# Patient Record
Sex: FEMALE | Race: WHITE | NOT HISPANIC OR LATINO | Employment: OTHER | URBAN - METROPOLITAN AREA
[De-identification: names, ages, dates, MRNs, and addresses within clinical notes are randomized per-mention and may not be internally consistent; named-entity substitution may affect disease eponyms.]

---

## 2017-03-06 RX ORDER — ATORVASTATIN CALCIUM 80 MG/1
TABLET, FILM COATED ORAL
Qty: 90 TABLET | Refills: 0 | Status: SHIPPED | OUTPATIENT
Start: 2017-03-06 | End: 2017-06-03 | Stop reason: SDUPTHER

## 2017-04-10 ENCOUNTER — TRANSCRIBE ORDERS (OUTPATIENT)
Dept: ADMINISTRATIVE | Facility: HOSPITAL | Age: 67
End: 2017-04-10

## 2017-04-10 DIAGNOSIS — Z12.31 VISIT FOR SCREENING MAMMOGRAM: Primary | ICD-10-CM

## 2017-04-17 ENCOUNTER — OFFICE VISIT (OUTPATIENT)
Dept: INTERNAL MEDICINE | Facility: CLINIC | Age: 67
End: 2017-04-17

## 2017-04-17 VITALS
WEIGHT: 174 LBS | DIASTOLIC BLOOD PRESSURE: 68 MMHG | BODY MASS INDEX: 27.25 KG/M2 | RESPIRATION RATE: 16 BRPM | SYSTOLIC BLOOD PRESSURE: 100 MMHG | HEART RATE: 76 BPM

## 2017-04-17 DIAGNOSIS — E03.9 ACQUIRED HYPOTHYROIDISM: ICD-10-CM

## 2017-04-17 DIAGNOSIS — R73.9 ELEVATED BLOOD SUGAR: ICD-10-CM

## 2017-04-17 DIAGNOSIS — E78.5 DYSLIPIDEMIA: ICD-10-CM

## 2017-04-17 DIAGNOSIS — I25.10 CORONARY ARTERY DISEASE INVOLVING NATIVE CORONARY ARTERY OF NATIVE HEART WITHOUT ANGINA PECTORIS: Primary | ICD-10-CM

## 2017-04-17 DIAGNOSIS — I10 ESSENTIAL HYPERTENSION: ICD-10-CM

## 2017-04-17 LAB
EXPIRATION DATE: NORMAL
HBA1C MFR BLD: 5.9 %
Lab: NORMAL

## 2017-04-17 PROCEDURE — 83036 HEMOGLOBIN GLYCOSYLATED A1C: CPT | Performed by: INTERNAL MEDICINE

## 2017-04-17 PROCEDURE — 99214 OFFICE O/P EST MOD 30 MIN: CPT | Performed by: INTERNAL MEDICINE

## 2017-04-17 NOTE — PROGRESS NOTES
Subjective   Patricia F Bancroft is a 66 y.o. female.     History of Present Illness   For follow up of  CAD no chest pain no sob no palpitations.  Hyperlipidemia on meds, no muscle aches.  Elevated blood sugar no new sx.  Depression is stable.      The following portions of the patient's history were reviewed and updated as appropriate: allergies, current medications, past medical history and problem list.    Review of Systems   Constitutional: Negative.    Eyes: Negative.    Respiratory: Negative.  Negative for cough, chest tightness, shortness of breath and wheezing.    Cardiovascular: Negative.  Negative for chest pain, palpitations and leg swelling.   Gastrointestinal: Negative.  Negative for abdominal distention and abdominal pain.       Objective   Physical Exam   Constitutional: She appears well-developed and well-nourished.   Neck: Normal range of motion. Neck supple.   Cardiovascular: Normal rate, regular rhythm, normal heart sounds and intact distal pulses.  Exam reveals no gallop and no friction rub.    No murmur heard.  Pulmonary/Chest: Breath sounds normal. No respiratory distress. She has no wheezes. She has no rales. She exhibits no tenderness.   Abdominal: Soft. Bowel sounds are normal. She exhibits no distension. There is no tenderness.   Nursing note and vitals reviewed.      Assessment/Plan   Dahiana was seen today for hypertension.    Diagnoses and all orders for this visit:    Coronary artery disease involving native coronary artery of native heart without angina pectoris    Essential hypertension    Acquired hypothyroidism    Dyslipidemia    All problems are stable.  Same meds.  Recheck 6 months.

## 2017-05-03 DIAGNOSIS — I10 ESSENTIAL HYPERTENSION: ICD-10-CM

## 2017-05-04 RX ORDER — METOPROLOL SUCCINATE 25 MG/1
TABLET, EXTENDED RELEASE ORAL
Qty: 90 TABLET | Refills: 2 | Status: SHIPPED | OUTPATIENT
Start: 2017-05-04 | End: 2018-01-11 | Stop reason: SDUPTHER

## 2017-06-05 RX ORDER — ATORVASTATIN CALCIUM 80 MG/1
TABLET, FILM COATED ORAL
Qty: 90 TABLET | Refills: 0 | Status: SHIPPED | OUTPATIENT
Start: 2017-06-05 | End: 2017-08-25 | Stop reason: SDUPTHER

## 2017-06-13 ENCOUNTER — RESULTS ENCOUNTER (OUTPATIENT)
Dept: INTERNAL MEDICINE | Facility: CLINIC | Age: 67
End: 2017-06-13

## 2017-06-13 ENCOUNTER — OFFICE VISIT (OUTPATIENT)
Dept: INTERNAL MEDICINE | Facility: CLINIC | Age: 67
End: 2017-06-13

## 2017-06-13 ENCOUNTER — TELEPHONE (OUTPATIENT)
Dept: INTERNAL MEDICINE | Facility: CLINIC | Age: 67
End: 2017-06-13

## 2017-06-13 VITALS
TEMPERATURE: 98 F | RESPIRATION RATE: 16 BRPM | SYSTOLIC BLOOD PRESSURE: 128 MMHG | DIASTOLIC BLOOD PRESSURE: 70 MMHG | BODY MASS INDEX: 27.1 KG/M2 | HEART RATE: 76 BPM | WEIGHT: 173 LBS

## 2017-06-13 DIAGNOSIS — Z13.9 SCREENING: Primary | ICD-10-CM

## 2017-06-13 DIAGNOSIS — Z13.9 SCREENING: ICD-10-CM

## 2017-06-13 PROCEDURE — 99213 OFFICE O/P EST LOW 20 MIN: CPT | Performed by: INTERNAL MEDICINE

## 2017-06-13 NOTE — PROGRESS NOTES
Subjective   Patricia F Bancroft is a 66 y.o. female.     History of Present Illness   Has noticed some pinkish blood she thinks has been coming from vagina.  She has had a complete hysterectomy.  This has gone on for one week.  No pain or other sx.  Bowels are about the same.  Went to GYN and saw the midwife who did not see anything on speculum and urine was normal.  She is to see the GYN md next week.  She is concerned because she is still having the blood on her pad.  None in bowl.    The following portions of the patient's history were reviewed and updated as appropriate: allergies, current medications, past medical history and problem list.    Review of Systems   Constitutional: Negative.    Respiratory: Negative.    Cardiovascular: Negative.    Gastrointestinal: Positive for anal bleeding (not sure.). Negative for abdominal distention and abdominal pain.       Objective   Physical Exam   Constitutional: She appears well-developed.   Abdominal: Soft. Bowel sounds are normal. She exhibits no distension and no mass. There is no tenderness. There is no guarding.   Vitals reviewed.      Assessment/Plan   Dahiana was seen today for rectal bleeding.    Diagnoses and all orders for this visit:    Screening  -     Cologuard; Future    She has not had a colonoscopy in a long time but does not want it.  Will set up cologuard.  She wants to wait and see if goes away to see GI for anoscopy.  She will call if continues.

## 2017-06-13 NOTE — TELEPHONE ENCOUNTER
NOTIFIED PT TO CONTACT INSURANCE COMPANY AND CONFIRM THEY WILL COVER. RECEIVED VERBAL UNDERSTANDING AND APPRECIATION.

## 2017-06-13 NOTE — TELEPHONE ENCOUNTER
----- Message from Anita Jones sent at 6/13/2017 10:55 AM EDT -----  Contact: PATIENT  PATIENT WOULD LIKE TO KNOW IF THE COLOGUARD TEST WILL BE COVERED BY MEDICARE. SHE CAN BE REACHED [phone]

## 2017-06-21 ENCOUNTER — TELEPHONE (OUTPATIENT)
Dept: INTERNAL MEDICINE | Facility: CLINIC | Age: 67
End: 2017-06-21

## 2017-06-21 NOTE — TELEPHONE ENCOUNTER
----- Message from Rachelle Crespo sent at 6/21/2017  9:10 AM EDT -----  Contact: SELF  PATIENT SAYS THAT SHE HAS BEEN SWITHCING BETWEEN DR PIPER AND HER GYNECOLOGIST AND SHE WAS WONDERING IF SHE COULD STOP GOING TO SEE HER GYNECOLOGIST AND JUST STAY WITH DR PIPER. THE GYNECOLOGIST WANTS TO REFER THE PATIENT TO A UROLOGIST AND PATIENT WOULD PREFER IF DR PIPER HANDLES THE REFERRALS AND THE DICTATION OF HER NEXT STEPS. A GOOD CALL BACK NUMBER [phone]. THANK YOU.

## 2017-06-22 NOTE — TELEPHONE ENCOUNTER
PT NOTIFIED. SHE IS GOING TO CALL IN COUPLE WITHIN THE WEEK AND SCHEDULE APPT WITH DR. PIPER TO DISCUSS

## 2017-06-28 ENCOUNTER — TELEPHONE (OUTPATIENT)
Dept: INTERNAL MEDICINE | Facility: CLINIC | Age: 67
End: 2017-06-28

## 2017-06-28 DIAGNOSIS — R31.29 MICROHEMATURIA: Primary | ICD-10-CM

## 2017-06-28 NOTE — TELEPHONE ENCOUNTER
Spoke with pt and she had UA done and blood showed in her urine. Currently is not showing any blood but wants to just have this rechecked since her last dose of abx is tomorrow. Please put in order, pt is wanting to come in on Friday.

## 2017-06-28 NOTE — TELEPHONE ENCOUNTER
----- Message from Jess Anderson sent at 6/28/2017  9:54 AM EDT -----  [phone]    PT IS GOING TO TAKE LAST ANTIBIOTIC TOMORROW.  WANTS TO COME IN Friday TO HAVE URINE RECHECKED.  CAN YOU PUT ORDER IN?  PLEASE CALL TO LET KNOW ORDER IS IN

## 2017-06-28 NOTE — TELEPHONE ENCOUNTER
Spoke with pt and informed her of statement below. She states that her Gynecologist was recommend her have this retested to see if there were more blood showing up after taking abx. Pt is really wanting this as well so she can stop stressing about it.

## 2017-06-28 NOTE — TELEPHONE ENCOUNTER
Ok the order is in the computer.    Again, a urine culture result would be good to have from the other location.

## 2017-06-28 NOTE — TELEPHONE ENCOUNTER
Pt informed and verbalized understanding. Advised pt to contact her OBGYN and have them fax over her urine and culture results.

## 2017-06-28 NOTE — TELEPHONE ENCOUNTER
If her symptoms have improved, t here is no reason to have a recheck unless she hospitalized for it.  See that we didn't do the last UA or abx.  Did she have a culture done?  If the UA showed a small amt of blood, it will have likely cleared up with abx treatment.

## 2017-06-30 ENCOUNTER — LAB (OUTPATIENT)
Dept: INTERNAL MEDICINE | Facility: CLINIC | Age: 67
End: 2017-06-30

## 2017-06-30 DIAGNOSIS — R31.29 MICROHEMATURIA: ICD-10-CM

## 2017-06-30 LAB
BILIRUB BLD-MCNC: NEGATIVE MG/DL
CLARITY, POC: CLEAR
COLOR UR: YELLOW
EXPIRATION DATE: NORMAL
GLUCOSE UR STRIP-MCNC: NEGATIVE MG/DL
KETONES UR QL: NEGATIVE
LEUKOCYTE EST, POC: NEGATIVE
Lab: NORMAL
NITRITE UR-MCNC: NEGATIVE MG/ML
PH UR: 7 [PH] (ref 5–8)
PROT UR STRIP-MCNC: NEGATIVE MG/DL
RBC # UR STRIP: NEGATIVE /UL
SP GR UR: 1.01 (ref 1–1.03)
UROBILINOGEN UR QL: NORMAL

## 2017-06-30 PROCEDURE — 81003 URINALYSIS AUTO W/O SCOPE: CPT | Performed by: PHYSICIAN ASSISTANT

## 2017-07-06 ENCOUNTER — HOSPITAL ENCOUNTER (OUTPATIENT)
Dept: MAMMOGRAPHY | Facility: HOSPITAL | Age: 67
Discharge: HOME | End: 2017-07-06
Attending: OBSTETRICS & GYNECOLOGY | Admitting: OBSTETRICS & GYNECOLOGY

## 2017-07-06 DIAGNOSIS — Z12.31 VISIT FOR SCREENING MAMMOGRAM: ICD-10-CM

## 2017-07-06 PROCEDURE — G0202 SCR MAMMO BI INCL CAD: HCPCS

## 2017-07-06 PROCEDURE — 77063 BREAST TOMOSYNTHESIS BI: CPT

## 2017-07-06 PROCEDURE — G0202 SCR MAMMO BI INCL CAD: HCPCS | Performed by: RADIOLOGY

## 2017-07-06 PROCEDURE — 77063 BREAST TOMOSYNTHESIS BI: CPT | Performed by: RADIOLOGY

## 2017-07-10 ENCOUNTER — OFFICE VISIT (OUTPATIENT)
Dept: CARDIOLOGY | Facility: CLINIC | Age: 67
End: 2017-07-10

## 2017-07-10 VITALS
HEIGHT: 67 IN | HEART RATE: 57 BPM | DIASTOLIC BLOOD PRESSURE: 66 MMHG | WEIGHT: 171 LBS | BODY MASS INDEX: 26.84 KG/M2 | SYSTOLIC BLOOD PRESSURE: 114 MMHG

## 2017-07-10 DIAGNOSIS — I10 ESSENTIAL HYPERTENSION: ICD-10-CM

## 2017-07-10 DIAGNOSIS — E78.2 MIXED HYPERLIPIDEMIA: ICD-10-CM

## 2017-07-10 DIAGNOSIS — I25.10 CORONARY ARTERY DISEASE INVOLVING NATIVE CORONARY ARTERY OF NATIVE HEART WITHOUT ANGINA PECTORIS: Primary | ICD-10-CM

## 2017-07-10 PROCEDURE — 99214 OFFICE O/P EST MOD 30 MIN: CPT | Performed by: PHYSICIAN ASSISTANT

## 2017-07-10 RX ORDER — CLOPIDOGREL BISULFATE 75 MG/1
75 TABLET ORAL DAILY
Qty: 90 TABLET | Refills: 3 | Status: SHIPPED | OUTPATIENT
Start: 2017-07-10 | End: 2018-01-11 | Stop reason: SDUPTHER

## 2017-07-10 NOTE — PROGRESS NOTES
[facility] - Office Note  Patricia F Bancroft         [address] [place] [address]  1950   [phone] (home)      LOCATION:  [place] office.  Visit Type: Follow Up.    PCP:  Nishant Dejesus MD    07/10/17   Patricia F Bancroft is a 66 y.o.  female  retired.      Chief Complaint: Follow up on CAD, HTN, HLP.  PROBLEM LIST:  1. Coronary artery disease:  a. Status post drug-eluting stent to right coronary artery, May 2014, with no significant residual left system disease (EF 65%).  2. Essential Hypertension.  3. Dyslipidemia.  4. Seasonal allergies/allergic rhinitis.  5. History of remote tobacco use.  6. Hyperglycemia.  7. Surgical history:  a. Benign breast biopsy x2.  b. Hysterectomy with ovary removal.          Allergies   Allergen Reactions   • Ibuprofen      Unknown reaction   • Ciprofloxacin Rash         Current Outpatient Prescriptions:   •  aspirin 162 MG EC tablet, Take 162 mg by mouth daily., Disp: , Rfl:   •  atorvastatin (LIPITOR) 80 MG tablet, TAKE ONE TABLET BY MOUTH EVERY NIGHT AT BEDTIME, Disp: 90 tablet, Rfl: 0  •  Cetirizine HCl (ZYRTEC ALLERGY) 10 MG capsule, Take  by mouth., Disp: , Rfl:   •  clopidogrel (PLAVIX) 75 MG tablet, Take 1 tablet by mouth Daily., Disp: 90 tablet, Rfl: 3  •  estradiol (CLIMARA) 0.025 MG/24HR patch, Place 0.025 patches on the skin 1 (one) time per week., Disp: , Rfl:   •  famotidine (PEPCID) 20 MG tablet, Take 20 mg by mouth At Night As Needed for heartburn., Disp: , Rfl:   •  lisinopril (PRINIVIL,ZESTRIL) 10 MG tablet, TAKE ONE TABLET BY MOUTH DAILY, Disp: 90 tablet, Rfl: 2  •  metoprolol succinate XL (TOPROL-XL) 25 MG 24 hr tablet, TAKE ONE TABLET BY MOUTH DAILY, Disp: 90 tablet, Rfl: 2  •  metroNIDAZOLE (METROGEL) 1 % gel, Apply  topically as needed., Disp: , Rfl:     HPI  Dahiana is here today for routine follow-up on CAD with prior stenting, controlled hypertension, and dyslipidemia.  She's also had a history  "of PVCs, treated with beta blockade therapy.  She is done well from a cardiac standpoint since her last visit with us.  She exercises 3-4 times a week without symptoms.  She tolerates her medications and his compliance.  She does need refills today on her Plavix.  She has no cardiac complaints, has had no ED visits or early office visits for heart related issues.    The following portions of the patient's history were reviewed in the chart and updated as appropriate: allergies, current medications, past family history, past medical history, past social history, past surgical history and problem list.    Review of Systems   Constitution: Negative for weakness and malaise/fatigue.   Cardiovascular: Positive for irregular heartbeat. Negative for chest pain, dyspnea on exertion, leg swelling and palpitations.   Hematologic/Lymphatic: Does not bruise/bleed easily.   Musculoskeletal: Negative for myalgias.             height is 67\" (170.2 cm) and weight is 171 lb (77.6 kg). Her blood pressure is 114/66 and her pulse is 57.   Physical Exam   Constitutional: Vital signs are normal. She appears well-developed and well-nourished.   Cardiovascular: Normal rate, regular rhythm, S1 normal, S2 normal, normal heart sounds, intact distal pulses and normal pulses.    Pulmonary/Chest: Effort normal and breath sounds normal. She has no wheezes. She has no rhonchi. She has no rales.   Abdominal: Soft. Normal appearance and bowel sounds are normal. There is no hepatosplenomegaly.   Neurological: She is alert.         Procedures     Assessment/ Plan   Coronary artery disease involving native coronary artery of native heart without angina pectoris:  Stable without symptoms.  Continue all current medications, provide refills on Plavix.  Return to clinic 6 months with EKG or sooner when necessary.    Essential hypertension:  Well-controlled.  Continue current medications.  Get routine blood work to monitor renal function.    Mixed " hyperlipidemia:  Continue Lipitor, exercise, appropriate diet.  Get annual lipid panel and CMP with PCP.      Teresa Kern PA-C  7/10/2017 10:29 AM      EMR Dragon/Transcription disclaimer:   Much of this encounter note is an electronic transcription/translation of spoken language to printed text. The electronic translation of spoken language may permit erroneous, or at times, nonsensical words or phrases to be inadvertently transcribed; Although I have reviewed the note for such errors, some may still exist.

## 2017-07-24 RX ORDER — LISINOPRIL 10 MG/1
TABLET ORAL
Qty: 90 TABLET | Refills: 1 | Status: SHIPPED | OUTPATIENT
Start: 2017-07-24 | End: 2018-01-15 | Stop reason: SDUPTHER

## 2017-08-02 ENCOUNTER — TELEPHONE (OUTPATIENT)
Dept: INTERNAL MEDICINE | Facility: CLINIC | Age: 67
End: 2017-08-02

## 2017-08-02 NOTE — TELEPHONE ENCOUNTER
----- Message from Viktoriya Mercado sent at 8/2/2017 11:04 AM EDT -----  Pt wants to know if results from [facility] have received? Ok to leave a msg.     Patient: [phone]

## 2017-08-07 NOTE — TELEPHONE ENCOUNTER
ZAIDA FROM [facility] STATES HER TEST IS NEGATIVE. SHE WILL FAX RESULTS, YOU WILL GET THE RESULTS SENT TO YOUR INBOX

## 2017-08-25 RX ORDER — ATORVASTATIN CALCIUM 80 MG/1
TABLET, FILM COATED ORAL
Qty: 90 TABLET | Refills: 0 | Status: SHIPPED | OUTPATIENT
Start: 2017-08-25 | End: 2017-11-17 | Stop reason: SDUPTHER

## 2017-10-03 ENCOUNTER — FLU SHOT (OUTPATIENT)
Dept: INTERNAL MEDICINE | Facility: CLINIC | Age: 67
End: 2017-10-03

## 2017-10-03 PROCEDURE — G0008 ADMIN INFLUENZA VIRUS VAC: HCPCS | Performed by: INTERNAL MEDICINE

## 2017-10-03 PROCEDURE — 90662 IIV NO PRSV INCREASED AG IM: CPT | Performed by: INTERNAL MEDICINE

## 2017-10-20 ENCOUNTER — OFFICE VISIT (OUTPATIENT)
Dept: INTERNAL MEDICINE | Facility: CLINIC | Age: 67
End: 2017-10-20

## 2017-10-20 VITALS
SYSTOLIC BLOOD PRESSURE: 126 MMHG | RESPIRATION RATE: 21 BRPM | HEART RATE: 70 BPM | BODY MASS INDEX: 27.1 KG/M2 | WEIGHT: 173 LBS | DIASTOLIC BLOOD PRESSURE: 68 MMHG

## 2017-10-20 DIAGNOSIS — I25.10 CORONARY ARTERY DISEASE INVOLVING NATIVE CORONARY ARTERY OF NATIVE HEART WITHOUT ANGINA PECTORIS: Primary | ICD-10-CM

## 2017-10-20 DIAGNOSIS — E78.2 MIXED HYPERLIPIDEMIA: ICD-10-CM

## 2017-10-20 DIAGNOSIS — E03.9 ACQUIRED HYPOTHYROIDISM: ICD-10-CM

## 2017-10-20 DIAGNOSIS — I10 ESSENTIAL HYPERTENSION: ICD-10-CM

## 2017-10-20 DIAGNOSIS — E78.5 DYSLIPIDEMIA: ICD-10-CM

## 2017-10-20 DIAGNOSIS — R73.9 ELEVATED BLOOD SUGAR: ICD-10-CM

## 2017-10-20 LAB
ALBUMIN SERPL-MCNC: 4.3 G/DL (ref 3.2–4.8)
ALBUMIN/GLOB SERPL: 1.5 G/DL (ref 1.5–2.5)
ALP SERPL-CCNC: 61 U/L (ref 25–100)
ALT SERPL W P-5'-P-CCNC: 31 U/L (ref 7–40)
ANION GAP SERPL CALCULATED.3IONS-SCNC: 5 MMOL/L (ref 3–11)
ARTICHOKE IGE QN: 105 MG/DL (ref 0–130)
AST SERPL-CCNC: 22 U/L (ref 0–33)
BILIRUB SERPL-MCNC: 0.5 MG/DL (ref 0.3–1.2)
BUN BLD-MCNC: 17 MG/DL (ref 9–23)
BUN/CREAT SERPL: 24.3 (ref 7–25)
CALCIUM SPEC-SCNC: 9.3 MG/DL (ref 8.7–10.4)
CHLORIDE SERPL-SCNC: 105 MMOL/L (ref 99–109)
CHOLEST SERPL-MCNC: 174 MG/DL (ref 0–200)
CO2 SERPL-SCNC: 29 MMOL/L (ref 20–31)
CREAT BLD-MCNC: 0.7 MG/DL (ref 0.6–1.3)
DEPRECATED RDW RBC AUTO: 48.5 FL (ref 37–54)
ERYTHROCYTE [DISTWIDTH] IN BLOOD BY AUTOMATED COUNT: 13.6 % (ref 11.3–14.5)
EXPIRATION DATE: NORMAL
GFR SERPL CREATININE-BSD FRML MDRD: 83 ML/MIN/1.73
GLOBULIN UR ELPH-MCNC: 2.8 GM/DL
GLUCOSE BLD-MCNC: 95 MG/DL (ref 70–100)
HBA1C MFR BLD: 5.9 %
HCT VFR BLD AUTO: 42.5 % (ref 34.5–44)
HDLC SERPL-MCNC: 54 MG/DL (ref 40–60)
HGB BLD-MCNC: 13.8 G/DL (ref 11.5–15.5)
Lab: NORMAL
MCH RBC QN AUTO: 31.4 PG (ref 27–31)
MCHC RBC AUTO-ENTMCNC: 32.5 G/DL (ref 32–36)
MCV RBC AUTO: 96.8 FL (ref 80–99)
PLATELET # BLD AUTO: 330 10*3/MM3 (ref 150–450)
PMV BLD AUTO: 10.9 FL (ref 6–12)
POTASSIUM BLD-SCNC: 4.4 MMOL/L (ref 3.5–5.5)
PROT SERPL-MCNC: 7.1 G/DL (ref 5.7–8.2)
RBC # BLD AUTO: 4.39 10*6/MM3 (ref 3.89–5.14)
SODIUM BLD-SCNC: 139 MMOL/L (ref 132–146)
TRIGL SERPL-MCNC: 113 MG/DL (ref 0–150)
WBC NRBC COR # BLD: 6.71 10*3/MM3 (ref 3.5–10.8)

## 2017-10-20 PROCEDURE — 80061 LIPID PANEL: CPT | Performed by: INTERNAL MEDICINE

## 2017-10-20 PROCEDURE — 80053 COMPREHEN METABOLIC PANEL: CPT | Performed by: INTERNAL MEDICINE

## 2017-10-20 PROCEDURE — 36415 COLL VENOUS BLD VENIPUNCTURE: CPT | Performed by: INTERNAL MEDICINE

## 2017-10-20 PROCEDURE — 85027 COMPLETE CBC AUTOMATED: CPT | Performed by: INTERNAL MEDICINE

## 2017-10-20 PROCEDURE — 83036 HEMOGLOBIN GLYCOSYLATED A1C: CPT | Performed by: INTERNAL MEDICINE

## 2017-10-20 PROCEDURE — 99214 OFFICE O/P EST MOD 30 MIN: CPT | Performed by: INTERNAL MEDICINE

## 2017-10-20 NOTE — PROGRESS NOTES
Subjective Patricia F Bancroft is a 67 y.o. female.     History of Present Illness   For follow up of  CAD no chest pain, sob or palpitations.  Hyperlipidemia on meds, no muscle aches.  HTN on meds, no headaches.  Depression is doing well.  Elevated blood sugar no new sx.    The following portions of the patient's history were reviewed and updated as appropriate: allergies, current medications, past family history, past medical history, past social history, past surgical history and problem list.    Review of Systems   Constitutional: Negative.  Negative for appetite change, fatigue and fever.   HENT: Negative.  Negative for congestion, ear pain, hearing loss, rhinorrhea, sinus pressure, sore throat, tinnitus and trouble swallowing.    Eyes: Negative.  Negative for redness and visual disturbance.   Respiratory: Negative.  Negative for cough, chest tightness, shortness of breath and wheezing.    Cardiovascular: Negative.  Negative for chest pain, palpitations and leg swelling.   Gastrointestinal: Negative.  Negative for abdominal distention, abdominal pain, blood in stool, constipation, diarrhea, nausea and vomiting.   Endocrine: Negative.  Negative for polydipsia, polyphagia and polyuria.   Genitourinary: Negative.  Negative for difficulty urinating, dysuria, flank pain, frequency, menstrual problem, pelvic pain, vaginal bleeding and vaginal discharge.   Musculoskeletal: Negative.  Negative for arthralgias, back pain, gait problem, joint swelling and neck pain.   Skin: Negative for rash.   Allergic/Immunologic: Negative.  Negative for environmental allergies.   Neurological: Negative.  Negative for dizziness, tremors, seizures, weakness, numbness and headaches.   Hematological: Negative.  Negative for adenopathy.   Psychiatric/Behavioral: Negative.  Negative for agitation, confusion, dysphoric mood and sleep disturbance. The patient is not nervous/anxious.        Objective   Physical Exam   Constitutional: She is  oriented to person, place, and time. She appears well-developed and well-nourished.   HENT:   Head: Normocephalic and atraumatic.   Right Ear: External ear normal.   Left Ear: External ear normal.   Nose: Nose normal.   Mouth/Throat: Oropharynx is clear and moist.   Eyes: Conjunctivae and EOM are normal. Pupils are equal, round, and reactive to light.   Fundoscopic exam:       The right eye shows no AV nicking and no hemorrhage.        The left eye shows no AV nicking and no hemorrhage.   Neck: Normal range of motion. Neck supple. No thyromegaly present.   Cardiovascular: Normal rate, regular rhythm, normal heart sounds and intact distal pulses.  Exam reveals no gallop and no friction rub.    No murmur heard.  Carotids normal.   Pulmonary/Chest: Effort normal and breath sounds normal. No respiratory distress. She has no wheezes. She has no rales. She exhibits no tenderness. Right breast exhibits no inverted nipple, no mass, no skin change and no tenderness. Left breast exhibits no inverted nipple, no mass, no skin change and no tenderness. Breasts are symmetrical.   Abdominal: Soft. Bowel sounds are normal. She exhibits no distension and no mass. There is no tenderness.   Musculoskeletal: Normal range of motion.   Lymphadenopathy:     She has no cervical adenopathy.   Neurological: She is alert and oriented to person, place, and time. She has normal reflexes. No cranial nerve deficit.   Skin: Skin is warm and dry.   Psychiatric: She has a normal mood and affect. Her behavior is normal. Judgment and thought content normal.   Nursing note and vitals reviewed.      Assessment/Plan   Dahiana was seen today for hyperlipidemia.    Diagnoses and all orders for this visit:    Coronary artery disease involving native coronary artery of native heart without angina pectoris    Essential hypertension    Mixed hyperlipidemia  -     Comprehensive Metabolic Panel  -     Lipid Panel  -     CBC (No Diff)    Acquired  hypothyroidism    Dyslipidemia    Elevated blood sugar  -     POC Glycosylated Hemoglobin (Hb A1C)    All problems are stable.  Labs as noted.  Same meds.  Recheck 6 months.

## 2017-11-17 RX ORDER — ATORVASTATIN CALCIUM 80 MG/1
TABLET, FILM COATED ORAL
Qty: 90 TABLET | Refills: 0 | Status: SHIPPED | OUTPATIENT
Start: 2017-11-17 | End: 2018-01-15 | Stop reason: SDUPTHER

## 2018-01-11 DIAGNOSIS — I10 ESSENTIAL HYPERTENSION: ICD-10-CM

## 2018-01-11 RX ORDER — METOPROLOL SUCCINATE 25 MG/1
25 TABLET, EXTENDED RELEASE ORAL DAILY
Qty: 90 TABLET | Refills: 1 | Status: SHIPPED | OUTPATIENT
Start: 2018-01-11 | End: 2018-01-15 | Stop reason: SDUPTHER

## 2018-01-11 RX ORDER — CLOPIDOGREL BISULFATE 75 MG/1
75 TABLET ORAL DAILY
Qty: 90 TABLET | Refills: 1 | Status: SHIPPED | OUTPATIENT
Start: 2018-01-11 | End: 2018-01-15 | Stop reason: SDUPTHER

## 2018-01-15 ENCOUNTER — TELEPHONE (OUTPATIENT)
Dept: INTERNAL MEDICINE | Facility: CLINIC | Age: 68
End: 2018-01-15

## 2018-01-15 DIAGNOSIS — I10 ESSENTIAL HYPERTENSION: ICD-10-CM

## 2018-01-15 RX ORDER — LISINOPRIL 10 MG/1
10 TABLET ORAL DAILY
Qty: 90 TABLET | Refills: 2 | Status: SHIPPED | OUTPATIENT
Start: 2018-01-15 | End: 2018-08-29 | Stop reason: SDUPTHER

## 2018-01-15 RX ORDER — CLOPIDOGREL BISULFATE 75 MG/1
75 TABLET ORAL DAILY
Qty: 90 TABLET | Refills: 1 | Status: SHIPPED | OUTPATIENT
Start: 2018-01-15 | End: 2018-01-29

## 2018-01-15 RX ORDER — METOPROLOL SUCCINATE 25 MG/1
25 TABLET, EXTENDED RELEASE ORAL DAILY
Qty: 90 TABLET | Refills: 2 | Status: SHIPPED | OUTPATIENT
Start: 2018-01-15 | End: 2019-04-05 | Stop reason: SDUPTHER

## 2018-01-15 RX ORDER — ATORVASTATIN CALCIUM 80 MG/1
80 TABLET, FILM COATED ORAL NIGHTLY
Qty: 90 TABLET | Refills: 2 | Status: SHIPPED | OUTPATIENT
Start: 2018-01-15 | End: 2018-09-11 | Stop reason: SDUPTHER

## 2018-01-15 NOTE — TELEPHONE ENCOUNTER
----- Message from Jess Arevalo sent at 1/15/2018  9:33 AM EST -----  [phone]--DANA ESTEVEZ    THEY HAVE CHANGED PHARMACY TO [facility]. ALL RXS NEED TO BE SENT TO THEM NOW PLEASE.  [facility] PHONE [phone]

## 2018-01-29 ENCOUNTER — OFFICE VISIT (OUTPATIENT)
Dept: CARDIOLOGY | Facility: CLINIC | Age: 68
End: 2018-01-29

## 2018-01-29 VITALS
WEIGHT: 177 LBS | SYSTOLIC BLOOD PRESSURE: 114 MMHG | BODY MASS INDEX: 27.78 KG/M2 | DIASTOLIC BLOOD PRESSURE: 64 MMHG | HEART RATE: 74 BPM | HEIGHT: 67 IN

## 2018-01-29 DIAGNOSIS — I10 ESSENTIAL HYPERTENSION: ICD-10-CM

## 2018-01-29 DIAGNOSIS — E78.2 MIXED HYPERLIPIDEMIA: ICD-10-CM

## 2018-01-29 DIAGNOSIS — I25.10 CORONARY ARTERY DISEASE INVOLVING NATIVE CORONARY ARTERY OF NATIVE HEART WITHOUT ANGINA PECTORIS: Primary | ICD-10-CM

## 2018-01-29 PROCEDURE — 99214 OFFICE O/P EST MOD 30 MIN: CPT | Performed by: INTERNAL MEDICINE

## 2018-01-29 RX ORDER — RANITIDINE 150 MG/1
150 TABLET ORAL DAILY
COMMUNITY
End: 2019-11-22

## 2018-04-20 ENCOUNTER — OFFICE VISIT (OUTPATIENT)
Dept: INTERNAL MEDICINE | Facility: CLINIC | Age: 68
End: 2018-04-20

## 2018-04-20 VITALS
SYSTOLIC BLOOD PRESSURE: 134 MMHG | WEIGHT: 177 LBS | RESPIRATION RATE: 21 BRPM | HEART RATE: 80 BPM | BODY MASS INDEX: 27.72 KG/M2 | DIASTOLIC BLOOD PRESSURE: 72 MMHG

## 2018-04-20 DIAGNOSIS — R73.03 PRE-DIABETES: ICD-10-CM

## 2018-04-20 DIAGNOSIS — E78.2 MIXED HYPERLIPIDEMIA: ICD-10-CM

## 2018-04-20 DIAGNOSIS — E78.5 DYSLIPIDEMIA: ICD-10-CM

## 2018-04-20 DIAGNOSIS — R73.9 ELEVATED BLOOD SUGAR: Primary | ICD-10-CM

## 2018-04-20 DIAGNOSIS — I25.10 CORONARY ARTERY DISEASE INVOLVING NATIVE CORONARY ARTERY OF NATIVE HEART WITHOUT ANGINA PECTORIS: ICD-10-CM

## 2018-04-20 DIAGNOSIS — I10 ESSENTIAL HYPERTENSION: ICD-10-CM

## 2018-04-20 LAB
EXPIRATION DATE: NORMAL
HBA1C MFR BLD: 6 %
Lab: NORMAL

## 2018-04-20 PROCEDURE — 83036 HEMOGLOBIN GLYCOSYLATED A1C: CPT | Performed by: INTERNAL MEDICINE

## 2018-04-20 PROCEDURE — 99214 OFFICE O/P EST MOD 30 MIN: CPT | Performed by: INTERNAL MEDICINE

## 2018-04-20 RX ORDER — CLOPIDOGREL BISULFATE 75 MG/1
TABLET ORAL
COMMUNITY
Start: 2018-04-02 | End: 2018-11-05

## 2018-04-20 NOTE — PROGRESS NOTES
Subjective   Patricia F Bancroft is a 67 y.o. female.     History of Present Illness   For follow up of  CAD no chest pain no sob.  HTN no headaches.  Hyperlipidemia on meds, no muscle aches.  Pre diabetes, no sx.    The following portions of the patient's history were reviewed and updated as appropriate: allergies, current medications, past medical history and problem list.    Review of Systems   Constitutional: Negative.    Eyes: Negative.    Respiratory: Negative.  Negative for cough, chest tightness, shortness of breath and wheezing.    Cardiovascular: Negative.  Negative for chest pain, palpitations and leg swelling.   Gastrointestinal: Negative.  Negative for abdominal distention and abdominal pain.       Objective   Physical Exam   Constitutional: She appears well-developed and well-nourished.   Neck: Normal range of motion. Neck supple.   Cardiovascular: Normal rate, regular rhythm and normal heart sounds.  Exam reveals no gallop and no friction rub.    No murmur heard.  Pulmonary/Chest: Effort normal and breath sounds normal. No respiratory distress. She has no wheezes. She has no rales. She exhibits no tenderness.   Abdominal: Soft. Bowel sounds are normal. She exhibits no distension. There is no tenderness. There is no guarding.   Nursing note and vitals reviewed.        Assessment/Plan   Dahiana was seen today for essential hypertension.    Diagnoses and all orders for this visit:    Elevated blood sugar  -     POC Glycosylated Hemoglobin (Hb A1C)    Coronary artery disease involving native coronary artery of native heart without angina pectoris    Essential hypertension    Mixed hyperlipidemia    Pre-diabetes    Dyslipidemia    A1C = 6.0.  All problems are stable.  Same meds.  Recheck 6 months.

## 2018-05-22 ENCOUNTER — TRANSCRIBE ORDERS (OUTPATIENT)
Dept: ADMINISTRATIVE | Facility: HOSPITAL | Age: 68
End: 2018-05-22

## 2018-05-22 DIAGNOSIS — Z12.31 VISIT FOR SCREENING MAMMOGRAM: Primary | ICD-10-CM

## 2018-06-14 DIAGNOSIS — I10 ESSENTIAL HYPERTENSION: ICD-10-CM

## 2018-06-15 RX ORDER — METOPROLOL SUCCINATE 25 MG/1
TABLET, EXTENDED RELEASE ORAL
Qty: 90 TABLET | Refills: 1 | Status: SHIPPED | OUTPATIENT
Start: 2018-06-15 | End: 2018-08-01

## 2018-07-25 ENCOUNTER — HOSPITAL ENCOUNTER (OUTPATIENT)
Dept: MAMMOGRAPHY | Facility: HOSPITAL | Age: 68
Discharge: HOME | End: 2018-07-25
Attending: INTERNAL MEDICINE | Admitting: INTERNAL MEDICINE

## 2018-07-25 DIAGNOSIS — Z12.31 VISIT FOR SCREENING MAMMOGRAM: ICD-10-CM

## 2018-07-25 PROCEDURE — 77067 SCR MAMMO BI INCL CAD: CPT

## 2018-07-25 PROCEDURE — 77067 SCR MAMMO BI INCL CAD: CPT | Performed by: RADIOLOGY

## 2018-07-25 PROCEDURE — 77063 BREAST TOMOSYNTHESIS BI: CPT | Performed by: RADIOLOGY

## 2018-07-25 PROCEDURE — 77063 BREAST TOMOSYNTHESIS BI: CPT

## 2018-08-01 ENCOUNTER — OFFICE VISIT (OUTPATIENT)
Dept: CARDIOLOGY | Facility: CLINIC | Age: 68
End: 2018-08-01

## 2018-08-01 VITALS
HEART RATE: 80 BPM | DIASTOLIC BLOOD PRESSURE: 50 MMHG | HEIGHT: 68 IN | WEIGHT: 179 LBS | SYSTOLIC BLOOD PRESSURE: 116 MMHG | BODY MASS INDEX: 27.13 KG/M2

## 2018-08-01 DIAGNOSIS — I10 ESSENTIAL HYPERTENSION: ICD-10-CM

## 2018-08-01 DIAGNOSIS — E78.2 MIXED HYPERLIPIDEMIA: ICD-10-CM

## 2018-08-01 DIAGNOSIS — I25.10 CORONARY ARTERY DISEASE INVOLVING NATIVE CORONARY ARTERY OF NATIVE HEART WITHOUT ANGINA PECTORIS: Primary | ICD-10-CM

## 2018-08-01 PROCEDURE — 99214 OFFICE O/P EST MOD 30 MIN: CPT | Performed by: INTERNAL MEDICINE

## 2018-08-01 NOTE — PROGRESS NOTES
[facility]  Office visit  Patricia F Bancroft  1950  [phone]    VISIT DATE:  01/29/2018    PCP: Nishant Dejesus MD  [address] [place], [place] [id number]    CC:  No chief complaint on file.      PROBLEM LIST:  1. Coronary artery disease:  a. Status post drug-eluting stent to right coronary artery, May 2014, with no significant residual left system disease (EF 65%).  2. Essential Hypertension.  3. Dyslipidemia.  4. Seasonal allergies/allergic rhinitis.  5. History of remote tobacco use.  6. Hyperglycemia.  7. Surgical history:  a. Benign breast biopsy x2.  b. Hysterectomy with ovary removal.      ASSESSMENT:   Diagnosis Plan   1. Coronary artery disease involving native coronary artery of native heart without angina pectoris     2. Essential hypertension     3. Mixed hyperlipidemia         PLAN:  Coronary disease: Stable asymptomatic.  Continue aspirin 81 mg by mouth daily and Plavix 75 mg by mouth daily.  She was very hesitant to discontinue dual antiplatelet therapy as we previously discussed.  I reviewed her previous intervention, she is been stable for over 4 years after percutaneous intervention, no high risk anatomical or clinical features which would warrant chronic dual antiplatelet therapy.  Discussed that I felt the risk benefit ratio favored stopping either her aspirin or Plavix.  She will have further discussions with her primary care physician.    Hypertension: Goal less than 130/80.  Currently well-controlled.  Continue current medical therapy.    Hyperlipidemia: Goal LDL <100, recently trended up slightly.  Repeat pending at next primary care physician visit, would switch to rosuvastatin 40 mg Daily if LDL still greater than 100.      Subjective  Here for routine follow-up.  Denies dyspnea on exertion or palpitations.  Compliant with medical therapy.  Blood pressures running less than 130/80 mmHg.  Denies myalgias on statin therapy.  Denies chest  pain.  Discussed pathophysiology of coronary atherosclerosis today to include indications for all of her cardiac medications with the description of her previous percutaneous intervention and the re-endothelialization of the stent scaffold that occurs within the first 6-12 months of stenting.    PHYSICAL EXAMINATION:  There were no vitals filed for this visit.  General Appearance:    Alert, cooperative, no distress, appears stated age   Head:    Normocephalic, without obvious abnormality, atraumatic   Eyes:    conjunctiva/corneas clear   Nose:   Nares normal, septum midline, mucosa normal, no drainage   Throat:   Lips, teeth and gums normal   Neck:   Supple, symmetrical, trachea midline, no carotid    bruit or JVD   Lungs:     Clear to auscultation bilaterally, respirations unlabored   Chest Wall:    No tenderness or deformity    Heart:    Regular rate and rhythm, S1 and S2 normal, no murmur, rub   or gallop, normal carotid impulse bilaterally without bruit.   Abdomen:     Soft, non-tender   Extremities:   Extremities normal, atraumatic, no cyanosis or edema   Pulses:   2+ and symmetric all extremities   Skin:   Skin color, texture, turgor normal, no rashes or lesions       Diagnostic Data:  Procedures  Lab Results   Component Value Date    CHLPL 172 04/27/2016    TRIG 113 10/20/2017    HDL 54 10/20/2017     Lab Results   Component Value Date    GLUCOSE 95 10/20/2017    BUN 17 10/20/2017    CREATININE 0.70 10/20/2017     10/20/2017    K 4.4 10/20/2017     10/20/2017    CO2 29.0 10/20/2017     Lab Results   Component Value Date    HGBA1C 6.0 04/20/2018     Lab Results   Component Value Date    WBC 6.71 10/20/2017    HGB 13.8 10/20/2017    HCT 42.5 10/20/2017     10/20/2017       Allergies  Allergies   Allergen Reactions   • Ciprofloxacin Rash   • Ibuprofen Rash       Current Medications    Current Outpatient Prescriptions:   •  aspirin 162 MG EC tablet, Take 162 mg by mouth daily., Disp: , Rfl:   •   atorvastatin (LIPITOR) 80 MG tablet, Take 1 tablet by mouth Every Night., Disp: 90 tablet, Rfl: 2  •  Cetirizine HCl (ZYRTEC ALLERGY) 10 MG capsule, Take  by mouth Daily., Disp: , Rfl:   •  clopidogrel (PLAVIX) 75 MG tablet, , Disp: , Rfl:   •  estradiol (CLIMARA) 0.025 MG/24HR patch, Place 1 patch on the skin 1 (One) Time Per Week., Disp: 12 patch, Rfl: 2  •  lisinopril (PRINIVIL,ZESTRIL) 10 MG tablet, Take 1 tablet by mouth Daily., Disp: 90 tablet, Rfl: 2  •  metoprolol succinate XL (TOPROL-XL) 25 MG 24 hr tablet, Take 1 tablet by mouth Daily., Disp: 90 tablet, Rfl: 2  •  metoprolol succinate XL (TOPROL-XL) 25 MG 24 hr tablet, TAKE 1 TABLET DAILY, Disp: 90 tablet, Rfl: 1  •  metroNIDAZOLE (METROGEL) 1 % gel, Apply  topically as needed., Disp: , Rfl:   •  raNITIdine (ZANTAC) 150 MG tablet, Take 150 mg by mouth Daily., Disp: , Rfl:           ROS  Review of Systems   Cardiovascular: Negative for chest pain, dyspnea on exertion and irregular heartbeat.   Respiratory: Negative for cough.          SOCIAL HX  Social History     Social History   • Marital status:      Spouse name: N/A   • Number of children: N/A   • Years of education: N/A     Occupational History   • Not on file.     Social History Main Topics   • Smoking status: Former Smoker     Years: 40.00     Types: Cigarettes     Quit date: 2009   • Smokeless tobacco: Never Used   • Alcohol use No   • Drug use: No   • Sexual activity: Defer     Other Topics Concern   • Not on file     Social History Narrative   • No narrative on file       FAMILY HX  Family History   Problem Relation Age of Onset   • Cancer Mother    • Cancer Father    • Breast cancer Neg Hx    • Ovarian cancer Neg Hx              Ghanshyam Padron III, MD, FACC

## 2018-08-30 RX ORDER — LISINOPRIL 10 MG/1
TABLET ORAL
Qty: 90 TABLET | Refills: 2 | Status: SHIPPED | OUTPATIENT
Start: 2018-08-30 | End: 2020-03-05

## 2018-09-11 ENCOUNTER — TELEPHONE (OUTPATIENT)
Dept: INTERNAL MEDICINE | Facility: CLINIC | Age: 68
End: 2018-09-11

## 2018-09-11 RX ORDER — ATORVASTATIN CALCIUM 80 MG/1
80 TABLET, FILM COATED ORAL NIGHTLY
Qty: 90 TABLET | Refills: 2 | Status: SHIPPED | OUTPATIENT
Start: 2018-09-11 | End: 2019-05-20 | Stop reason: ALTCHOICE

## 2018-09-11 NOTE — TELEPHONE ENCOUNTER
----- Message from Dia Mcmanus sent at 9/10/2018  5:16 PM EDT -----  VIJI FROM [facility] CALLING FOR PATRICIA BANCROFT WHO NEEDS A REFILL FOR ATORVASTATIN 80 MG.  [facility] PH:[phone]  FAX:[phone]  REF #: [id number]

## 2018-10-01 ENCOUNTER — FLU SHOT (OUTPATIENT)
Dept: INTERNAL MEDICINE | Facility: CLINIC | Age: 68
End: 2018-10-01

## 2018-10-01 PROCEDURE — G0008 ADMIN INFLUENZA VIRUS VAC: HCPCS | Performed by: INTERNAL MEDICINE

## 2018-10-01 PROCEDURE — 90662 IIV NO PRSV INCREASED AG IM: CPT | Performed by: INTERNAL MEDICINE

## 2018-11-05 ENCOUNTER — OFFICE VISIT (OUTPATIENT)
Dept: INTERNAL MEDICINE | Facility: CLINIC | Age: 68
End: 2018-11-05

## 2018-11-05 VITALS
BODY MASS INDEX: 26.7 KG/M2 | SYSTOLIC BLOOD PRESSURE: 122 MMHG | HEART RATE: 66 BPM | WEIGHT: 173 LBS | RESPIRATION RATE: 19 BRPM | DIASTOLIC BLOOD PRESSURE: 62 MMHG

## 2018-11-05 DIAGNOSIS — K59.04 CHRONIC IDIOPATHIC CONSTIPATION: ICD-10-CM

## 2018-11-05 DIAGNOSIS — R73.9 ELEVATED BLOOD SUGAR: ICD-10-CM

## 2018-11-05 DIAGNOSIS — I10 ESSENTIAL HYPERTENSION: ICD-10-CM

## 2018-11-05 DIAGNOSIS — I25.10 CORONARY ARTERY DISEASE INVOLVING NATIVE CORONARY ARTERY OF NATIVE HEART WITHOUT ANGINA PECTORIS: Primary | ICD-10-CM

## 2018-11-05 DIAGNOSIS — E78.2 MIXED HYPERLIPIDEMIA: ICD-10-CM

## 2018-11-05 DIAGNOSIS — Z79.899 DRUG THERAPY: ICD-10-CM

## 2018-11-05 LAB
ALBUMIN SERPL-MCNC: 4.34 G/DL (ref 3.2–4.8)
ALBUMIN/GLOB SERPL: 1.9 G/DL (ref 1.5–2.5)
ALP SERPL-CCNC: 60 U/L (ref 25–100)
ALT SERPL W P-5'-P-CCNC: 25 U/L (ref 7–40)
ANION GAP SERPL CALCULATED.3IONS-SCNC: 7 MMOL/L (ref 3–11)
ARTICHOKE IGE QN: 95 MG/DL (ref 0–130)
AST SERPL-CCNC: 20 U/L (ref 0–33)
BILIRUB SERPL-MCNC: 0.6 MG/DL (ref 0.3–1.2)
BUN BLD-MCNC: 15 MG/DL (ref 9–23)
BUN/CREAT SERPL: 19.2 (ref 7–25)
CALCIUM SPEC-SCNC: 9.2 MG/DL (ref 8.7–10.4)
CHLORIDE SERPL-SCNC: 103 MMOL/L (ref 99–109)
CHOLEST SERPL-MCNC: 170 MG/DL (ref 0–200)
CO2 SERPL-SCNC: 27 MMOL/L (ref 20–31)
CREAT BLD-MCNC: 0.78 MG/DL (ref 0.6–1.3)
DEPRECATED RDW RBC AUTO: 45.1 FL (ref 37–54)
ERYTHROCYTE [DISTWIDTH] IN BLOOD BY AUTOMATED COUNT: 13 % (ref 11.3–14.5)
GFR SERPL CREATININE-BSD FRML MDRD: 73 ML/MIN/1.73
GLOBULIN UR ELPH-MCNC: 2.3 GM/DL
GLUCOSE BLD-MCNC: 88 MG/DL (ref 70–100)
HBA1C MFR BLD: 6 % (ref 4.8–5.6)
HCT VFR BLD AUTO: 43.4 % (ref 34.5–44)
HDLC SERPL-MCNC: 51 MG/DL (ref 40–60)
HGB BLD-MCNC: 14.2 G/DL (ref 11.5–15.5)
MCH RBC QN AUTO: 31.5 PG (ref 27–31)
MCHC RBC AUTO-ENTMCNC: 32.7 G/DL (ref 32–36)
MCV RBC AUTO: 96.2 FL (ref 80–99)
PLATELET # BLD AUTO: 352 10*3/MM3 (ref 150–450)
PMV BLD AUTO: 11.3 FL (ref 6–12)
POTASSIUM BLD-SCNC: 4.7 MMOL/L (ref 3.5–5.5)
PROT SERPL-MCNC: 6.6 G/DL (ref 5.7–8.2)
RBC # BLD AUTO: 4.51 10*6/MM3 (ref 3.89–5.14)
SODIUM BLD-SCNC: 137 MMOL/L (ref 132–146)
TRIGL SERPL-MCNC: 144 MG/DL (ref 0–150)
WBC NRBC COR # BLD: 6.72 10*3/MM3 (ref 3.5–10.8)

## 2018-11-05 PROCEDURE — 80061 LIPID PANEL: CPT | Performed by: INTERNAL MEDICINE

## 2018-11-05 PROCEDURE — 83036 HEMOGLOBIN GLYCOSYLATED A1C: CPT | Performed by: INTERNAL MEDICINE

## 2018-11-05 PROCEDURE — 36415 COLL VENOUS BLD VENIPUNCTURE: CPT | Performed by: INTERNAL MEDICINE

## 2018-11-05 PROCEDURE — 85027 COMPLETE CBC AUTOMATED: CPT | Performed by: INTERNAL MEDICINE

## 2018-11-05 PROCEDURE — 99214 OFFICE O/P EST MOD 30 MIN: CPT | Performed by: INTERNAL MEDICINE

## 2018-11-05 PROCEDURE — 80053 COMPREHEN METABOLIC PANEL: CPT | Performed by: INTERNAL MEDICINE

## 2018-11-05 NOTE — PROGRESS NOTES
Subjective Patricia F Bancroft is a 68 y.o. female.     History of Present Illness   For follow up of  HTN on meds, no headaches.  CAD no chest pain, no sob or palpitations.  Hyperlipidemia on meds, no muscle aches.  Borderline blood sugar.  Constipation not doing great.      The following portions of the patient's history were reviewed and updated as appropriate: allergies, current medications, past family history, past medical history, past social history, past surgical history and problem list.   Sister diagnosed with pancreatic cancer.    Review of Systems   Constitutional: Negative.  Negative for appetite change, fatigue and fever.   HENT: Negative.  Negative for congestion, ear pain, hearing loss, rhinorrhea, sinus pressure, sore throat, tinnitus and trouble swallowing.    Eyes: Negative.  Negative for redness and visual disturbance.   Respiratory: Negative.  Negative for cough, chest tightness, shortness of breath and wheezing.    Cardiovascular: Negative.  Negative for chest pain, palpitations and leg swelling.   Gastrointestinal: Positive for constipation. Negative for abdominal distention, abdominal pain, blood in stool, diarrhea, nausea and vomiting.   Endocrine: Negative.  Negative for polydipsia, polyphagia and polyuria.   Genitourinary: Negative.  Negative for difficulty urinating, dysuria, flank pain, frequency, menstrual problem, pelvic pain, vaginal bleeding and vaginal discharge.   Musculoskeletal: Negative.  Negative for arthralgias, back pain, gait problem, joint swelling and neck pain.   Skin: Negative for rash.   Allergic/Immunologic: Negative.  Negative for environmental allergies.   Neurological: Negative.  Negative for dizziness, tremors, seizures, weakness, numbness and confusion.   Hematological: Negative.  Negative for adenopathy.   Psychiatric/Behavioral: Negative.  Negative for agitation, dysphoric mood, sleep disturbance and depressed mood. The patient is not nervous/anxious.         Objective   Physical Exam   Constitutional: She is oriented to person, place, and time. She appears well-developed and well-nourished.   HENT:   Head: Normocephalic and atraumatic.   Right Ear: External ear normal.   Left Ear: External ear normal.   Nose: Nose normal.   Mouth/Throat: Oropharynx is clear and moist.   Eyes: Pupils are equal, round, and reactive to light. Conjunctivae and EOM are normal.   Neck: Normal range of motion. Neck supple. No thyromegaly present.   Cardiovascular: Normal rate, regular rhythm, normal heart sounds and intact distal pulses.  Exam reveals no gallop and no friction rub.    No murmur heard.  Carotids normal.   Pulmonary/Chest: Effort normal and breath sounds normal. No respiratory distress. She has no wheezes. She has no rales. She exhibits no tenderness. Right breast exhibits no mass, no skin change and no tenderness. Left breast exhibits no mass, no skin change and no tenderness.   Abdominal: Soft. Bowel sounds are normal. She exhibits no distension and no mass. There is no tenderness.   Musculoskeletal: Normal range of motion. She exhibits no edema.   Lymphadenopathy:     She has no cervical adenopathy.   Neurological: She is alert and oriented to person, place, and time. She has normal reflexes. No cranial nerve deficit.   Skin: Skin is warm and dry.   Psychiatric: She has a normal mood and affect. Her behavior is normal. Judgment and thought content normal.   Nursing note and vitals reviewed.        Assessment/Plan   Dahiana was seen today for essential hypertension.    Diagnoses and all orders for this visit:    Coronary artery disease involving native coronary artery of native heart without angina pectoris    Essential hypertension    Mixed hyperlipidemia  -     Comprehensive Metabolic Panel  -     Lipid Panel    Elevated blood sugar  -     Hemoglobin A1c    Drug therapy  -     CBC (No Diff)    Chronic idiopathic constipation    All problems are stable.  Will try to  increase fiber or myrilax.  Labs as ordered.  Same meds.  Recheck 6 months.

## 2018-11-10 DIAGNOSIS — I10 ESSENTIAL HYPERTENSION: ICD-10-CM

## 2018-11-13 RX ORDER — METOPROLOL SUCCINATE 25 MG/1
TABLET, EXTENDED RELEASE ORAL
Qty: 90 TABLET | Refills: 1 | Status: SHIPPED | OUTPATIENT
Start: 2018-11-13 | End: 2019-02-28 | Stop reason: SDUPTHER

## 2019-01-21 ENCOUNTER — OFFICE VISIT (OUTPATIENT)
Dept: INTERNAL MEDICINE | Facility: CLINIC | Age: 69
End: 2019-01-21

## 2019-01-21 VITALS
RESPIRATION RATE: 22 BRPM | TEMPERATURE: 98.2 F | HEART RATE: 74 BPM | BODY MASS INDEX: 26.85 KG/M2 | DIASTOLIC BLOOD PRESSURE: 72 MMHG | WEIGHT: 174 LBS | SYSTOLIC BLOOD PRESSURE: 136 MMHG

## 2019-01-21 DIAGNOSIS — J40 BRONCHITIS: Primary | ICD-10-CM

## 2019-01-21 PROCEDURE — 99214 OFFICE O/P EST MOD 30 MIN: CPT | Performed by: INTERNAL MEDICINE

## 2019-01-21 RX ORDER — PROMETHAZINE HYDROCHLORIDE AND CODEINE PHOSPHATE 6.25; 1 MG/5ML; MG/5ML
SYRUP ORAL
Qty: 120 ML | Refills: 1 | Status: SHIPPED | OUTPATIENT
Start: 2019-01-21 | End: 2019-05-20

## 2019-01-21 RX ORDER — AZITHROMYCIN 250 MG/1
TABLET, FILM COATED ORAL
Qty: 6 TABLET | Refills: 0 | Status: SHIPPED | OUTPATIENT
Start: 2019-01-21 | End: 2019-02-28

## 2019-01-21 NOTE — PROGRESS NOTES
Subjective   Patricia F Bancroft is a 68 y.o. female.     History of Present Illness   Has been sick for about 5 days.  Started with a sore throat, now is congested and coughing with some productive sputum.  No fever or headache.    The following portions of the patient's history were reviewed and updated as appropriate: allergies, current medications, past medical history and problem list.    Review of Systems   Constitutional: Negative for fatigue and fever.   HENT: Positive for congestion and sore throat.    Eyes: Negative.    Respiratory: Positive for cough. Negative for chest tightness, shortness of breath and wheezing.    Cardiovascular: Negative.  Negative for chest pain, palpitations and leg swelling.   Gastrointestinal: Negative.  Negative for abdominal distention, abdominal pain and diarrhea.       Objective   Physical Exam   Constitutional: She appears well-developed and well-nourished.   HENT:   Mouth/Throat: Oropharynx is clear and moist.   Neck: Normal range of motion. Neck supple.   Cardiovascular: Normal rate, regular rhythm and normal heart sounds. Exam reveals no gallop and no friction rub.   No murmur heard.  Pulmonary/Chest: Effort normal. No stridor. No respiratory distress. She has no wheezes. She has no rales.   Few rhonchi bilaterally.   Abdominal: Soft.   Lymphadenopathy:     She has no cervical adenopathy.   Nursing note and vitals reviewed.        Assessment/Plan   Dahiana was seen today for cough.    Diagnoses and all orders for this visit:    Bronchitis  -     azithromycin (ZITHROMAX Z-ANG) 250 MG tablet; Take 2 tablets the first day, then 1 tablet daily for 4 days.  -     promethazine-codeine (PHENERGAN with CODEINE) 6.25-10 MG/5ML syrup; One teaspoon q 6 hours prn cough    Believe she has acute bronchitis.  Will treat as above.  She will call if not better.

## 2019-02-15 ENCOUNTER — TELEPHONE (OUTPATIENT)
Dept: INTERNAL MEDICINE | Facility: CLINIC | Age: 69
End: 2019-02-15

## 2019-02-15 NOTE — TELEPHONE ENCOUNTER
Called pt in regards to scheduling Subsequent Medicare AWV.  Pt scheduled with Simeon CUBA 2/28/19 11:00am. Thank you!

## 2019-02-28 ENCOUNTER — OFFICE VISIT (OUTPATIENT)
Dept: INTERNAL MEDICINE | Facility: CLINIC | Age: 69
End: 2019-02-28

## 2019-02-28 VITALS
HEIGHT: 68 IN | RESPIRATION RATE: 18 BRPM | DIASTOLIC BLOOD PRESSURE: 68 MMHG | SYSTOLIC BLOOD PRESSURE: 134 MMHG | WEIGHT: 174.6 LBS | HEART RATE: 71 BPM | TEMPERATURE: 97.8 F | BODY MASS INDEX: 26.46 KG/M2

## 2019-02-28 DIAGNOSIS — Z78.0 POST-MENOPAUSAL: ICD-10-CM

## 2019-02-28 DIAGNOSIS — Z87.891 PERSONAL HISTORY OF TOBACCO USE, PRESENTING HAZARDS TO HEALTH: ICD-10-CM

## 2019-02-28 DIAGNOSIS — Z00.00 MEDICARE ANNUAL WELLNESS VISIT, SUBSEQUENT: Primary | ICD-10-CM

## 2019-02-28 PROCEDURE — G0439 PPPS, SUBSEQ VISIT: HCPCS | Performed by: NURSE PRACTITIONER

## 2019-02-28 RX ORDER — LORATADINE 10 MG/1
CAPSULE, LIQUID FILLED ORAL
COMMUNITY
End: 2019-08-14

## 2019-02-28 NOTE — PROGRESS NOTES
QUICK REFERENCE INFORMATION:  The ABCs of the Annual Wellness Visit    Subsequent Medicare Wellness Visit    HEALTH RISK ASSESSMENT    1950    Recent Hospitalizations:  No hospitalization(s) within the last year..        Current Medical Providers:  Patient Care Team:  Nishant Dejesus MD as PCP - General  Nishant Dejesus MD as PCP - Family Medicine  Nishant Dejesus MD as PCP - Claims Attributed        Smoking Status:  Social History     Tobacco Use   Smoking Status Former Smoker   • Years: 40.00   • Types: Cigarettes   • Last attempt to quit: 2009   • Years since quitting: 10.1   Smokeless Tobacco Never Used       Alcohol Consumption:  Social History     Substance and Sexual Activity   Alcohol Use No       Depression Screen:   PHQ-2/PHQ-9 Depression Screening 2/28/2019   Little interest or pleasure in doing things 0   Feeling down, depressed, or hopeless 0   Trouble falling or staying asleep, or sleeping too much -   Feeling tired or having little energy -   Poor appetite or overeating -   Feeling bad about yourself - or that you are a failure or have let yourself or your family down -   Trouble concentrating on things, such as reading the newspaper or watching television -   Moving or speaking so slowly that other people could have noticed. Or the opposite - being so fidgety or restless that you have been moving around a lot more than usual -   Thoughts that you would be better off dead, or of hurting yourself in some way -   Total Score 0   If you checked off any problems, how difficult have these problems made it for you to do your work, take care of things at home, or get along with other people? -       Health Habits and Functional and Cognitive Screening:  Functional & Cognitive Status 2/28/2019   Do you have difficulty preparing food and eating? No   Do you have difficulty bathing yourself, getting dressed or grooming yourself? No   Do you have difficulty using the toilet? No   Do you have  difficulty moving around from place to place? No   Do you have trouble with steps or getting out of a bed or a chair? No   In the past year have you fallen or experienced a near fall? No   Current Diet Well Balanced Diet   Dental Exam Up to date   Eye Exam Up to date   Exercise (times per week) 3 times per week   Current Exercise Activities Include Aerobics   Do you need help using the phone?  No   Are you deaf or do you have serious difficulty hearing?  No   Do you need help with transportation? No   Do you need help shopping? No   Do you need help preparing meals?  No   Do you need help with housework?  No   Do you need help with laundry? No   Do you need help taking your medications? No   Do you need help managing money? No   Do you ever drive or ride in a car without wearing a seat belt? No   Have you felt unusual stress, anger or loneliness in the last month? No   Who do you live with? Spouse   If you need help, do you have trouble finding someone available to you? No   Have you been bothered in the last four weeks by sexual problems? No   Do you have difficulty concentrating, remembering or making decisions? No           Does the patient have evidence of cognitive impairment? No    Aspirin use counseling: Taking ASA appropriately as indicated      Recent Lab Results:  CMP:  Lab Results   Component Value Date    BUN 15 11/05/2018    CREATININE 0.78 11/05/2018    EGFRIFNONA 73 11/05/2018    BCR 19.2 11/05/2018     11/05/2018    K 4.7 11/05/2018    CO2 27.0 11/05/2018    CALCIUM 9.2 11/05/2018    ALBUMIN 4.34 11/05/2018    BILITOT 0.6 11/05/2018    ALKPHOS 60 11/05/2018    AST 20 11/05/2018    ALT 25 11/05/2018     Lipid Panel:  Lab Results   Component Value Date    CHOL 170 11/05/2018    TRIG 144 11/05/2018    HDL 51 11/05/2018     HbA1c:  Lab Results   Component Value Date    HGBA1C 6.00 (H) 11/05/2018       Visual Acuity:  No exam data present    Age-appropriate Screening Schedule:  Refer to the list  below for future screening recommendations based on patient's age, sex and/or medical conditions. Orders for these recommended tests are listed in the plan section. The patient has been provided with a written plan.    Health Maintenance   Topic Date Due   • ZOSTER VACCINE (2 of 2) 02/28/2019 (Originally 2/26/2014)   • LIPID PANEL  11/05/2019   • MAMMOGRAM  07/25/2020   • PAP SMEAR  06/17/2021   • TDAP/TD VACCINES (2 - Td) 08/01/2022   • COLONOSCOPY  04/17/2027   • INFLUENZA VACCINE  Completed   • PNEUMOCOCCAL VACCINES (65+ LOW/MEDIUM RISK)  Completed        Subjective   History of Present Illness    Patricia F Bancroft is a 68 y.o. female who presents for an Subsequent Wellness Visit.    The following portions of the patient's history were reviewed and updated as appropriate: allergies, current medications, past family history, past medical history, past social history, past surgical history and problem list.    Outpatient Medications Prior to Visit   Medication Sig Dispense Refill   • aspirin 162 MG EC tablet Take 162 mg by mouth daily.     • atorvastatin (LIPITOR) 80 MG tablet Take 1 tablet by mouth Every Night. 90 tablet 2   • estradiol (CLIMARA) 0.025 MG/24HR patch PLACE 1 PATCH ON THE SKIN  ONE TIME PER WEEK 12 patch 2   • lisinopril (PRINIVIL,ZESTRIL) 10 MG tablet TAKE 1 TABLET DAILY 90 tablet 2   • Loratadine (CLARITIN) 10 MG capsule Take  by mouth.     • metoprolol succinate XL (TOPROL-XL) 25 MG 24 hr tablet Take 1 tablet by mouth Daily. 90 tablet 2   • promethazine-codeine (PHENERGAN with CODEINE) 6.25-10 MG/5ML syrup One teaspoon q 6 hours prn cough 120 mL 1   • raNITIdine (ZANTAC) 150 MG tablet Take 150 mg by mouth Daily.     • azithromycin (ZITHROMAX Z-ANG) 250 MG tablet Take 2 tablets the first day, then 1 tablet daily for 4 days. 6 tablet 0   • metoprolol succinate XL (TOPROL-XL) 25 MG 24 hr tablet TAKE 1 TABLET DAILY 90 tablet 1   • metroNIDAZOLE (METROGEL) 1 % gel Apply  topically as needed.    "    No facility-administered medications prior to visit.        Patient Active Problem List   Diagnosis   • Coronary artery disease involving native coronary artery of native heart without angina pectoris   • Essential hypertension   • Mixed hyperlipidemia   • Elevated blood sugar   • Allergic rhinitis   • Chronic idiopathic constipation       Advance Care Planning:  has an advance directive - a copy HAS NOT been provided Patient states she does not want office to have a copy, because she fears form may become outdated or changed and will not be current.    Identification of Risk Factors:  Risk factors include: weight , increased fall risk and polypharmacy.    Review of Systems    Compared to one year ago, the patient feels her physical health is the same.  Compared to one year ago, the patient feels her mental health is the same.    Objective     Physical Exam    Vitals:    02/28/19 1101   BP: 134/68   Pulse: 71   Resp: 18   Temp: 97.8 °F (36.6 °C)   Weight: 79.2 kg (174 lb 9.6 oz)   Height: 171.5 cm (67.5\")       Patient's Body mass index is 26.94 kg/m². BMI is above normal parameters. Recommendations include: educational material and exercise counseling.      Assessment/Plan   Patient Self-Management and Personalized Health Advice  The patient has been provided with information about: diet, exercise, weight management, prevention of cardiac or vascular disease, the relationship between weight and GERD and fall prevention and preventive services including:   · Bone densitometry screening, Exercise counseling provided, Fall Risk assessment done.    Visit Diagnoses:    ICD-10-CM ICD-9-CM   1. Medicare annual wellness visit, subsequent Z00.00 V70.0   2. Post-menopausal Z78.0 V49.81   3. Personal history of tobacco use, presenting hazards to health Z87.891 V15.82       Orders Placed This Encounter   Procedures   • DEXA Bone Density Axial     Standing Status:   Future     Standing Expiration Date:   2/29/2020     Order " Specific Question:   Reason for Exam:     Answer:   post menopausal, osteopenia   • CT Chest Low Dose Wo     Standing Status:   Future     Standing Expiration Date:   2/28/2020     Order Specific Question:   The patient is age 55-77:     Answer:   68     Order Specific Question:   The patient is a current smoker?     Answer:   No     Order Specific Question:   The patient is a former smoker who has quit within the last 15 years?     Answer:   Yes     Order Specific Question:   The number of years since quitting smoking.     Answer:   10     Order Specific Question:   The patient has a smoking history of 30 pack-years or greater:     Answer:   Yes     Order Specific Question:   Actual pack - year smoking history (number):     Answer:   40     Order Specific Question:   Has the Patient had a Chest CT scan within the past 12 months?     Answer:   No     Order Specific Question:   Does the patient have any clinical signs/symptoms of lung cancer?     Answer:   No     Order Specific Question:   The patient was engaged in shared decision-making for this test:     Answer:   Yes       Outpatient Encounter Medications as of 2/28/2019   Medication Sig Dispense Refill   • aspirin 162 MG EC tablet Take 162 mg by mouth daily.     • atorvastatin (LIPITOR) 80 MG tablet Take 1 tablet by mouth Every Night. 90 tablet 2   • estradiol (CLIMARA) 0.025 MG/24HR patch PLACE 1 PATCH ON THE SKIN  ONE TIME PER WEEK 12 patch 2   • lisinopril (PRINIVIL,ZESTRIL) 10 MG tablet TAKE 1 TABLET DAILY 90 tablet 2   • Loratadine (CLARITIN) 10 MG capsule Take  by mouth.     • metoprolol succinate XL (TOPROL-XL) 25 MG 24 hr tablet Take 1 tablet by mouth Daily. 90 tablet 2   • promethazine-codeine (PHENERGAN with CODEINE) 6.25-10 MG/5ML syrup One teaspoon q 6 hours prn cough 120 mL 1   • raNITIdine (ZANTAC) 150 MG tablet Take 150 mg by mouth Daily.     • [DISCONTINUED] azithromycin (ZITHROMAX Z-ANG) 250 MG tablet Take 2 tablets the first day, then 1 tablet  daily for 4 days. 6 tablet 0   • [DISCONTINUED] metoprolol succinate XL (TOPROL-XL) 25 MG 24 hr tablet TAKE 1 TABLET DAILY 90 tablet 1   • [DISCONTINUED] metroNIDAZOLE (METROGEL) 1 % gel Apply  topically as needed.       No facility-administered encounter medications on file as of 2/28/2019.        Reviewed use of high risk medication in the elderly: yes  Reviewed for potential of harmful drug interactions in the elderly: yes    Follow Up:  Return if symptoms worsen or fail to improve, for F/U for Subsequent Wellness in one year & a day.     An After Visit Summary and PPPS with all of these plans were given to the patient.

## 2019-03-07 ENCOUNTER — HOSPITAL ENCOUNTER (OUTPATIENT)
Dept: BONE DENSITY | Facility: HOSPITAL | Age: 69
Discharge: HOME | End: 2019-03-07
Admitting: NURSE PRACTITIONER

## 2019-03-07 DIAGNOSIS — Z78.0 POST-MENOPAUSAL: ICD-10-CM

## 2019-03-07 PROCEDURE — 77080 DXA BONE DENSITY AXIAL: CPT

## 2019-03-14 ENCOUNTER — HOSPITAL ENCOUNTER (OUTPATIENT)
Dept: CT IMAGING | Facility: HOSPITAL | Age: 69
Discharge: HOME | End: 2019-03-14
Admitting: NURSE PRACTITIONER

## 2019-03-14 DIAGNOSIS — Z87.891 PERSONAL HISTORY OF TOBACCO USE, PRESENTING HAZARDS TO HEALTH: ICD-10-CM

## 2019-03-14 PROCEDURE — G0297 LDCT FOR LUNG CA SCREEN: HCPCS

## 2019-03-15 ENCOUNTER — TELEPHONE (OUTPATIENT)
Dept: INTERNAL MEDICINE | Facility: CLINIC | Age: 69
End: 2019-03-15

## 2019-03-15 NOTE — TELEPHONE ENCOUNTER
----- Message from ROSA ELENA Eason sent at 3/15/2019  8:44 AM EDT -----  Please inform patient-lung CT benign. Recommendation is to continue annual  low-dose screening.

## 2019-03-15 NOTE — TELEPHONE ENCOUNTER
Telephone call to thoroughly discuss benign changes on CT and need to monitor annually. Patient verbalized understanding and appreciation for call.

## 2019-04-05 DIAGNOSIS — I10 ESSENTIAL HYPERTENSION: ICD-10-CM

## 2019-04-05 RX ORDER — METOPROLOL SUCCINATE 25 MG/1
TABLET, EXTENDED RELEASE ORAL
Qty: 90 TABLET | Refills: 3 | Status: SHIPPED | OUTPATIENT
Start: 2019-04-05 | End: 2020-05-19 | Stop reason: SDUPTHER

## 2019-04-05 RX ORDER — LISINOPRIL 10 MG/1
TABLET ORAL
Qty: 90 TABLET | Refills: 2 | Status: SHIPPED | OUTPATIENT
Start: 2019-04-05 | End: 2019-05-20

## 2019-05-20 ENCOUNTER — OFFICE VISIT (OUTPATIENT)
Dept: INTERNAL MEDICINE | Facility: CLINIC | Age: 69
End: 2019-05-20

## 2019-05-20 VITALS
SYSTOLIC BLOOD PRESSURE: 128 MMHG | BODY MASS INDEX: 27.16 KG/M2 | RESPIRATION RATE: 17 BRPM | DIASTOLIC BLOOD PRESSURE: 68 MMHG | HEART RATE: 70 BPM | WEIGHT: 176 LBS

## 2019-05-20 DIAGNOSIS — I25.10 CORONARY ARTERY DISEASE INVOLVING NATIVE CORONARY ARTERY OF NATIVE HEART WITHOUT ANGINA PECTORIS: Primary | ICD-10-CM

## 2019-05-20 DIAGNOSIS — R73.9 ELEVATED BLOOD SUGAR: ICD-10-CM

## 2019-05-20 DIAGNOSIS — E78.2 MIXED HYPERLIPIDEMIA: ICD-10-CM

## 2019-05-20 DIAGNOSIS — I10 ESSENTIAL HYPERTENSION: ICD-10-CM

## 2019-05-20 LAB
EXPIRATION DATE: NORMAL
HBA1C MFR BLD: 6.1 %
Lab: NORMAL

## 2019-05-20 PROCEDURE — 83036 HEMOGLOBIN GLYCOSYLATED A1C: CPT | Performed by: INTERNAL MEDICINE

## 2019-05-20 PROCEDURE — 99214 OFFICE O/P EST MOD 30 MIN: CPT | Performed by: INTERNAL MEDICINE

## 2019-05-20 RX ORDER — ROSUVASTATIN CALCIUM 20 MG/1
20 TABLET, COATED ORAL DAILY
Qty: 90 TABLET | Refills: 3 | Status: SHIPPED | OUTPATIENT
Start: 2019-05-20 | End: 2019-12-02 | Stop reason: SDUPTHER

## 2019-05-20 NOTE — PROGRESS NOTES
Subjective   Patricia F Bancroft is a 68 y.o. female.     History of Present Illness   For follow up of  CAD, no chest pain, sob or palpitations.  HTN on meds, no headaches.  Hyperlipidemia on meds, no muscle aches.  Elevated blood sugar, no sx.    The following portions of the patient's history were reviewed and updated as appropriate: allergies, current medications, past medical history, past social history and problem list.    Review of Systems   Constitutional: Negative.  Negative for fatigue and fever.   Eyes: Negative.    Respiratory: Negative.  Negative for cough, chest tightness, shortness of breath and wheezing.    Cardiovascular: Negative.  Negative for chest pain, palpitations and leg swelling.   Gastrointestinal: Negative.  Negative for abdominal distention and abdominal pain.   Genitourinary: Negative.    Neurological: Negative.        Objective   Physical Exam   Constitutional: She appears well-developed and well-nourished.   Neck: Normal range of motion. Neck supple.   Cardiovascular: Normal rate, regular rhythm and normal heart sounds. Exam reveals no gallop and no friction rub.   No murmur heard.  Pulmonary/Chest: Effort normal and breath sounds normal. No respiratory distress. She has no wheezes. She has no rales. She exhibits no tenderness.   Abdominal: Soft. Bowel sounds are normal. She exhibits no distension and no mass. There is no tenderness. There is no guarding.   Musculoskeletal: She exhibits no edema.   Nursing note and vitals reviewed.        Assessment/Plan   Dahiana was seen today for essential hypertension.    Diagnoses and all orders for this visit:    Coronary artery disease involving native coronary artery of native heart without angina pectoris   Stable, no change.    Essential hypertension  Stable no change.    Mixed hyperlipidemia  Will change to crestor.    Elevated blood sugar  A1C = 6.1.    Other orders  -     estradiol (CLIMARA) 0.025 MG/24HR patch; Place 1 patch on the skin  as directed by provider 1 (One) Time Per Week.  -     rosuvastatin (CRESTOR) 20 MG tablet; Take 1 tablet by mouth Daily.    All problems are stable.  As above.  Recheck 6 months.

## 2019-05-23 ENCOUNTER — TELEPHONE (OUTPATIENT)
Dept: INTERNAL MEDICINE | Facility: CLINIC | Age: 69
End: 2019-05-23

## 2019-05-23 NOTE — TELEPHONE ENCOUNTER
----- Message from Melody Hess sent at 5/22/2019  4:06 PM EDT -----  Contact: Dahiana  Patient stated that [employer] messed up her prescription. She is requesting to have the estradiol be resent. She also wanted to make sure that it was the patches not the cream.    estradiol (CLIMARA) 0.025 MG/24HR patch Place 1 patch on the skin as directed by provider 1 (One) Time Per Week.     [facility] - [place], [place] - [address] AT Portal to Registered [facility] -  [phone] (Phone)[phone] (Fax)

## 2019-07-01 ENCOUNTER — TRANSCRIBE ORDERS (OUTPATIENT)
Dept: INTERNAL MEDICINE | Facility: CLINIC | Age: 69
End: 2019-07-01

## 2019-07-01 DIAGNOSIS — Z12.31 VISIT FOR SCREENING MAMMOGRAM: Primary | ICD-10-CM

## 2019-07-18 ENCOUNTER — OFFICE VISIT (OUTPATIENT)
Dept: INTERNAL MEDICINE | Facility: CLINIC | Age: 69
End: 2019-07-18

## 2019-07-18 VITALS
TEMPERATURE: 98.5 F | OXYGEN SATURATION: 95 % | BODY MASS INDEX: 26.97 KG/M2 | WEIGHT: 171.8 LBS | RESPIRATION RATE: 16 BRPM | HEIGHT: 67 IN | DIASTOLIC BLOOD PRESSURE: 70 MMHG | HEART RATE: 83 BPM | SYSTOLIC BLOOD PRESSURE: 110 MMHG

## 2019-07-18 DIAGNOSIS — H01.004 BLEPHARITIS OF LEFT UPPER EYELID, UNSPECIFIED TYPE: Primary | ICD-10-CM

## 2019-07-18 PROCEDURE — 99213 OFFICE O/P EST LOW 20 MIN: CPT | Performed by: PHYSICIAN ASSISTANT

## 2019-07-18 NOTE — PROGRESS NOTES
Chief Complaint   Patient presents with   • Conjunctivitis     x3 weeks, F/U left eye       Subjective       History of Present Illness     Patricia F Bancroft is a 68 y.o. female. She presents with 1 day history of redness of L eyelid, with recent conjunctivitis of both eyes. Pt had recent dx conjunctivitis of L eye, but developed similar but more mild symptoms of R eye as well. Dx 2019 at [facility] and prescribed Polytrim eye drops + PO Bactrim for possible skin infection of L upper eyelid. Pt states that this regimen did clear her symptoms, but yesterday noted that very small red bump appeared on L eyelid, which was similar to onset of previous episode. She states that this cynthia is a little itchy, but no pain. She denies any true conjunctivitis symptoms at this time, denies itching or irritation of eye, redness of eye, or discharge or crusting. She only has small red bump at L eyelid. Pt also states that she noticed a small red spot at top of R eyelid this morning, but not itchy. She denies any eye pain or pain with eye movement. She wears glasses but no contacts. She has not used any eye makeup since conjunctivitis dx 2+ weeks ago, and has thrown away her mascara. Denies fever, chills, HA, or any other concerns today.     The following portions of the patient's history were reviewed and updated as appropriate: allergies, current medications and problem list.    Allergies   Allergen Reactions   • Dust Mite Extract Cough, Itching and Other (See Comments)   • Ciprofloxacin Rash   • Ibuprofen Rash     Social History     Tobacco Use   • Smoking status: Former Smoker     Years: 40.00     Types: Cigarettes     Start date: 1966     Last attempt to quit: 2009     Years since quittin.9   • Smokeless tobacco: Never Used   Substance Use Topics   • Alcohol use: No         Current Outpatient Medications:   •  aspirin 162 MG EC tablet, Take 162 mg by mouth daily., Disp: , Rfl:   •  estradiol (CLIMARA) 0.025 MG/24HR  patch, Place 1 patch on the skin as directed by provider 1 (One) Time Per Week., Disp: 12 patch, Rfl: 3  •  lisinopril (PRINIVIL,ZESTRIL) 10 MG tablet, TAKE 1 TABLET DAILY, Disp: 90 tablet, Rfl: 2  •  Loratadine (CLARITIN) 10 MG capsule, Take  by mouth., Disp: , Rfl:   •  metoprolol succinate XL (TOPROL-XL) 25 MG 24 hr tablet, TAKE 1 TABLET DAILY, Disp: 90 tablet, Rfl: 3  •  raNITIdine (ZANTAC) 150 MG tablet, Take 150 mg by mouth Daily., Disp: , Rfl:   •  rosuvastatin (CRESTOR) 20 MG tablet, Take 1 tablet by mouth Daily., Disp: 90 tablet, Rfl: 3    Review of Systems   Constitutional: Negative for chills and fever.   HENT: Negative for congestion and sore throat.    Eyes: Negative for blurred vision, pain, discharge, redness, itching and visual disturbance.        +red upper L eyelid   Respiratory: Negative for shortness of breath.    Cardiovascular: Negative for chest pain.   Gastrointestinal: Negative for abdominal pain, nausea and vomiting.   Allergic/Immunologic: Negative for immunocompromised state.   Neurological: Negative for dizziness and headache.       Objective   Vitals:    07/18/19 1413   BP: 110/70   Pulse: 83   Resp: 16   Temp: 98.5 °F (36.9 °C)   SpO2: 95%     Physical Exam   Constitutional: She appears well-developed and well-nourished.   HENT:   Head: Normocephalic and atraumatic.   Mouth/Throat: Oropharynx is clear and moist.   Eyes: Conjunctivae and EOM are normal. Pupils are equal, round, and reactive to light. Right eye exhibits no chemosis and no discharge. Left eye exhibits no chemosis and no discharge. No scleral icterus.   +pinpoint area of pink, raised skin at L upper eyelid near lash line. No signs of conjunctivitis or other eye issue. No discharge, swelling, or TTP.    Cardiovascular: Normal rate and regular rhythm.   Pulmonary/Chest: Effort normal and breath sounds normal. She has no rales.         Assessment/Plan   Dahiana was seen today for conjunctivitis.    Diagnoses and all orders  for this visit:    Blepharitis of left upper eyelid, unspecified type      Possible early blepharitis or stye, but appears to be simple irritation and nearly negligible area of pink raised skin where pt complains of itching.  Advised using Elvis's baby shampoo on moist Qtip to clean this area BID, followed by warm compress.              Return if symptoms worsen or fail to improve.

## 2019-08-14 ENCOUNTER — OFFICE VISIT (OUTPATIENT)
Dept: CARDIOLOGY | Facility: CLINIC | Age: 69
End: 2019-08-14

## 2019-08-14 VITALS
WEIGHT: 174.8 LBS | DIASTOLIC BLOOD PRESSURE: 70 MMHG | HEART RATE: 78 BPM | HEIGHT: 67 IN | OXYGEN SATURATION: 96 % | BODY MASS INDEX: 27.44 KG/M2 | SYSTOLIC BLOOD PRESSURE: 118 MMHG

## 2019-08-14 DIAGNOSIS — I25.10 CORONARY ARTERY DISEASE INVOLVING NATIVE CORONARY ARTERY OF NATIVE HEART WITHOUT ANGINA PECTORIS: Primary | ICD-10-CM

## 2019-08-14 DIAGNOSIS — E78.2 MIXED HYPERLIPIDEMIA: ICD-10-CM

## 2019-08-14 DIAGNOSIS — I10 ESSENTIAL HYPERTENSION: ICD-10-CM

## 2019-08-14 PROCEDURE — 99214 OFFICE O/P EST MOD 30 MIN: CPT | Performed by: INTERNAL MEDICINE

## 2019-08-14 PROCEDURE — 93000 ELECTROCARDIOGRAM COMPLETE: CPT | Performed by: INTERNAL MEDICINE

## 2019-08-14 RX ORDER — ATORVASTATIN CALCIUM 80 MG/1
80 TABLET, FILM COATED ORAL DAILY
COMMUNITY
End: 2019-11-22

## 2019-08-14 RX ORDER — CETIRIZINE HYDROCHLORIDE 10 MG/1
10 TABLET ORAL DAILY
COMMUNITY
End: 2020-05-26

## 2019-08-14 NOTE — PROGRESS NOTES
[facility]  Office visit  Patricia F Bancroft  1950  [phone]    VISIT DATE:  8/14/2019      PCP: Nishant Dejesus MD  [address] [place], [place] [id number]    CC:  Chief Complaint   Patient presents with   • Coronary Artery Disease       PROBLEM LIST:  1. Coronary artery disease:  a. Status post drug-eluting stent to right coronary artery, May 2014, with no significant residual left system disease (EF 65%).  2. Essential Hypertension.  3. Dyslipidemia.  4. Seasonal allergies/allergic rhinitis.  5. History of remote tobacco use.  6. Hyperglycemia.  7. Surgical history:  a. Benign breast biopsy x2.  b. Hysterectomy with ovary removal.      ASSESSMENT:   Diagnosis Plan   1. Coronary artery disease involving native coronary artery of native heart without angina pectoris     2. Essential hypertension     3. Mixed hyperlipidemia         PLAN:  Coronary disease: Stable asymptomatic.  Continue aspirin 162 mg by mouth daily, high intensity statin therapy and afterload reduction with combination of lisinopril and metoprolol.    Hypertension: Goal less than 130/80.  Currently well-controlled.  Continue current medical therapy.    Hyperlipidemia: Goal LDL <70, this 2 more months of atorvastatin 80 mg p.o. daily and then will switch over to rosuvastatin 20 mg p.o. daily.  Fasting lipid panel pending prior to primary care physician follow-up in November.    Cardiac murmur: Consistent with aortic sclerosis.  Does report recurrent episodes of strep pharyngitis as a child but was never diagnosed with rheumatic fever.  We will continue to trend murmur clinically and evaluate with transthoracic echo if murmur increases in severity.    Subjective  Here for routine follow-up.  Denies dyspnea on exertion or palpitations.  Compliant with medical therapy.  Blood pressures running less than 130/80 mmHg.  Denies myalgias on statin therapy.  Denies chest pain.  Maintaining an active  "lifestyle without limitation.    PHYSICAL EXAMINATION:  Vitals:    08/14/19 1518   BP: 118/70   BP Location: Left arm   Patient Position: Sitting   Pulse: 78   SpO2: 96%   Weight: 79.3 kg (174 lb 12.8 oz)   Height: 170.2 cm (67\")     General Appearance:    Alert, cooperative, no distress, appears stated age   Head:    Normocephalic, without obvious abnormality, atraumatic   Eyes:    conjunctiva/corneas clear   Nose:   Nares normal, septum midline, mucosa normal, no drainage   Throat:   Lips, teeth and gums normal   Neck:   Supple, symmetrical, trachea midline, no carotid    bruit or JVD   Lungs:     Clear to auscultation bilaterally, respirations unlabored   Chest Wall:    No tenderness or deformity    Heart:    Regular rate and rhythm, S1 and S2 normal, 2/6 early peaking systolic murmur right upper sternal border, rub   or gallop, normal carotid impulse bilaterally without bruit.   Abdomen:     Soft, non-tender   Extremities:   Extremities normal, atraumatic, no cyanosis or edema   Pulses:   2+ and symmetric all extremities   Skin:   Skin color, texture, turgor normal, no rashes or lesions       Diagnostic Data:    ECG 12 Lead  Date/Time: 8/14/2019 3:54 PM  Performed by: Ghanshyam Padron III, MD  Authorized by: Ghanshyam Padron III, MD   Comparison: compared with previous ECG from 4/27/2016  Comparison to previous ECG: PVCs no longer present  Rhythm: sinus rhythm  Other findings: non-specific ST-T wave changes    Clinical impression: non-specific ECG          Lab Results   Component Value Date    CHLPL 172 04/27/2016    TRIG 144 11/05/2018    HDL 51 11/05/2018     Lab Results   Component Value Date    GLUCOSE 88 11/05/2018    BUN 15 11/05/2018    CREATININE 0.78 11/05/2018     11/05/2018    K 4.7 11/05/2018     11/05/2018    CO2 27.0 11/05/2018     Lab Results   Component Value Date    HGBA1C 6.1 05/20/2019     Lab Results   Component Value Date    WBC 6.72 11/05/2018    HGB 14.2 11/05/2018    HCT 43.4 " 11/05/2018     11/05/2018       Allergies  Allergies   Allergen Reactions   • Dust Mite Extract Cough, Itching and Other (See Comments)   • Ciprofloxacin Rash   • Ibuprofen Rash       Current Medications    Current Outpatient Medications:   •  aspirin 162 MG EC tablet, Take 162 mg by mouth daily., Disp: , Rfl:   •  atorvastatin (LIPITOR) 80 MG tablet, Take 80 mg by mouth Daily. Pt says she is finishing her Atorvastatin 80 mg then she will start taking the Rosuvastatin 20 mg, Disp: , Rfl:   •  cetirizine (zyrTEC) 10 MG tablet, Take 10 mg by mouth Daily., Disp: , Rfl:   •  estradiol (CLIMARA) 0.025 MG/24HR patch, Place 1 patch on the skin as directed by provider 1 (One) Time Per Week., Disp: 12 patch, Rfl: 3  •  lisinopril (PRINIVIL,ZESTRIL) 10 MG tablet, TAKE 1 TABLET DAILY, Disp: 90 tablet, Rfl: 2  •  metoprolol succinate XL (TOPROL-XL) 25 MG 24 hr tablet, TAKE 1 TABLET DAILY, Disp: 90 tablet, Rfl: 3  •  raNITIdine (ZANTAC) 150 MG tablet, Take 150 mg by mouth Daily., Disp: , Rfl:   •  rosuvastatin (CRESTOR) 20 MG tablet, Take 1 tablet by mouth Daily., Disp: 90 tablet, Rfl: 3          ROS  Review of Systems   Cardiovascular: Negative for chest pain, dyspnea on exertion and irregular heartbeat.   Respiratory: Negative for cough.          SOCIAL HX  Social History     Socioeconomic History   • Marital status:      Spouse name: Not on file   • Number of children: Not on file   • Years of education: Not on file   • Highest education level: Not on file   Tobacco Use   • Smoking status: Former Smoker     Packs/day: 1.00     Years: 40.00     Pack years: 40.00     Types: Cigarettes     Start date: 1/1/1966     Last attempt to quit: 2009     Years since quitting: 10.0   • Smokeless tobacco: Never Used   Substance and Sexual Activity   • Alcohol use: No   • Drug use: No   • Sexual activity: Yes     Partners: Male     Birth control/protection: Post-menopausal       FAMILY HX  Family History   Problem Relation Age  of Onset   • Cancer Mother         Lung cancer;  Dec 1996   • Cancer Father         Primary site unknown;  1996   • Cancer Sister         Pancreatic Stage 1 diagnosed end of Dec 2017; chemo then surgery then chemo ending Oct 5 2018. Doing well so far.   • Breast cancer Neg Hx    • Ovarian cancer Neg Hx              Ghanshyam Padron III, MD, FACC

## 2019-08-21 ENCOUNTER — HOSPITAL ENCOUNTER (OUTPATIENT)
Dept: MAMMOGRAPHY | Facility: HOSPITAL | Age: 69
Discharge: HOME | End: 2019-08-21
Admitting: INTERNAL MEDICINE

## 2019-08-21 DIAGNOSIS — Z12.31 VISIT FOR SCREENING MAMMOGRAM: ICD-10-CM

## 2019-08-21 PROCEDURE — 77063 BREAST TOMOSYNTHESIS BI: CPT | Performed by: RADIOLOGY

## 2019-08-21 PROCEDURE — 77063 BREAST TOMOSYNTHESIS BI: CPT

## 2019-08-21 PROCEDURE — 77067 SCR MAMMO BI INCL CAD: CPT | Performed by: RADIOLOGY

## 2019-08-21 PROCEDURE — 77067 SCR MAMMO BI INCL CAD: CPT

## 2019-08-30 ENCOUNTER — TELEPHONE (OUTPATIENT)
Dept: INTERNAL MEDICINE | Facility: CLINIC | Age: 69
End: 2019-08-30

## 2019-10-01 ENCOUNTER — FLU SHOT (OUTPATIENT)
Dept: INTERNAL MEDICINE | Facility: CLINIC | Age: 69
End: 2019-10-01

## 2019-10-01 DIAGNOSIS — Z23 IMMUNIZATION, PNEUMOCOCCUS AND INFLUENZA: ICD-10-CM

## 2019-10-01 PROCEDURE — G0008 ADMIN INFLUENZA VIRUS VAC: HCPCS | Performed by: INTERNAL MEDICINE

## 2019-10-01 PROCEDURE — 90653 IIV ADJUVANT VACCINE IM: CPT | Performed by: INTERNAL MEDICINE

## 2019-10-08 ENCOUNTER — OFFICE VISIT (OUTPATIENT)
Dept: INTERNAL MEDICINE | Facility: CLINIC | Age: 69
End: 2019-10-08

## 2019-10-08 VITALS
RESPIRATION RATE: 19 BRPM | HEART RATE: 82 BPM | DIASTOLIC BLOOD PRESSURE: 74 MMHG | WEIGHT: 173 LBS | SYSTOLIC BLOOD PRESSURE: 130 MMHG | BODY MASS INDEX: 27.1 KG/M2

## 2019-10-08 DIAGNOSIS — H00.12 CHALAZION OF RIGHT LOWER EYELID: Primary | ICD-10-CM

## 2019-10-08 PROCEDURE — 99213 OFFICE O/P EST LOW 20 MIN: CPT | Performed by: INTERNAL MEDICINE

## 2019-10-08 NOTE — PROGRESS NOTES
Subjective   Patricia F Bancroft is a 69 y.o. female.     History of Present Illness   She has been having recurrent swelling and redness on lower lids of both eyes since June.  She has taken a round of antibiotics but no help.  Now it is on her right lower lid.  No fever or real discharge.    The following portions of the patient's history were reviewed and updated as appropriate: allergies, current medications, past medical history, past social history and problem list.    Review of Systems   Eyes: Positive for redness.       Objective   Physical Exam   Eyes:   Has redness and swelling on right lower lid laterally.  She has a mole which makes it look like a head of pus but is not.  No obvious conjunctivitis.         Assessment/Plan   Dahiana was seen today for stye.    Diagnoses and all orders for this visit:    Chalazion of right lower eyelid    She seems to be having recurrent or slow to heal chalazion of both eyes.  Discussed warm packs to right lower eye at least bid.  She needs to see the eye md.  She will call.

## 2019-11-22 ENCOUNTER — OFFICE VISIT (OUTPATIENT)
Dept: INTERNAL MEDICINE | Facility: CLINIC | Age: 69
End: 2019-11-22

## 2019-11-22 VITALS
RESPIRATION RATE: 17 BRPM | WEIGHT: 174 LBS | DIASTOLIC BLOOD PRESSURE: 72 MMHG | HEART RATE: 70 BPM | BODY MASS INDEX: 27.25 KG/M2 | SYSTOLIC BLOOD PRESSURE: 126 MMHG

## 2019-11-22 DIAGNOSIS — E78.2 MIXED HYPERLIPIDEMIA: ICD-10-CM

## 2019-11-22 DIAGNOSIS — Z79.899 DRUG THERAPY: ICD-10-CM

## 2019-11-22 DIAGNOSIS — I10 ESSENTIAL HYPERTENSION: ICD-10-CM

## 2019-11-22 DIAGNOSIS — I25.10 CORONARY ARTERY DISEASE INVOLVING NATIVE CORONARY ARTERY OF NATIVE HEART WITHOUT ANGINA PECTORIS: Primary | ICD-10-CM

## 2019-11-22 DIAGNOSIS — R73.9 ELEVATED BLOOD SUGAR: ICD-10-CM

## 2019-11-22 LAB
ALBUMIN SERPL-MCNC: 4.4 G/DL (ref 3.5–5.2)
ALBUMIN/GLOB SERPL: 1.3 G/DL
ALP SERPL-CCNC: 53 U/L (ref 39–117)
ALT SERPL W P-5'-P-CCNC: 21 U/L (ref 1–33)
ANION GAP SERPL CALCULATED.3IONS-SCNC: 12.3 MMOL/L (ref 5–15)
AST SERPL-CCNC: 22 U/L (ref 1–32)
BILIRUB SERPL-MCNC: 0.4 MG/DL (ref 0.2–1.2)
BUN BLD-MCNC: 13 MG/DL (ref 8–23)
BUN/CREAT SERPL: 18.1 (ref 7–25)
CALCIUM SPEC-SCNC: 9.4 MG/DL (ref 8.6–10.5)
CHLORIDE SERPL-SCNC: 104 MMOL/L (ref 98–107)
CHOLEST SERPL-MCNC: 189 MG/DL (ref 0–200)
CO2 SERPL-SCNC: 25.7 MMOL/L (ref 22–29)
CREAT BLD-MCNC: 0.72 MG/DL (ref 0.57–1)
DEPRECATED RDW RBC AUTO: 41.7 FL (ref 37–54)
ERYTHROCYTE [DISTWIDTH] IN BLOOD BY AUTOMATED COUNT: 12.2 % (ref 12.3–15.4)
GFR SERPL CREATININE-BSD FRML MDRD: 80 ML/MIN/1.73
GLOBULIN UR ELPH-MCNC: 3.3 GM/DL
GLUCOSE BLD-MCNC: 91 MG/DL (ref 65–99)
HBA1C MFR BLD: 6.42 % (ref 4.8–5.6)
HCT VFR BLD AUTO: 43.6 % (ref 34–46.6)
HDLC SERPL-MCNC: 66 MG/DL (ref 40–60)
HGB BLD-MCNC: 14.4 G/DL (ref 12–15.9)
LDLC SERPL CALC-MCNC: 98 MG/DL (ref 0–100)
LDLC/HDLC SERPL: 1.48 {RATIO}
MCH RBC QN AUTO: 31 PG (ref 26.6–33)
MCHC RBC AUTO-ENTMCNC: 33 G/DL (ref 31.5–35.7)
MCV RBC AUTO: 93.8 FL (ref 79–97)
PLATELET # BLD AUTO: 348 10*3/MM3 (ref 140–450)
PMV BLD AUTO: 10.9 FL (ref 6–12)
POTASSIUM BLD-SCNC: 4.8 MMOL/L (ref 3.5–5.2)
PROT SERPL-MCNC: 7.7 G/DL (ref 6–8.5)
RBC # BLD AUTO: 4.65 10*6/MM3 (ref 3.77–5.28)
SODIUM BLD-SCNC: 142 MMOL/L (ref 136–145)
TRIGL SERPL-MCNC: 127 MG/DL (ref 0–150)
VLDLC SERPL-MCNC: 25.4 MG/DL (ref 5–40)
WBC NRBC COR # BLD: 6.58 10*3/MM3 (ref 3.4–10.8)

## 2019-11-22 PROCEDURE — 80053 COMPREHEN METABOLIC PANEL: CPT | Performed by: INTERNAL MEDICINE

## 2019-11-22 PROCEDURE — 80061 LIPID PANEL: CPT | Performed by: INTERNAL MEDICINE

## 2019-11-22 PROCEDURE — 83036 HEMOGLOBIN GLYCOSYLATED A1C: CPT | Performed by: INTERNAL MEDICINE

## 2019-11-22 PROCEDURE — 99214 OFFICE O/P EST MOD 30 MIN: CPT | Performed by: INTERNAL MEDICINE

## 2019-11-22 PROCEDURE — 36415 COLL VENOUS BLD VENIPUNCTURE: CPT | Performed by: INTERNAL MEDICINE

## 2019-11-22 PROCEDURE — 85027 COMPLETE CBC AUTOMATED: CPT | Performed by: INTERNAL MEDICINE

## 2019-11-22 RX ORDER — FAMOTIDINE 10 MG
10 TABLET ORAL 2 TIMES DAILY
COMMUNITY
End: 2020-05-27

## 2019-11-22 RX ORDER — DOXYCYCLINE 100 MG/1
100 CAPSULE ORAL DAILY
Qty: 30 CAPSULE | Refills: 3
Start: 2019-11-22 | End: 2020-05-26

## 2019-11-22 NOTE — PROGRESS NOTES
Subjective Patricia F Bancroft is a 69 y.o. female.     History of Present Illness   For follow up of  CAD no chest pain, sob or palpitations.  HTN on meds,no headaches.  Hyperlipidemia on meds,no muscle aches.  Elevated blood sugar. No new sx.        The following portions of the patient's history were reviewed and updated as appropriate: allergies, current medications, past family history, past medical history, past social history, past surgical history and problem list.    Review of Systems   Constitutional: Negative.  Negative for appetite change, fatigue and fever.   HENT: Negative.  Negative for congestion, ear pain, hearing loss, rhinorrhea, sinus pressure, sore throat, tinnitus and trouble swallowing.    Eyes: Negative.  Negative for redness and visual disturbance.   Respiratory: Negative.  Negative for cough, chest tightness, shortness of breath and wheezing.    Cardiovascular: Negative.  Negative for chest pain, palpitations and leg swelling.   Gastrointestinal: Positive for constipation. Negative for abdominal distention, abdominal pain, blood in stool, diarrhea, nausea and vomiting.   Endocrine: Negative.  Negative for polydipsia, polyphagia and polyuria.   Genitourinary: Negative.  Negative for difficulty urinating, dysuria, flank pain, frequency, menstrual problem, pelvic pain, vaginal bleeding and vaginal discharge.   Musculoskeletal: Negative.  Negative for arthralgias, back pain, gait problem, joint swelling and neck pain.   Skin: Negative for rash.   Allergic/Immunologic: Negative.  Negative for environmental allergies.   Neurological: Negative.  Negative for dizziness, tremors, seizures, weakness, numbness and confusion.   Hematological: Negative.  Negative for adenopathy.   Psychiatric/Behavioral: Negative.  Negative for agitation, dysphoric mood, sleep disturbance and depressed mood. The patient is not nervous/anxious.        Objective   Physical Exam   Constitutional: She is oriented to  person, place, and time. She appears well-developed and well-nourished.   HENT:   Head: Normocephalic and atraumatic.   Right Ear: External ear normal.   Left Ear: External ear normal.   Nose: Nose normal.   Mouth/Throat: Oropharynx is clear and moist.   Eyes: Conjunctivae and EOM are normal. Pupils are equal, round, and reactive to light.   Neck: Normal range of motion. Neck supple. No thyromegaly present.   Cardiovascular: Normal rate, regular rhythm, normal heart sounds and intact distal pulses. Exam reveals no gallop and no friction rub.   No murmur heard.  Carotids normal.   Pulmonary/Chest: Effort normal and breath sounds normal. No respiratory distress. She has no wheezes. She has no rales. She exhibits no tenderness. Right breast exhibits no mass, no skin change and no tenderness. Left breast exhibits no mass, no skin change and no tenderness.   Abdominal: Soft. Bowel sounds are normal. She exhibits no distension and no mass. There is no tenderness.   Musculoskeletal: Normal range of motion. She exhibits no edema.   Lymphadenopathy:     She has no cervical adenopathy.   Neurological: She is alert and oriented to person, place, and time. She has normal reflexes. No cranial nerve deficit.   Skin: Skin is warm and dry.   Psychiatric: She has a normal mood and affect. Her behavior is normal. Judgment and thought content normal.   Nursing note and vitals reviewed.        Assessment/Plan   Dahiana was seen today for essential hypertension.    Diagnoses and all orders for this visit:    Coronary artery disease involving native coronary artery of native heart without angina pectoris  Stable, no change.    Essential hypertension  Stable, no change.    Mixed hyperlipidemia  -     Comprehensive Metabolic Panel  -     Lipid Panel  Stable, no change.    Elevated blood sugar  -     Hemoglobin A1c    Drug therapy  -     CBC (No Diff)    Other orders  -     doxycycline (MONODOX) 100 MG capsule; Take 1 capsule by mouth  Daily.    All problems are stable.  Same meds.  Labs as noted.  Recheck 6 months.

## 2019-12-02 ENCOUNTER — TELEPHONE (OUTPATIENT)
Dept: INTERNAL MEDICINE | Facility: CLINIC | Age: 69
End: 2019-12-02

## 2019-12-02 RX ORDER — ROSUVASTATIN CALCIUM 40 MG/1
40 TABLET, COATED ORAL DAILY
Qty: 90 TABLET | Refills: 3 | Status: SHIPPED | OUTPATIENT
Start: 2019-12-02 | End: 2020-03-18 | Stop reason: SDUPTHER

## 2019-12-02 NOTE — TELEPHONE ENCOUNTER
Patient would like to get a dose increase on her ROSUVASTATIN 20 MG medication. Pt states she received a letter in the mail advising her to call in to get the increase on the medication. The patient can be reached at [phone]. And the medication refill will go to the [facility] that is in her chart.

## 2020-02-17 NOTE — TELEPHONE ENCOUNTER
PATIENT IS REQUESTING A REFILL FOR   estradiol (CLIMARA) 0.025 MG/24HR patch    PATIENT CALL BACK [phone]    [facility] [address] [phone]

## 2020-03-05 RX ORDER — LISINOPRIL 10 MG/1
TABLET ORAL
Qty: 90 TABLET | Refills: 2 | Status: SHIPPED | OUTPATIENT
Start: 2020-03-05 | End: 2020-05-19 | Stop reason: SDUPTHER

## 2020-03-18 ENCOUNTER — TELEPHONE (OUTPATIENT)
Dept: INTERNAL MEDICINE | Facility: CLINIC | Age: 70
End: 2020-03-18

## 2020-03-18 RX ORDER — ROSUVASTATIN CALCIUM 40 MG/1
40 TABLET, COATED ORAL DAILY
Qty: 90 TABLET | Refills: 3 | Status: SHIPPED | OUTPATIENT
Start: 2020-03-18 | End: 2021-02-24 | Stop reason: SDUPTHER

## 2020-03-18 NOTE — TELEPHONE ENCOUNTER
Patient requested a call back. Patient stated she has headache, nasal congestion, and yellow phlegm. This was first noticed on 3/7/020. Patient would like to know if an antibiotic can be sent in for her or if she needs to be seen.     Patient would like a refill of rosuvastatin (CRESTOR) 40 MG tablet    [facility] [address] [facility] in [place] confirmed    Please call and advise. Patient call back [phone]

## 2020-05-19 DIAGNOSIS — I10 ESSENTIAL HYPERTENSION: ICD-10-CM

## 2020-05-19 RX ORDER — METOPROLOL SUCCINATE 25 MG/1
25 TABLET, EXTENDED RELEASE ORAL DAILY
Qty: 90 TABLET | Refills: 3 | Status: SHIPPED | OUTPATIENT
Start: 2020-05-19 | End: 2021-03-19

## 2020-05-19 RX ORDER — LISINOPRIL 10 MG/1
10 TABLET ORAL DAILY
Qty: 90 TABLET | Refills: 1 | Status: SHIPPED | OUTPATIENT
Start: 2020-05-19 | End: 2020-10-28 | Stop reason: SDUPTHER

## 2020-05-26 NOTE — PROGRESS NOTES
Adult New Patient Visit:  Patient Care Team:  Nishant Dejesus MD as PCP - General  Nishant Dejesus MD as PCP - Family Medicine  Nishant Dejesus MD as PCP - Claims Attributed    Chief Complaint   Patient presents with   • [facility]       Patricia F Bancroft is a 69 y.o. female who presents to [facility]. Previous PCP was Dr. Dejesus who will be retiring from this office.    Overdue for [facility] (last 2/28/2019)    HPI Issues discussed today:    1.  Hypertension/CAD/hyperlipidemia:  Status post JAN to RCA 05/2014.  On metoprolol XL 25 mg daily, lisinopril 10 mg daily, aspirin 162 mg daily, Crestor 40 mg daily.  Follows with Teresa Kern PA-C, Dr. Padron.  Next 8/19/2020.  No headache, chest pain, shortness of breath, palpitations, PND, lower extremity edema.  Lab Results   Component Value Date    CHOL 189 11/22/2019    CHLPL 172 04/27/2016    TRIG 127 11/22/2019    HDL 66 (H) 11/22/2019    LDL 98 11/22/2019       2.  GERD:  On Pepcid 20mg daily.  Symptoms are not well controlled.  She has ongoing retrosternal burning and indigestion.  She denies any vomiting or blood in stools.    3.  Postmenopausal HRT:  On estradiol 0.025 mg transdermal patch weekly.  Symptoms controlled.    4. Ear itching:  Complains of bilateral ear itching for the last few months.  No drainage or pain.  No fevers.  She has previously seen an allergist for immunotherapy but was stopped.  She is currently alternating between Zyrtec or Claritin (not of the same day).  She does not take any nasal sprays.    5. Prediabetes:  Lab Results   Component Value Date    HGBA1C 6.42 (H) 11/22/2019   Would like her A1c checked.  No specific diet.    6. Constipation:  Chronic since childhood.  She typically takes Colace twice daily but not helpful.  MiraLAX caused increased gassiness.  She takes an OTC smooth move tea with senna which caused liang diarrhea.  She is wondering what else to try.  No blood in stools.    Review of Systems    Constitutional: Negative for activity change, appetite change and fever.   HENT: Negative for congestion, sneezing and sore throat.         Ear itching   Eyes: Negative for discharge and visual disturbance.   Respiratory: Negative for cough and shortness of breath.    Cardiovascular: Negative for chest pain and palpitations.   Gastrointestinal: Positive for constipation and GERD. Negative for abdominal distention, abdominal pain, blood in stool, nausea and vomiting.   Endocrine: Negative for cold intolerance and heat intolerance.   Genitourinary: Negative for dysuria.   Musculoskeletal: Negative for neck stiffness.   Skin: Negative for rash.   Allergic/Immunologic: Negative for environmental allergies and food allergies.   Neurological: Negative for headache.   Hematological: Negative for adenopathy.   Psychiatric/Behavioral: Negative for depressed mood.        History  Past Medical History:   Diagnosis Date   • Allergic    • Allergic rhinitis     Seasonal allergies   • Coronary artery disease     S/P JAN to RCA, May 2014 EF 65% no residual Left system disease   • Dyslipidemia    • Dysosmia    • Hx of migraines    • Hyperlipidemia    • Hypertension       Past Surgical History:   Procedure Laterality Date   • BREAST BIOPSY Left    • BREAST EXCISIONAL BIOPSY Left    • BREAST SURGERY      breast biopsy X2   • CARDIAC CATHETERIZATION  2014    Also    • CORONARY STENT PLACEMENT  2014   • HYSTERECTOMY     • OOPHORECTOMY     • OTHER SURGICAL HISTORY      Stent placed in the right coronary artery 6 weeks ago      Allergies   Allergen Reactions   • Dust Mite Extract Cough, Itching and Other (See Comments)   • Ciprofloxacin Rash   • Ibuprofen Rash      Family History   Problem Relation Age of Onset   • Cancer Mother         Lung cancer;  Dec 1996   • Cancer Father         Primary site unknown;  1996   • Cancer Sister         Pancreatic Stage 1 diagnosed end of Dec 2017; chemo then surgery  then chemo ending Oct 5 2018. Doing well so far.   • Breast cancer Neg Hx    • Ovarian cancer Neg Hx       Social History     Socioeconomic History   • Marital status:      Spouse name: Not on file   • Number of children: Not on file   • Years of education: Not on file   • Highest education level: Not on file   Tobacco Use   • Smoking status: Former Smoker     Packs/day: 1.00     Years: 40.00     Pack years: 40.00     Types: Cigarettes     Start date: 1/1/1966     Last attempt to quit: 2009     Years since quitting: 10.7   • Smokeless tobacco: Never Used   Substance and Sexual Activity   • Alcohol use: No   • Drug use: No   • Sexual activity: Yes     Partners: Male     Birth control/protection: Post-menopausal        Current Outpatient Medications:   •  aspirin 162 MG EC tablet, Take 162 mg by mouth daily., Disp: , Rfl:   •  docusate sodium (COLACE) 100 MG capsule, Take 100 mg by mouth 2 (Two) Times a Day., Disp: , Rfl:   •  estradiol (CLIMARA) 0.025 MG/24HR patch, Place 1 patch on the skin as directed by provider 1 (One) Time Per Week., Disp: 12 patch, Rfl: 3  •  lisinopril (PRINIVIL,ZESTRIL) 10 MG tablet, Take 1 tablet by mouth Daily., Disp: 90 tablet, Rfl: 1  •  metoprolol succinate XL (TOPROL-XL) 25 MG 24 hr tablet, Take 1 tablet by mouth Daily., Disp: 90 tablet, Rfl: 3  •  polyethylene glycol (MIRALAX) packet, Take 17 g by mouth Daily., Disp: , Rfl:   •  rosuvastatin (CRESTOR) 40 MG tablet, Take 1 tablet by mouth Daily., Disp: 90 tablet, Rfl: 3  •  famotidine (Pepcid) 20 MG tablet, Take 1 tablet by mouth 2 (Two) Times a Day., Disp: 60 tablet, Rfl: 0    Health Maintenance   Topic Date Due   • ZOSTER VACCINE (2 of 2) 02/26/2014   • HEPATITIS C SCREENING  07/14/2016   • MEDICARE ANNUAL WELLNESS  02/28/2020   • LUNG CANCER SCREENING  03/14/2020   • INFLUENZA VACCINE  08/01/2020   • LIPID PANEL  11/22/2020   • PAP SMEAR  06/17/2021   • MAMMOGRAM  08/21/2021   • TDAP/TD VACCINES (2 - Td) 08/01/2022   •  COLONOSCOPY  04/17/2027   • Pneumococcal Vaccine Once at 65 Years Old  Completed       Immunizations  Td/Tdap(Booster Q 10 yrs):  Not covered unless 2/2 trauma.  Pneumovax: UTD  Shringrix: D[facility] at [facility] [place] [facility]  Immunization History   Administered Date(s) Administered   • FLUAD TRI 65YR+ 10/01/2019   • Fluzone High Dose =>65 Years ([employer] ONLY) 10/03/2016, 10/03/2017, 10/01/2018   • Pneumococcal Conjugate 13-Valent (PCV13) 09/24/2015   • Pneumococcal Polysaccharide (PPSV23) 10/17/2016     Hemoglobin A1C   Date Value Ref Range Status   11/22/2019 6.42 (H) 4.80 - 5.60 % Final     Lab Results   Component Value Date    CHOL 189 11/22/2019    CHLPL 172 04/27/2016    TRIG 127 11/22/2019    HDL 66 (H) 11/22/2019    LDL 98 11/22/2019     Colorectal Screening: Discuss at [facility] [place] [facility]  Pap: Discuss at [facility] [place] [facility]  Mammogram:  8/21/19; BI-RADS 2  PSA(Over age 50):  N/A  US Aorta (For male smokers, age 65):  N/A  CT for Smoker (Age 55-75, 30pk yr):  N/A  Bone Density/DEXA:  3/7/19; osteopenia left femoral neck  Hep C: Discuss at [facility] [place] [facility]    Vitals:    05/27/20 1016   BP: 122/70   Pulse: 84   Resp: 21   Weight: 80.7 kg (178 lb)         Physical Exam   Constitutional: She is oriented to person, place, and time. She appears well-developed and well-nourished. No distress.   HENT:   Head: Normocephalic and atraumatic.   Right Ear: Tympanic membrane and external ear normal.   Left Ear: Tympanic membrane and external ear normal.   Mouth/Throat: Oropharynx is clear and moist. No oropharyngeal exudate.   Eyes: Pupils are equal, round, and reactive to light. Conjunctivae and EOM are normal. Right eye exhibits no discharge. Left eye exhibits no discharge. No scleral icterus.   Neck: Normal range of motion. Neck supple. No JVD present. No tracheal deviation present. No thyromegaly present.   Cardiovascular: Normal rate, regular rhythm and normal heart sounds. Exam reveals no gallop and no friction rub.   No murmur heard.  Pulmonary/Chest: Effort normal and breath  sounds normal. No stridor. No respiratory distress. She has no wheezes. She has no rales.   Abdominal: Soft. Bowel sounds are normal. She exhibits no distension and no mass. There is no tenderness. There is no rebound and no guarding.   Musculoskeletal: She exhibits no edema.   Lymphadenopathy:     She has no cervical adenopathy.   Neurological: She is alert and oriented to person, place, and time. She exhibits normal muscle tone.   Skin: Skin is warm and dry. Capillary refill takes less than 2 seconds. No rash noted. She is not diaphoretic.   Psychiatric: She has a normal mood and affect.   Vitals reviewed.       Assessment and Plan: 69 y.o. female here to establish care  Prediabetes  Check A1c today.  Lifestyle recommendations reviewed.    Gastroesophageal reflux disease  Suboptimally controlled possibly due to taking medication daily instead of twice daily as intended.  Rx for Pepcid 20 mg twice daily sent x1 month supply.  Patient to call me in 3 weeks to let me know how she is doing.  If symptoms are still suboptimally controlled may consider step up to PPI.  May also need EGD in the future if we cannot control symptoms or if there is new symptoms like pain, vomiting, blood in stools etc.    Chronic idiopathic constipation  Chronic.  Not well controlled on Colace alone.  Discussed another trial of MiraLAX.  If 1 capful daily caused increased gassiness, try one quarter or one half cap daily and titrate for goal of 1 soft formed stool daily.  If this still causes increased intestinal gas, to let us know.  We can explore alternatives like senna (not ideal as it caused diarrhea per patient) versus lactulose.  Needs to let us know if any blood in stools or other concerns.    Allergic rhinitis  Suspect her ear symptoms are from poorly controlled allergies.  In addition to daily oral nonsedating antihistamine like Claritin or Zyrtec, she will resume Flonase.  If symptoms are still suboptimally controlled to let me  know.    Mixed hyperlipidemia  Stable.  Continue Crestor.  FLP when patient returns.  Cardiology follow-up as scheduled.    Essential hypertension  Stable.  Continue Toprol-XL and lisinopril.  CMP when patient returns for labs.    Coronary artery disease involving native coronary artery of native heart without angina pectoris  Stable.  Cardiology follow-up as scheduled.  Continue beta-blocker, ACE inhibitor, aspirin, statin.    Return in about 6 months (around 11/27/2020) for Medicare Wellness Visit, As needed if no improvement or new symptoms.    Lizett Stratton MD  5/27/2020

## 2020-05-27 ENCOUNTER — OFFICE VISIT (OUTPATIENT)
Dept: INTERNAL MEDICINE | Facility: CLINIC | Age: 70
End: 2020-05-27

## 2020-05-27 VITALS
SYSTOLIC BLOOD PRESSURE: 122 MMHG | BODY MASS INDEX: 27.88 KG/M2 | DIASTOLIC BLOOD PRESSURE: 70 MMHG | HEART RATE: 84 BPM | WEIGHT: 178 LBS | RESPIRATION RATE: 21 BRPM

## 2020-05-27 DIAGNOSIS — E78.2 MIXED HYPERLIPIDEMIA: ICD-10-CM

## 2020-05-27 DIAGNOSIS — I25.10 CORONARY ARTERY DISEASE INVOLVING NATIVE CORONARY ARTERY OF NATIVE HEART WITHOUT ANGINA PECTORIS: ICD-10-CM

## 2020-05-27 DIAGNOSIS — I10 ESSENTIAL HYPERTENSION: ICD-10-CM

## 2020-05-27 DIAGNOSIS — K21.9 GASTROESOPHAGEAL REFLUX DISEASE, ESOPHAGITIS PRESENCE NOT SPECIFIED: Primary | ICD-10-CM

## 2020-05-27 DIAGNOSIS — J30.9 ALLERGIC RHINITIS, UNSPECIFIED SEASONALITY, UNSPECIFIED TRIGGER: ICD-10-CM

## 2020-05-27 DIAGNOSIS — K59.04 CHRONIC IDIOPATHIC CONSTIPATION: ICD-10-CM

## 2020-05-27 DIAGNOSIS — R73.03 PREDIABETES: ICD-10-CM

## 2020-05-27 PROCEDURE — 99214 OFFICE O/P EST MOD 30 MIN: CPT | Performed by: INTERNAL MEDICINE

## 2020-05-27 RX ORDER — FAMOTIDINE 20 MG/1
20 TABLET, FILM COATED ORAL 2 TIMES DAILY
Qty: 60 TABLET | Refills: 0 | Status: SHIPPED | OUTPATIENT
Start: 2020-05-27 | End: 2020-05-28 | Stop reason: SDUPTHER

## 2020-05-27 RX ORDER — DOCUSATE SODIUM 100 MG/1
100 CAPSULE, LIQUID FILLED ORAL AS NEEDED
COMMUNITY
End: 2021-08-23

## 2020-05-27 RX ORDER — POLYETHYLENE GLYCOL 3350 17 G/17G
17 POWDER, FOR SOLUTION ORAL DAILY
COMMUNITY
End: 2020-12-01

## 2020-05-27 NOTE — ASSESSMENT & PLAN NOTE
Check A1c today.  Lifestyle recommendations reviewed.  
Chronic.  Not well controlled on Colace alone.  Discussed another trial of MiraLAX.  If 1 capful daily caused increased gassiness, try one quarter or one half cap daily and titrate for goal of 1 soft formed stool daily.  If this still causes increased intestinal gas, to let us know.  We can explore alternatives like senna (not ideal as it caused diarrhea per patient) versus lactulose.  Needs to let us know if any blood in stools or other concerns.  
Stable.  Cardiology follow-up as scheduled.  Continue beta-blocker, ACE inhibitor, aspirin, statin.  
Stable.  Continue Crestor.  FLP when patient returns.  Cardiology follow-up as scheduled.  
Stable.  Continue Toprol-XL and lisinopril.  CMP when patient returns for labs.  
Suboptimally controlled possibly due to taking medication daily instead of twice daily as intended.  Rx for Pepcid 20 mg twice daily sent x1 month supply.  Patient to call me in 3 weeks to let me know how she is doing.  If symptoms are still suboptimally controlled may consider step up to PPI.  May also need EGD in the future if we cannot control symptoms or if there is new symptoms like pain, vomiting, blood in stools etc.  
Suspect her ear symptoms are from poorly controlled allergies.  In addition to daily oral nonsedating antihistamine like Claritin or Zyrtec, she will resume Flonase.  If symptoms are still suboptimally controlled to let me know.  
no anesthesia administered
1% lidocaine
1% lidocaine

## 2020-05-28 ENCOUNTER — PATIENT MESSAGE (OUTPATIENT)
Dept: INTERNAL MEDICINE | Facility: CLINIC | Age: 70
End: 2020-05-28

## 2020-05-28 LAB
ALBUMIN SERPL-MCNC: 4 G/DL (ref 3.5–5.2)
ALBUMIN/GLOB SERPL: 1.5 G/DL
ALP SERPL-CCNC: 40 U/L (ref 39–117)
ALT SERPL W P-5'-P-CCNC: 20 U/L (ref 1–33)
ANION GAP SERPL CALCULATED.3IONS-SCNC: 8.4 MMOL/L (ref 5–15)
AST SERPL-CCNC: 15 U/L (ref 1–32)
BILIRUB SERPL-MCNC: 0.4 MG/DL (ref 0.2–1.2)
BUN BLD-MCNC: 14 MG/DL (ref 8–23)
BUN/CREAT SERPL: 16.1 (ref 7–25)
CALCIUM SPEC-SCNC: 8.8 MG/DL (ref 8.6–10.5)
CHLORIDE SERPL-SCNC: 106 MMOL/L (ref 98–107)
CHOLEST SERPL-MCNC: 149 MG/DL (ref 0–200)
CO2 SERPL-SCNC: 24.6 MMOL/L (ref 22–29)
CREAT BLD-MCNC: 0.87 MG/DL (ref 0.57–1)
GFR SERPL CREATININE-BSD FRML MDRD: 65 ML/MIN/1.73
GLOBULIN UR ELPH-MCNC: 2.6 GM/DL
GLUCOSE BLD-MCNC: 96 MG/DL (ref 65–99)
HBA1C MFR BLD: 6.4 % (ref 4.8–5.6)
HDLC SERPL-MCNC: 54 MG/DL (ref 40–60)
LDLC SERPL CALC-MCNC: 61 MG/DL (ref 0–100)
LDLC/HDLC SERPL: 1.14 {RATIO}
POTASSIUM BLD-SCNC: 4.7 MMOL/L (ref 3.5–5.2)
PROT SERPL-MCNC: 6.6 G/DL (ref 6–8.5)
SODIUM BLD-SCNC: 139 MMOL/L (ref 136–145)
TRIGL SERPL-MCNC: 168 MG/DL (ref 0–150)
VLDLC SERPL-MCNC: 33.6 MG/DL (ref 5–40)

## 2020-05-28 PROCEDURE — 83036 HEMOGLOBIN GLYCOSYLATED A1C: CPT | Performed by: INTERNAL MEDICINE

## 2020-05-28 PROCEDURE — 80053 COMPREHEN METABOLIC PANEL: CPT | Performed by: INTERNAL MEDICINE

## 2020-05-28 PROCEDURE — 80061 LIPID PANEL: CPT | Performed by: INTERNAL MEDICINE

## 2020-05-28 RX ORDER — FAMOTIDINE 20 MG/1
20 TABLET, FILM COATED ORAL 2 TIMES DAILY
Qty: 180 TABLET | Refills: 1 | Status: SHIPPED | OUTPATIENT
Start: 2020-05-28 | End: 2020-08-26

## 2020-05-28 RX ORDER — LORATADINE 10 MG/1
10 TABLET ORAL DAILY
COMMUNITY
End: 2020-12-01

## 2020-05-28 RX ORDER — FLUTICASONE PROPIONATE 50 MCG
2 SPRAY, SUSPENSION (ML) NASAL DAILY
COMMUNITY

## 2020-05-28 NOTE — TELEPHONE ENCOUNTER
From: Patricia F Bancroft  To: Lizett Stratton MD  Sent: 5/28/2020 10:39 AM EDT  Subject: Visit Follow-Up Question    Thank you for your follow up note about yesterday’s visit. In the after-visit report, cetirizine is off my meds list, but loratadine isn’t added? I’m also adding Flonase, per our discussion. Should that show in the report?  Also, I’ve learned that [facility] will mail me a 90-day supply of famotidine 20mg (180 tablets) for $3.00. I’ve been taking this med for the last few years and it helps with my heartburn. I just haven’t been taking the two doses per day that I should’ve been. [facility] hasn’t yet supplied me with the Rx you sent to them for a 30-day supply (costs $11.00). Is it possible to cancel the [facility] and switch to [facility] mail order? (I’m sure the meds will be used.) I would greatly appreciate it! Thanks for your help!

## 2020-06-18 ENCOUNTER — PATIENT MESSAGE (OUTPATIENT)
Dept: INTERNAL MEDICINE | Facility: CLINIC | Age: 70
End: 2020-06-18

## 2020-06-18 NOTE — TELEPHONE ENCOUNTER
From: Patricia F Bancroft  To: Lizett Stratton MD  Sent: 6/18/2020 9:34 AM EDT  Subject: Non-Urgent Medical Question    As you asked when I saw you on May 27, I’m contacting you to let you know that the Famotidine you’ve prescribed for me is working very well (for some reason, it seems to work better than the OTC products). Thank you again for helping me with that.  I do have one question about this med, though. How far apart should I space my twice daily dose? Is 10 hours too close together? It’d be easier for me than 12 hours, since I don’t get up much before 8:00 most mornings.  Thanks in advance for your advice! I look forward to seeing you again in a few months!

## 2020-07-02 ENCOUNTER — TRANSCRIBE ORDERS (OUTPATIENT)
Dept: ADMINISTRATIVE | Facility: HOSPITAL | Age: 70
End: 2020-07-02

## 2020-07-02 DIAGNOSIS — Z12.31 VISIT FOR SCREENING MAMMOGRAM: Primary | ICD-10-CM

## 2020-08-19 ENCOUNTER — OFFICE VISIT (OUTPATIENT)
Dept: CARDIOLOGY | Facility: CLINIC | Age: 70
End: 2020-08-19

## 2020-08-19 VITALS
DIASTOLIC BLOOD PRESSURE: 70 MMHG | HEIGHT: 67 IN | OXYGEN SATURATION: 96 % | SYSTOLIC BLOOD PRESSURE: 150 MMHG | BODY MASS INDEX: 28.25 KG/M2 | WEIGHT: 180 LBS | HEART RATE: 84 BPM

## 2020-08-19 DIAGNOSIS — I10 ESSENTIAL HYPERTENSION: ICD-10-CM

## 2020-08-19 DIAGNOSIS — I25.10 CORONARY ARTERY DISEASE INVOLVING NATIVE CORONARY ARTERY OF NATIVE HEART WITHOUT ANGINA PECTORIS: Primary | ICD-10-CM

## 2020-08-19 DIAGNOSIS — E78.2 MIXED HYPERLIPIDEMIA: ICD-10-CM

## 2020-08-19 PROCEDURE — 99214 OFFICE O/P EST MOD 30 MIN: CPT | Performed by: INTERNAL MEDICINE

## 2020-08-19 NOTE — PROGRESS NOTES
[facility] – [place], [place]  Office visit  Patricia F Bancroft  1950  [phone]    VISIT DATE:  8/19/2020      PCP: Nishant Dejesus MD (Inactive)  No address on file    CC:  Chief Complaint   Patient presents with   • Coronary Artery Disease   • Dizziness       PROBLEM LIST:  1. Coronary artery disease:  a. Status post drug-eluting stent to right coronary artery, May 2014, with no significant residual left system disease (EF 65%).  2. Essential Hypertension.  3. Dyslipidemia.  4. Seasonal allergies/allergic rhinitis.  5. History of remote tobacco use.  6. Hyperglycemia.  7. Surgical history:  a. Benign breast biopsy x2.  b. Hysterectomy with ovary removal.      ASSESSMENT:   Diagnosis Plan   1. Coronary artery disease involving native coronary artery of native heart without angina pectoris     2. Essential hypertension     3. Mixed hyperlipidemia         PLAN:  Coronary disease: Stable asymptomatic.  Continue aspirin 162 mg by mouth daily, high intensity statin therapy and afterload reduction with combination of lisinopril and metoprolol.    Hypertension: Goal less than 130/80.  Currently controlled.  Continue current medical therapy.    Hyperlipidemia: Goal LDL <70, continue rosuvastatin 20 mg p.o. daily.  Currently well controlled.    Cardiac murmur: Consistent with aortic sclerosis.  Does report recurrent episodes of strep pharyngitis as a child but was never diagnosed with rheumatic fever.  We will continue to trend murmur clinically and evaluate with transthoracic echo if murmur increases in severity.  Also potentially had some mild mitral valve prolapse on a remote echo.  May consider repeat echocardiographic assessment over the next 1 to 2 years.    Subjective  Here for routine follow-up.  Denies dyspnea on exertion or palpitations.  Compliant with medical therapy.  Blood pressures running less than 130/80 mmHg.  Denies myalgias on statin therapy.  Denies chest pain.  Has not been able  "to exercise as regularly during COVID-19.  Diet has suffered as well.  This correlated with an upward trend in hemoglobin A1c and triglycerides and baseline weight.  LDL significantly improved after switching from atorvastatin to rosuvastatin.    PHYSICAL EXAMINATION:  Vitals:    08/19/20 0923   BP: 150/70   BP Location: Right arm   Patient Position: Sitting   Pulse: 84   SpO2: 96%   Weight: 81.6 kg (180 lb)   Height: 170.2 cm (67\")     General Appearance:    Alert, cooperative, no distress, appears stated age   Head:    Normocephalic, without obvious abnormality, atraumatic   Eyes:    conjunctiva/corneas clear   Nose:   Nares normal, septum midline, mucosa normal, no drainage   Throat:   Lips, teeth and gums normal   Neck:   Supple, symmetrical, trachea midline, no carotid    bruit or JVD   Lungs:     Clear to auscultation bilaterally, respirations unlabored   Chest Wall:    No tenderness or deformity    Heart:    Regular rate and rhythm, S1 and S2 normal, 2/6 early peaking systolic murmur right upper sternal border, rub   or gallop, normal carotid impulse bilaterally without bruit.   Abdomen:     Soft, non-tender   Extremities:   Extremities normal, atraumatic, no cyanosis or edema   Pulses:   2+ and symmetric all extremities   Skin:   Skin color, texture, turgor normal, no rashes or lesions       Diagnostic Data:  Procedures  Lab Results   Component Value Date    CHLPL 172 04/27/2016    TRIG 168 (H) 05/28/2020    HDL 54 05/28/2020     Lab Results   Component Value Date    GLUCOSE 96 05/28/2020    BUN 14 05/28/2020    CREATININE 0.87 05/28/2020     05/28/2020    K 4.7 05/28/2020     05/28/2020    CO2 24.6 05/28/2020     Lab Results   Component Value Date    HGBA1C 6.40 (H) 05/28/2020     Lab Results   Component Value Date    WBC 6.58 11/22/2019    HGB 14.4 11/22/2019    HCT 43.6 11/22/2019     11/22/2019       Allergies  Allergies   Allergen Reactions   • Dust Mite Extract Cough, Itching and " Other (See Comments)   • Ciprofloxacin Rash   • Ibuprofen Rash       Current Medications    Current Outpatient Medications:   •  aspirin 162 MG EC tablet, Take 162 mg by mouth daily., Disp: , Rfl:   •  docusate sodium (COLACE) 100 MG capsule, Take 100 mg by mouth Daily. 3 tablets once daily, Disp: , Rfl:   •  estradiol (CLIMARA) 0.025 MG/24HR patch, Place 1 patch on the skin as directed by provider 1 (One) Time Per Week., Disp: 12 patch, Rfl: 3  •  famotidine (Pepcid) 20 MG tablet, Take 1 tablet by mouth 2 (Two) Times a Day for 90 days. (Patient taking differently: Take 20 mg by mouth 2 (Two) Times a Day. 0.5 tablet twice daily), Disp: 180 tablet, Rfl: 1  •  fluticasone (FLONASE) 50 MCG/ACT nasal spray, 2 sprays into the nostril(s) as directed by provider Daily., Disp: , Rfl:   •  lisinopril (PRINIVIL,ZESTRIL) 10 MG tablet, Take 1 tablet by mouth Daily., Disp: 90 tablet, Rfl: 1  •  loratadine (CLARITIN) 10 MG tablet, Take 10 mg by mouth Daily., Disp: , Rfl:   •  metoprolol succinate XL (TOPROL-XL) 25 MG 24 hr tablet, Take 1 tablet by mouth Daily., Disp: 90 tablet, Rfl: 3  •  polyethylene glycol (MIRALAX) packet, Take 17 g by mouth Daily., Disp: , Rfl:   •  rosuvastatin (CRESTOR) 40 MG tablet, Take 1 tablet by mouth Daily., Disp: 90 tablet, Rfl: 3          ROS  Review of Systems   Cardiovascular: Negative for chest pain, dyspnea on exertion and irregular heartbeat.   Respiratory: Negative for cough.          SOCIAL HX  Social History     Socioeconomic History   • Marital status:      Spouse name: Not on file   • Number of children: Not on file   • Years of education: Not on file   • Highest education level: Not on file   Tobacco Use   • Smoking status: Former Smoker     Packs/day: 1.00     Years: 40.00     Pack years: 40.00     Types: Cigarettes     Start date: 1966     Last attempt to quit:      Years since quittin.0   • Smokeless tobacco: Never Used   Substance and Sexual Activity   • Alcohol  use: No   • Drug use: No   • Sexual activity: Yes     Partners: Male     Birth control/protection: Post-menopausal       FAMILY HX  Family History   Problem Relation Age of Onset   • Cancer Mother         Lung cancer;  Dec 1996   • Cancer Father         Primary site unknown;  1996   • Cancer Sister         Pancreatic Stage 1 diagnosed end of Dec 2017; chemo then surgery then chemo ending Oct 5 2018. Doing well so far.   • Breast cancer Neg Hx    • Ovarian cancer Neg Hx              Ghanshyam Padron III, MD, FACC

## 2020-08-22 ENCOUNTER — HOSPITAL ENCOUNTER (OUTPATIENT)
Dept: MAMMOGRAPHY | Facility: HOSPITAL | Age: 70
Discharge: HOME | End: 2020-08-22
Admitting: INTERNAL MEDICINE

## 2020-08-22 DIAGNOSIS — Z12.31 VISIT FOR SCREENING MAMMOGRAM: ICD-10-CM

## 2020-08-22 PROCEDURE — 77067 SCR MAMMO BI INCL CAD: CPT | Performed by: RADIOLOGY

## 2020-08-22 PROCEDURE — 77063 BREAST TOMOSYNTHESIS BI: CPT | Performed by: RADIOLOGY

## 2020-08-22 PROCEDURE — 77067 SCR MAMMO BI INCL CAD: CPT

## 2020-08-22 PROCEDURE — 77063 BREAST TOMOSYNTHESIS BI: CPT

## 2020-09-18 ENCOUNTER — PATIENT MESSAGE (OUTPATIENT)
Dept: INTERNAL MEDICINE | Facility: CLINIC | Age: 70
End: 2020-09-18

## 2020-09-18 NOTE — TELEPHONE ENCOUNTER
From: Patricia F Bancroft  To: Lizett Stratton MD  Sent: 9/18/2020 10:28 AM EDT  Subject: Non-Urgent Medical Question    Good morning!  Can you let me know when and where my  (Fidel, [id number]) and I can get our flu shots this year? We get the “senior” vaccine, and have gotten it in your offices for many years. I know there’s a drive-thru flu shot clinic set for Saturday, 9/26, at [facility], but I don’t know if the senior version will be available there and I don’t know if appointments are required. Also, is there a later date when the shots will be offered again? (If we get the vaccine on the 26th, will it remain effective through the end of the flu season?)    Thank you for your help! I hope you and yours are all doing well and staying safe!  Danielle

## 2020-09-26 ENCOUNTER — FLU SHOT (OUTPATIENT)
Dept: INTERNAL MEDICINE | Facility: CLINIC | Age: 70
End: 2020-09-26

## 2020-09-26 DIAGNOSIS — Z23 NEED FOR INFLUENZA VACCINATION: ICD-10-CM

## 2020-09-26 PROCEDURE — G0008 ADMIN INFLUENZA VIRUS VAC: HCPCS | Performed by: PHYSICIAN ASSISTANT

## 2020-09-26 PROCEDURE — 90694 VACC AIIV4 NO PRSRV 0.5ML IM: CPT | Performed by: PHYSICIAN ASSISTANT

## 2020-10-09 ENCOUNTER — APPOINTMENT (OUTPATIENT)
Dept: MAMMOGRAPHY | Facility: HOSPITAL | Age: 70
End: 2020-10-09

## 2020-10-28 ENCOUNTER — PATIENT MESSAGE (OUTPATIENT)
Dept: INTERNAL MEDICINE | Facility: CLINIC | Age: 70
End: 2020-10-28

## 2020-10-28 RX ORDER — LISINOPRIL 10 MG/1
10 TABLET ORAL DAILY
Qty: 90 TABLET | Refills: 1 | Status: SHIPPED | OUTPATIENT
Start: 2020-10-28 | End: 2021-03-19

## 2020-10-28 NOTE — TELEPHONE ENCOUNTER
From: Patricia F Bancroft  To: Lizett Stratton MD  Sent: 10/28/2020 4:05 PM EDT  Subject: Prescription Question    Good afternoon! Will you please fax a new 90-day prescription for my Lisinopril (10mg tab) to [facility] mail order pharmacy? No hurry... at your convenience. (If you need to talk to me first, you can call me at [phone].)  Thank you!  Danielle

## 2020-12-01 ENCOUNTER — OFFICE VISIT (OUTPATIENT)
Dept: INTERNAL MEDICINE | Facility: CLINIC | Age: 70
End: 2020-12-01

## 2020-12-01 VITALS
TEMPERATURE: 97.7 F | HEART RATE: 75 BPM | OXYGEN SATURATION: 96 % | WEIGHT: 174.2 LBS | BODY MASS INDEX: 27.34 KG/M2 | RESPIRATION RATE: 18 BRPM | HEIGHT: 67 IN | DIASTOLIC BLOOD PRESSURE: 78 MMHG | SYSTOLIC BLOOD PRESSURE: 128 MMHG

## 2020-12-01 DIAGNOSIS — K59.04 CHRONIC IDIOPATHIC CONSTIPATION: ICD-10-CM

## 2020-12-01 DIAGNOSIS — I25.10 CORONARY ARTERY DISEASE INVOLVING NATIVE CORONARY ARTERY OF NATIVE HEART WITHOUT ANGINA PECTORIS: ICD-10-CM

## 2020-12-01 DIAGNOSIS — K21.9 GASTROESOPHAGEAL REFLUX DISEASE, UNSPECIFIED WHETHER ESOPHAGITIS PRESENT: ICD-10-CM

## 2020-12-01 DIAGNOSIS — Z87.891 HISTORY OF TOBACCO USE: ICD-10-CM

## 2020-12-01 DIAGNOSIS — E78.2 MIXED HYPERLIPIDEMIA: ICD-10-CM

## 2020-12-01 DIAGNOSIS — I10 ESSENTIAL HYPERTENSION: ICD-10-CM

## 2020-12-01 DIAGNOSIS — Z11.59 ENCOUNTER FOR HEPATITIS C SCREENING TEST FOR LOW RISK PATIENT: ICD-10-CM

## 2020-12-01 DIAGNOSIS — J30.9 ALLERGIC RHINITIS, UNSPECIFIED SEASONALITY, UNSPECIFIED TRIGGER: ICD-10-CM

## 2020-12-01 DIAGNOSIS — Z00.00 MEDICARE ANNUAL WELLNESS VISIT, SUBSEQUENT: Primary | ICD-10-CM

## 2020-12-01 DIAGNOSIS — R73.03 PREDIABETES: ICD-10-CM

## 2020-12-01 DIAGNOSIS — R14.0 ABDOMINAL BLOATING: ICD-10-CM

## 2020-12-01 LAB
ALBUMIN SERPL-MCNC: 4.7 G/DL (ref 3.5–5.2)
ALBUMIN/GLOB SERPL: 1.5 G/DL
ALP SERPL-CCNC: 56 U/L (ref 39–117)
ALT SERPL W P-5'-P-CCNC: 22 U/L (ref 1–33)
ANION GAP SERPL CALCULATED.3IONS-SCNC: 10 MMOL/L (ref 5–15)
AST SERPL-CCNC: 23 U/L (ref 1–32)
BILIRUB SERPL-MCNC: 0.4 MG/DL (ref 0–1.2)
BUN SERPL-MCNC: 13 MG/DL (ref 8–23)
BUN/CREAT SERPL: 17.1 (ref 7–25)
CALCIUM SPEC-SCNC: 9.5 MG/DL (ref 8.6–10.5)
CHLORIDE SERPL-SCNC: 105 MMOL/L (ref 98–107)
CHOLEST SERPL-MCNC: 158 MG/DL (ref 0–200)
CO2 SERPL-SCNC: 25 MMOL/L (ref 22–29)
CREAT SERPL-MCNC: 0.76 MG/DL (ref 0.57–1)
DEPRECATED RDW RBC AUTO: 42.9 FL (ref 37–54)
ERYTHROCYTE [DISTWIDTH] IN BLOOD BY AUTOMATED COUNT: 12.1 % (ref 12.3–15.4)
GFR SERPL CREATININE-BSD FRML MDRD: 75 ML/MIN/1.73
GLOBULIN UR ELPH-MCNC: 3.1 GM/DL
GLUCOSE SERPL-MCNC: 89 MG/DL (ref 65–99)
HBA1C MFR BLD: 6.58 % (ref 4.8–5.6)
HCT VFR BLD AUTO: 45.2 % (ref 34–46.6)
HCV AB SER DONR QL: NORMAL
HDLC SERPL-MCNC: 62 MG/DL (ref 40–60)
HGB BLD-MCNC: 14.9 G/DL (ref 12–15.9)
LDLC SERPL CALC-MCNC: 79 MG/DL (ref 0–100)
LDLC/HDLC SERPL: 1.25 {RATIO}
LIPASE SERPL-CCNC: 33 U/L (ref 13–60)
MCH RBC QN AUTO: 31.2 PG (ref 26.6–33)
MCHC RBC AUTO-ENTMCNC: 33 G/DL (ref 31.5–35.7)
MCV RBC AUTO: 94.8 FL (ref 79–97)
PLATELET # BLD AUTO: 339 10*3/MM3 (ref 140–450)
PMV BLD AUTO: 11.3 FL (ref 6–12)
POTASSIUM SERPL-SCNC: 4.7 MMOL/L (ref 3.5–5.2)
PROT SERPL-MCNC: 7.8 G/DL (ref 6–8.5)
RBC # BLD AUTO: 4.77 10*6/MM3 (ref 3.77–5.28)
SODIUM SERPL-SCNC: 140 MMOL/L (ref 136–145)
TRIGL SERPL-MCNC: 94 MG/DL (ref 0–150)
TSH SERPL DL<=0.05 MIU/L-ACNC: 1.16 UIU/ML (ref 0.27–4.2)
VLDLC SERPL-MCNC: 17 MG/DL (ref 5–40)
WBC # BLD AUTO: 5.67 10*3/MM3 (ref 3.4–10.8)

## 2020-12-01 PROCEDURE — 83036 HEMOGLOBIN GLYCOSYLATED A1C: CPT | Performed by: INTERNAL MEDICINE

## 2020-12-01 PROCEDURE — 82784 ASSAY IGA/IGD/IGG/IGM EACH: CPT | Performed by: INTERNAL MEDICINE

## 2020-12-01 PROCEDURE — 83516 IMMUNOASSAY NONANTIBODY: CPT | Performed by: INTERNAL MEDICINE

## 2020-12-01 PROCEDURE — 86803 HEPATITIS C AB TEST: CPT | Performed by: INTERNAL MEDICINE

## 2020-12-01 PROCEDURE — 36415 COLL VENOUS BLD VENIPUNCTURE: CPT | Performed by: INTERNAL MEDICINE

## 2020-12-01 PROCEDURE — 84443 ASSAY THYROID STIM HORMONE: CPT | Performed by: INTERNAL MEDICINE

## 2020-12-01 PROCEDURE — 99213 OFFICE O/P EST LOW 20 MIN: CPT | Performed by: INTERNAL MEDICINE

## 2020-12-01 PROCEDURE — 83690 ASSAY OF LIPASE: CPT | Performed by: INTERNAL MEDICINE

## 2020-12-01 PROCEDURE — 80053 COMPREHEN METABOLIC PANEL: CPT | Performed by: INTERNAL MEDICINE

## 2020-12-01 PROCEDURE — 86255 FLUORESCENT ANTIBODY SCREEN: CPT | Performed by: INTERNAL MEDICINE

## 2020-12-01 PROCEDURE — G0439 PPPS, SUBSEQ VISIT: HCPCS | Performed by: INTERNAL MEDICINE

## 2020-12-01 PROCEDURE — 85027 COMPLETE CBC AUTOMATED: CPT | Performed by: INTERNAL MEDICINE

## 2020-12-01 PROCEDURE — 80061 LIPID PANEL: CPT | Performed by: INTERNAL MEDICINE

## 2020-12-01 RX ORDER — CETIRIZINE HYDROCHLORIDE 10 MG/1
10 TABLET ORAL DAILY
COMMUNITY

## 2020-12-01 RX ORDER — LACTULOSE 10 G/15ML
20 SOLUTION ORAL 2 TIMES DAILY PRN
Qty: 150 ML | Refills: 0 | Status: SHIPPED | OUTPATIENT
Start: 2020-12-01 | End: 2020-12-07 | Stop reason: SDUPTHER

## 2020-12-01 NOTE — ASSESSMENT & PLAN NOTE
Stable.  Cardiology follow-up as scheduled.  Continue beta-blocker, ACE inhibitor, aspirin, statin.

## 2020-12-01 NOTE — ASSESSMENT & PLAN NOTE
Not well controlled.  Has tried and failed MiraLAX, Colace, senna.  Using milk of mag but recently ineffective.  No red flag signs of obstruction like lack of flatus, lack of bowel sounds, intractable vomiting.  Discussed trial of lactulose and if that fails may consider step up therapy to Linzess or formulary equivalent although patient is concerned about cost.  Given refractory GERD, abdominal bloating etc. we will also obtain imaging (CT abdomen ordered, per patient she requested [facility] to minimize out-of-pocket cost to herself).  We will also screen TSH, celiac panel, lipase.  Reviewed reasons to present for immediate/emergency care such as lack of flatus, abdominal distention, lack of bowel sounds, vomiting, sudden/severe/new focal abdominal pain etc.

## 2020-12-01 NOTE — ASSESSMENT & PLAN NOTE
Suboptimally controlled.  Pepcid made constipation worse.  She was not tolerant of Prilosec.  Certainly GERD may be worsened by her constipation as above which is being addressed separately.  Discussed potential trial of a separate PPI such as Nexium.  Patient declines for now.  She wants to address her constipation first.  May need endoscopy for refractory symptoms but patient is very hesitant to consider this.

## 2020-12-01 NOTE — PATIENT INSTRUCTIONS
Medicare Wellness  Personal Prevention Plan of Service     Date of Office Visit:  2020  Encounter Provider:  Lizett Stratton MD  Place of Service:  [facility]  Patient Name: Patricia F Bancroft  :  1950    As part of the Medicare Wellness portion of your visit today, we are providing you with this personalized preventive plan of services (PPPS). This plan is based upon recommendations of the [place] Preventive Services Task Force (USPSTF) and the Advisory Committee on Immunization Practices (ACIP).    This lists the preventive care services that should be considered, and provides dates of when you are due. Items listed as completed are up-to-date and do not require any further intervention.    Health Maintenance   Topic Date Due   • ZOSTER VACCINE (2 of 2) 2014   • HEPATITIS C SCREENING  2016   • ANNUAL WELLNESS VISIT  2020   • LUNG CANCER SCREENING  2020   • PAP SMEAR  2021   • LIPID PANEL  2021   • TDAP/TD VACCINES (2 - Td) 2022   • MAMMOGRAM  2022   • COLONOSCOPY  2027   • INFLUENZA VACCINE  Completed   • Pneumococcal Vaccine 65+  Completed       Orders Placed This Encounter   Procedures   • Lipid Panel   • Comprehensive Metabolic Panel   • CBC (No Diff)   • Hepatitis C Antibody   • Hemoglobin A1c       No follow-ups on file.      Obesity, Adult  Obesity is having too much body fat. Being obese means that your weight is more than what is healthy for you.  BMI is a number that explains how much body fat you have. If you have a BMI of 30 or more, you are obese. Obesity is often caused by eating or drinking more calories than your body uses. Changing your lifestyle can help you lose weight.  Obesity can cause serious health problems, such as:  · Stroke.  · Coronary artery disease (CAD).  · Type 2 diabetes.  · Some types of cancer, including cancers of the colon, breast, uterus, and  gallbladder.  · Osteoarthritis.  · High blood pressure (hypertension).  · High cholesterol.  · Sleep apnea.  · Gallbladder stones.  · Infertility problems.  What are the causes?  · Eating meals each day that are high in calories, sugar, and fat.  · Being born with genes that may make you more likely to become obese.  · Having a medical condition that causes obesity.  · Taking certain medicines.  · Sitting a lot (having a sedentary lifestyle).  · Not getting enough sleep.  · Drinking a lot of drinks that have sugar in them.  What increases the risk?  · Having a family history of obesity.  · Being an  woman.  · Being a  man.  · Living in an area with limited access to:  ? [place], recreation centers, or sidewalks.  ? Healthy food choices, such as grocery stores and farmers' markets.  What are the signs or symptoms?  The main sign is having too much body fat.  How is this treated?  · Treatment for this condition often includes changing your lifestyle. Treatment may include:  ? Changing your diet. This may include making a healthy meal plan.  ? Exercise. This may include activity that causes your heart to beat faster (aerobic exercise) and strength training. Work with your doctor to design a program that works for you.  ? Medicine to help you lose weight. This may be used if you are not able to lose 1 pound a week after 6 weeks of healthy eating and more exercise.  ? Treating conditions that cause the obesity.  ? Surgery. Options may include gastric banding and gastric bypass. This may be done if:  § Other treatments have not helped to improve your condition.  § You have a BMI of 40 or higher.  § You have life-threatening health problems related to obesity.  Follow these instructions at home:  Eating and drinking    · Follow advice from your doctor about what to eat and drink. Your doctor may tell you to:  ? Limit fast food, sweets, and processed snack foods.  ? Choose low-fat options. For example,  choose low-fat milk instead of whole milk.  ? Eat 5 or more servings of fruits or vegetables each day.  ? Eat at home more often. This gives you more control over what you eat.  ? Choose healthy foods when you eat out.  ? Learn to read food labels. This will help you learn how much food is in 1 serving.  ? Keep low-fat snacks available.  ? Avoid drinks that have a lot of sugar in them. These include soda, fruit juice, iced tea with sugar, and flavored milk.  · Drink enough water to keep your pee (urine) pale yellow.  · Do not go on fad diets.  Physical activity  · Exercise often, as told by your doctor. Most adults should get up to 150 minutes of moderate-intensity exercise every week.Ask your doctor:  ? What types of exercise are safe for you.  ? How often you should exercise.  · Warm up and stretch before being active.  · Do slow stretching after being active (cool down).  · Rest between times of being active.  Lifestyle  · Work with your doctor and a food expert (dietitian) to set a weight-loss goal that is best for you.  · Limit your screen time.  · Find ways to reward yourself that do not involve food.  · Do not drink alcohol if:  ? Your doctor tells you not to drink.  ? You are pregnant, may be pregnant, or are planning to become pregnant.  · If you drink alcohol:  ? Limit how much you use to:  § 0-1 drink a day for women.  § 0-2 drinks a day for men.  ? Be aware of how much alcohol is in your drink. In the [place] one drink equals one 12 oz bottle of beer (355 mL), one 5 oz glass of wine (148 mL), or one 1½ oz glass of hard liquor (44 mL).  General instructions  · Keep a weight-loss journal. This can help you keep track of:  ? The food that you eat.  ? How much exercise you get.  · Take over-the-counter and prescription medicines only as told by your doctor.  · Take vitamins and supplements only as told by your doctor.  · Think about joining a support group.  · Keep all follow-up visits as told by your doctor.  This is important.  Contact a doctor if:  · You cannot meet your weight loss goal after you have changed your diet and lifestyle for 6 weeks.  Get help right away if you:  · Are having trouble breathing.  · Are having thoughts of harming yourself.  Summary  · Obesity is having too much body fat.  · Being obese means that your weight is more than what is healthy for you.  · Work with your doctor to set a weight-loss goal.  · Get regular exercise as told by your doctor.  This information is not intended to replace advice given to you by your health care provider. Make sure you discuss any questions you have with your health care provider.  Document Revised: 08/22/2019 Document Reviewed: 08/22/2019  [employer] Patient Education © 2020 [employer]  Preventive Care 65 Years and Older, Female  Preventive care refers to lifestyle choices and visits with your health care provider that can promote health and wellness. This includes:  · A yearly physical exam. This is also called an annual well check.  · Regular dental and eye exams.  · Immunizations.  · Screening for certain conditions.  · Healthy lifestyle choices, such as diet and exercise.  What can I expect for my preventive care visit?  Physical exam  Your health care provider will check:  · Height and weight. These may be used to calculate body mass index (BMI), which is a measurement that tells if you are at a healthy weight.  · Heart rate and blood pressure.  · Your skin for abnormal spots.  Counseling  Your health care provider may ask you questions about:  · Alcohol, tobacco, and drug use.  · Emotional well-being.  · Home and relationship well-being.  · Sexual activity.  · Eating habits.  · History of falls.  · Memory and ability to understand (cognition).  · Work and work environment.  · Pregnancy and menstrual history.  What immunizations do I need?    Influenza (flu) vaccine  · This is recommended every year.  Tetanus, diphtheria, and pertussis (Tdap)  vaccine  · You may need a Td booster every 10 years.  Varicella (chickenpox) vaccine  · You may need this vaccine if you have not already been vaccinated.  Zoster (shingles) vaccine  · You may need this after age 60.  Pneumococcal conjugate (PCV13) vaccine  · One dose is recommended after age 65.  Pneumococcal polysaccharide (PPSV23) vaccine  · One dose is recommended after age 65.  Measles, mumps, and rubella (MMR) vaccine  · You may need at least one dose of MMR if you were born in 1957 or later. You may also need a second dose.  Meningococcal conjugate (MenACWY) vaccine  · You may need this if you have certain conditions.  Hepatitis A vaccine  · You may need this if you have certain conditions or if you travel or work in places where you may be exposed to hepatitis A.  Hepatitis B vaccine  · You may need this if you have certain conditions or if you travel or work in places where you may be exposed to hepatitis B.  Haemophilus influenzae type b (Hib) vaccine  · You may need this if you have certain conditions.  You may receive vaccines as individual doses or as more than one vaccine together in one shot (combination vaccines). Talk with your health care provider about the risks and benefits of combination vaccines.  What tests do I need?  Blood tests  · Lipid and cholesterol levels. These may be checked every 5 years, or more frequently depending on your overall health.  · Hepatitis C test.  · Hepatitis B test.  Screening  · Lung cancer screening. You may have this screening every year starting at age 55 if you have a 30-pack-year history of smoking and currently smoke or have quit within the past 15 years.  · Colorectal cancer screening. All adults should have this screening starting at age 50 and continuing until age 75. Your health care provider may recommend screening at age 45 if you are at increased risk. You will have tests every 1-10 years, depending on your results and the type of screening  test.  · Diabetes screening. This is done by checking your blood sugar (glucose) after you have not eaten for a while (fasting). You may have this done every 1-3 years.  · Mammogram. This may be done every 1-2 years. Talk with your health care provider about how often you should have regular mammograms.  · BRCA-related cancer screening. This may be done if you have a family history of breast, ovarian, tubal, or peritoneal cancers.  Other tests  · Sexually transmitted disease (STD) testing.  · Bone density scan. This is done to screen for osteoporosis. You may have this done starting at age 65.  Follow these instructions at home:  Eating and drinking  · Eat a diet that includes fresh fruits and vegetables, whole grains, lean protein, and low-fat dairy products. Limit your intake of foods with high amounts of sugar, saturated fats, and salt.  · Take vitamin and mineral supplements as recommended by your health care provider.  · Do not drink alcohol if your health care provider tells you not to drink.  · If you drink alcohol:  ? Limit how much you have to 0-1 drink a day.  ? Be aware of how much alcohol is in your drink. In the [place] one drink equals one 12 oz bottle of beer (355 mL), one 5 oz glass of wine (148 mL), or one 1½ oz glass of hard liquor (44 mL).  Lifestyle  · Take daily care of your teeth and gums.  · Stay active. Exercise for at least 30 minutes on 5 or more days each week.  · Do not use any products that contain nicotine or tobacco, such as cigarettes, e-cigarettes, and chewing tobacco. If you need help quitting, ask your health care provider.  · If you are sexually active, practice safe sex. Use a condom or other form of protection in order to prevent STIs (sexually transmitted infections).  · Talk with your health care provider about taking a low-dose aspirin or statin.  What's next?  · Go to your health care provider once a year for a well check visit.  · Ask your health care provider how often you  should have your eyes and teeth checked.  · Stay up to date on all vaccines.  This information is not intended to replace advice given to you by your health care provider. Make sure you discuss any questions you have with your health care provider.  Document Revised: 12/12/2019 Document Reviewed: 12/12/2019  [employer] Patient Education © 2020 [employer]

## 2020-12-02 LAB
ENDOMYSIUM IGA SER QL: NEGATIVE
GLIADIN PEPTIDE IGA SER-ACNC: 5 UNITS (ref 0–19)
GLIADIN PEPTIDE IGG SER-ACNC: 2 UNITS (ref 0–19)
IGA SERPL-MCNC: 157 MG/DL (ref 87–352)
TTG IGA SER-ACNC: <2 U/ML (ref 0–3)
TTG IGG SER-ACNC: 2 U/ML (ref 0–5)

## 2020-12-03 ENCOUNTER — LAB (OUTPATIENT)
Dept: INTERNAL MEDICINE | Facility: CLINIC | Age: 70
End: 2020-12-03

## 2020-12-03 ENCOUNTER — PATIENT MESSAGE (OUTPATIENT)
Dept: INTERNAL MEDICINE | Facility: CLINIC | Age: 70
End: 2020-12-03

## 2020-12-03 DIAGNOSIS — R14.0 ABDOMINAL BLOATING: ICD-10-CM

## 2020-12-03 PROCEDURE — 87338 HPYLORI STOOL AG IA: CPT | Performed by: INTERNAL MEDICINE

## 2020-12-04 NOTE — TELEPHONE ENCOUNTER
From: Patricia F Bancroft  To: Lizett Stratton MD  Sent: 12/3/2020 3:31 PM EST  Subject: Test Results Question    Thank you for the prompt test results! Looks like my A1c is the only problem shown. I’m unhappy with myself for overdoing Halloween candy, Thanksgiving feasting, and “boredom eating” during this pandemic. Am I too far gone to try again to control my sugars with diet and exercise? I’d prefer to avoid taking medication for it, if I can.   FYI, lactulose has worked well. I took first dose yesterday morning, with some relief later in the day. Took second dose this a.m. & shortly afterward had a complete bowel movement. I have 3 doses left; do I take 1/day whether I’m constipated or not? Please advise re: continuing use. (Would need new Rx to continue taking.).   Have scan referrals been faxed yet? Haven’t heard...   THANK YOU!

## 2020-12-07 RX ORDER — LACTULOSE 10 G/15ML
20 SOLUTION ORAL 2 TIMES DAILY PRN
Qty: 946 ML | Refills: 3 | Status: SHIPPED | OUTPATIENT
Start: 2020-12-07 | End: 2021-04-26

## 2020-12-08 LAB — H PYLORI AG STL QL IA: NEGATIVE

## 2020-12-10 ENCOUNTER — HOSPITAL ENCOUNTER (OUTPATIENT)
Dept: CT IMAGING | Facility: HOSPITAL | Age: 70
Discharge: HOME | End: 2020-12-10
Admitting: INTERNAL MEDICINE

## 2020-12-10 DIAGNOSIS — Z87.891 HISTORY OF TOBACCO USE: ICD-10-CM

## 2020-12-10 PROCEDURE — G0297 LDCT FOR LUNG CA SCREEN: HCPCS

## 2020-12-11 ENCOUNTER — PATIENT MESSAGE (OUTPATIENT)
Dept: INTERNAL MEDICINE | Facility: CLINIC | Age: 70
End: 2020-12-11

## 2020-12-11 NOTE — TELEPHONE ENCOUNTER
From: Patricia F Bancroft  Sent: 12/11/2020 9:39 AM EST  To: Yaneth Silva [facility] Clinical Pool  Subject: RE:CT scan results    Thank you for letting me know the results so quickly! With your approval, I’d like to give my body a few weeks to adapt to some of the changes I’ve been making to my diet, to the addition of lactulose when I need it (instead of colace and/or milk of magnesia), and to the reduction in the usage of famotadine and Tums tablets. Things seem to be improving, and I’d like to give it a chance. If you believe I should not wait, please let me know.  As to the gallbladder issues and hernias shown by the scan, do I need to worry about any of them at this point? It doesn’t sound too worrying, but I’m not the doctor!!  As always, I appreciate your help — thank you very much!

## 2020-12-18 ENCOUNTER — TELEPHONE (OUTPATIENT)
Dept: INTERNAL MEDICINE | Facility: CLINIC | Age: 70
End: 2020-12-18

## 2020-12-18 NOTE — TELEPHONE ENCOUNTER
Edith from microbiology called patient is POSITIVE FOR C.DIFF.  Please advise. Patient brought stool in today

## 2021-01-04 DIAGNOSIS — Z78.0 POST-MENOPAUSAL: Primary | ICD-10-CM

## 2021-01-04 NOTE — TELEPHONE ENCOUNTER
LOV:12/01/2020  NOV:06/01/2021  Labs:12/03/2020     Return in about 6 months (around 6/1/2021) for Follow-up, As needed if no improvement or new symptoms.

## 2021-02-13 ENCOUNTER — TELEMEDICINE (OUTPATIENT)
Dept: FAMILY MEDICINE CLINIC | Facility: TELEHEALTH | Age: 71
End: 2021-02-13

## 2021-02-13 DIAGNOSIS — R39.89 SUSPECTED UTI: Primary | ICD-10-CM

## 2021-02-13 PROBLEM — H25.819 COMBINED FORM OF SENILE CATARACT: Status: ACTIVE | Noted: 2018-05-04

## 2021-02-13 PROBLEM — H43.819 POSTERIOR VITREOUS DETACHMENT: Status: ACTIVE | Noted: 2018-05-04

## 2021-02-13 PROBLEM — H52.4 PRESBYOPIA: Status: ACTIVE | Noted: 2018-05-04

## 2021-02-13 PROBLEM — H52.229 REGULAR ASTIGMATISM: Status: ACTIVE | Noted: 2018-05-04

## 2021-02-13 PROCEDURE — 99213 OFFICE O/P EST LOW 20 MIN: CPT | Performed by: NURSE PRACTITIONER

## 2021-02-13 RX ORDER — NITROFURANTOIN 25; 75 MG/1; MG/1
100 CAPSULE ORAL 2 TIMES DAILY
Qty: 10 CAPSULE | Refills: 0 | Status: SHIPPED | OUTPATIENT
Start: 2021-02-13 | End: 2021-02-18

## 2021-02-13 NOTE — PATIENT INSTRUCTIONS
Wipe front to back, stay hydrated with water to flush the kidneys, avoid caffeinated beverages.  If symptoms worsen or do not improve follow up with your PCP or visit your nearest Urgent Care Center or ER.        Urinary Tract Infection, Adult  A urinary tract infection (UTI) is an infection of any part of the urinary tract. The urinary tract includes:  · The kidneys.  · The ureters.  · The bladder.  · The urethra.  These organs make, store, and get rid of pee (urine) in the body.  What are the causes?  This is caused by germs (bacteria) in your genital area. These germs grow and cause swelling (inflammation) of your urinary tract.  What increases the risk?  You are more likely to develop this condition if:  · You have a small, thin tube (catheter) to drain pee.  · You cannot control when you pee or poop (incontinence).  · You are female, and:  ? You use these methods to prevent pregnancy:  § A medicine that kills sperm (spermicide).  § A device that blocks sperm (diaphragm).  ? You have low levels of a female hormone (estrogen).  ? You are pregnant.  · You have genes that add to your risk.  · You are sexually active.  · You take antibiotic medicines.  · You have trouble peeing because of:  ? A prostate that is bigger than normal, if you are male.  ? A blockage in the part of your body that drains pee from the bladder (urethra).  ? A kidney stone.  ? A nerve condition that affects your bladder (neurogenic bladder).  ? Not getting enough to drink.  ? Not peeing often enough.  · You have other conditions, such as:  ? Diabetes.  ? A weak disease-fighting system (immune system).  ? Sickle cell disease.  ? Gout.  ? Injury of the spine.  What are the signs or symptoms?  Symptoms of this condition include:  · Needing to pee right away (urgently).  · Peeing often.  · Peeing small amounts often.  · Pain or burning when peeing.  · Blood in the pee.  · Pee that smells bad or not like normal.  · Trouble peeing.  · Pee that is  cloudy.  · Fluid coming from the vagina, if you are female.  · Pain in the belly or lower back.  Other symptoms include:  · Throwing up (vomiting).  · No urge to eat.  · Feeling mixed up (confused).  · Being tired and grouchy (irritable).  · A fever.  · Watery poop (diarrhea).  How is this treated?  This condition may be treated with:  · Antibiotic medicine.  · Other medicines.  · Drinking enough water.  Follow these instructions at home:    Medicines  · Take over-the-counter and prescription medicines only as told by your doctor.  · If you were prescribed an antibiotic medicine, take it as told by your doctor. Do not stop taking it even if you start to feel better.  General instructions  · Make sure you:  ? Pee until your bladder is empty.  ? Do not hold pee for a long time.  ? Empty your bladder after sex.  ? Wipe from front to back after pooping if you are a female. Use each tissue one time when you wipe.  · Drink enough fluid to keep your pee pale yellow.  · Keep all follow-up visits as told by your doctor. This is important.  Contact a doctor if:  · You do not get better after 1-2 days.  · Your symptoms go away and then come back.  Get help right away if:  · You have very bad back pain.  · You have very bad pain in your lower belly.  · You have a fever.  · You are sick to your stomach (nauseous).  · You are throwing up.  Summary  · A urinary tract infection (UTI) is an infection of any part of the urinary tract.  · This condition is caused by germs in your genital area.  · There are many risk factors for a UTI. These include having a small, thin tube to drain pee and not being able to control when you pee or poop.  · Treatment includes antibiotic medicines for germs.  · Drink enough fluid to keep your pee pale yellow.  This information is not intended to replace advice given to you by your health care provider. Make sure you discuss any questions you have with your health care provider.  Document Revised:  12/05/2019 Document Reviewed: 06/27/2019  [employer] Patient Education © 2020 [employer]

## 2021-02-24 DIAGNOSIS — E78.2 MIXED HYPERLIPIDEMIA: Primary | ICD-10-CM

## 2021-02-24 NOTE — TELEPHONE ENCOUNTER
LOV:12/01/2020  NOV:06/04/2021  Labs:12/03/2020     Return in about 6 months (around 6/1/2021) for Follow-up, As needed if no improvement or new symptoms.

## 2021-02-25 RX ORDER — ROSUVASTATIN CALCIUM 40 MG/1
40 TABLET, COATED ORAL DAILY
Qty: 90 TABLET | Refills: 3 | Status: SHIPPED | OUTPATIENT
Start: 2021-02-25 | End: 2022-02-15 | Stop reason: SDUPTHER

## 2021-03-05 ENCOUNTER — OFFICE VISIT (OUTPATIENT)
Dept: INTERNAL MEDICINE | Facility: CLINIC | Age: 71
End: 2021-03-05

## 2021-03-05 VITALS
HEART RATE: 72 BPM | RESPIRATION RATE: 20 BRPM | BODY MASS INDEX: 26.47 KG/M2 | WEIGHT: 169 LBS | TEMPERATURE: 97.4 F | DIASTOLIC BLOOD PRESSURE: 70 MMHG | SYSTOLIC BLOOD PRESSURE: 124 MMHG

## 2021-03-05 DIAGNOSIS — R82.90 ABNORMAL URINALYSIS: ICD-10-CM

## 2021-03-05 DIAGNOSIS — R10.2 PELVIC PAIN: Primary | ICD-10-CM

## 2021-03-05 LAB
BILIRUB BLD-MCNC: NEGATIVE MG/DL
CLARITY, POC: ABNORMAL
COLOR UR: YELLOW
EXPIRATION DATE: ABNORMAL
GLUCOSE UR STRIP-MCNC: ABNORMAL MG/DL
KETONES UR QL: NEGATIVE
LEUKOCYTE EST, POC: NEGATIVE
Lab: ABNORMAL
NITRITE UR-MCNC: NEGATIVE MG/ML
PH UR: 5 [PH] (ref 5–8)
PROT UR STRIP-MCNC: NEGATIVE MG/DL
RBC # UR STRIP: ABNORMAL /UL
SP GR UR: 1 (ref 1–1.03)
UROBILINOGEN UR QL: NORMAL

## 2021-03-05 PROCEDURE — 87086 URINE CULTURE/COLONY COUNT: CPT | Performed by: INTERNAL MEDICINE

## 2021-03-05 PROCEDURE — 99213 OFFICE O/P EST LOW 20 MIN: CPT | Performed by: INTERNAL MEDICINE

## 2021-03-05 PROCEDURE — 81003 URINALYSIS AUTO W/O SCOPE: CPT | Performed by: INTERNAL MEDICINE

## 2021-03-05 NOTE — PROGRESS NOTES
OFFICE PROGRESS NOTE    Chief Complaint   Patient presents with   • Pelvic Pain       HPI: 70 y.o. female with history of hypertension, CAD, hyperlipidemia, aortic sclerosis, GERD, postmenopausal HRT, allergies, prediabetes, chronic constipation here for:    2 to 3 weeks ago she was having some dysuria and an episode of a few drops of blood which she thinks came from her urethra but she is not entirely sure if it was not vaginal.  She did a E-visit and was prescribed Macrobid.  She still has ongoing symptoms of sought evaluation.  She notes she has a history of hysterectomy in 2004.  She has had several months of left lower pelvic/inguinal pain, worse with lying flat or twisting motions.  She feels a pinching pain in her urethra.  She notes she has a rectocele.  She denies any pain with intercourse.  No vaginal discharge.  She wonders if the mesh from her hysterectomy became displaced.      Review of Systems   Constitutional: Negative for activity change, appetite change and fever.   HENT: Negative for congestion, sneezing and sore throat.    Eyes: Negative for discharge and visual disturbance.   Respiratory: Negative for cough and shortness of breath.    Cardiovascular: Negative for chest pain and palpitations.   Gastrointestinal: Positive for constipation (chronic). Negative for abdominal distention, abdominal pain, blood in stool, nausea and vomiting.   Endocrine: Negative for cold intolerance and heat intolerance.   Genitourinary: Positive for pelvic pain and pelvic pressure. Negative for dysuria.   Musculoskeletal: Negative for neck stiffness.   Skin: Negative for rash.   Allergic/Immunologic: Negative for environmental allergies and food allergies.   Neurological: Negative for headache.   Hematological: Negative for adenopathy.   Psychiatric/Behavioral: Negative for depressed mood.       The following portions of the patient's history were reviewed and updated as appropriate: allergies, current medications,  past family history, past medical history, past social history, past surgical history and problem list.      Physical Exam:  Vitals:    03/05/21 0830   BP: 124/70   Pulse: 72   Resp: 20   Temp: 97.4 °F (36.3 °C)   Weight: 76.7 kg (169 lb)       Physical Exam  Vitals signs reviewed.   Constitutional:       General: She is not in acute distress.     Appearance: She is not ill-appearing, toxic-appearing or diaphoretic.   HENT:      Head: Normocephalic and atraumatic.      Right Ear: External ear normal.      Left Ear: External ear normal.      Nose: Nose normal.   Eyes:      General:         Right eye: No discharge.         Left eye: No discharge.      Conjunctiva/sclera: Conjunctivae normal.   Neck:      Musculoskeletal: Normal range of motion and neck supple.   Cardiovascular:      Rate and Rhythm: Normal rate and regular rhythm.      Heart sounds: Normal heart sounds. No murmur. No friction rub. No gallop.    Pulmonary:      Effort: Pulmonary effort is normal. No respiratory distress.      Breath sounds: Normal breath sounds. No stridor. No wheezing, rhonchi or rales.   Abdominal:      General: Bowel sounds are normal. There is no distension.      Palpations: Abdomen is soft. There is no mass.      Tenderness: There is no abdominal tenderness. There is no guarding or rebound.   Genitourinary:     Comments: Evidence of rectocele and probable mild cystocele on exam today.  No discomfort with speculum exam.  No bleeding, discharge.  Well estrogenized tissues noted.  Skin:     General: Skin is warm and dry.      Capillary Refill: Capillary refill takes less than 2 seconds.   Neurological:      General: No focal deficit present.      Mental Status: She is alert and oriented to person, place, and time.   Psychiatric:         Mood and Affect: Mood normal.          Labs:  Brief Urine Lab Results  (Last result in the past 365 days)      Color   Clarity   Blood   Leuk Est   Nitrite   Protein   CREAT   Urine HCG         "03/05/21 0909 Yellow Hazy 50 Anoop/ul Negative Negative Negative               Assesment and Plan: 70 y.o. female here for:  Diagnoses and all orders for this visit:    1. Pelvic pain (Primary)  -     POC Urinalysis Dipstick, Automated  -     Ambulatory Referral to Gynecology    2. Abnormal urinalysis  -     Urine Culture - Urine, Urine, Clean Catch      Certainly her known rectocele and probable cystocele as well may be contributing to her above symptoms.  I recommend evaluation by gynecology/urogynecology for this; referral placed to discuss possible surgical correction of this versus conservative management.  She did have evidence of microscopic hematuria so will send for urine culture to ensure no superimposed acute UTI contributing to above.  We discussed pelvic ultrasound but typically GYN may perform in office during the visit so discussed deferring for now which patient agrees.  Patient had several prior \"bad\" experiences with certain GYN providers so she has requested to see physicians for women care but not Dr. Cabrla as noted in the referral.  If she has any new symptoms like fevers, chills, vaginal bleeding, return of hematuria or other concerns she will let us know.    Return for As needed if no improvement or new symptoms, Next scheduled follow up.    Lizett Stratton MD  3/5/2021      "

## 2021-03-06 LAB — BACTERIA SPEC AEROBE CULT: NO GROWTH

## 2021-03-09 ENCOUNTER — PATIENT MESSAGE (OUTPATIENT)
Dept: INTERNAL MEDICINE | Facility: CLINIC | Age: 71
End: 2021-03-09

## 2021-03-09 NOTE — TELEPHONE ENCOUNTER
From: Patricia F Bancroft  To: Lizett Stratton MD  Sent: 3/9/2021 2:49 PM EST  Subject: Test Results Question    Just studying results of my 3/5/21 urinalysis and not sure I understand the meaning of the three flagged components, specifically: Clarity (hazy); Glucose, UA (100mg/dL); and Blood, UA (50 Anoop/ul). Whenever you have time, will you please let me know what these results indicate, if anything? Thank you!

## 2021-03-18 DIAGNOSIS — I10 ESSENTIAL HYPERTENSION: ICD-10-CM

## 2021-03-19 RX ORDER — METOPROLOL SUCCINATE 25 MG/1
TABLET, EXTENDED RELEASE ORAL
Qty: 90 TABLET | Refills: 1 | Status: SHIPPED | OUTPATIENT
Start: 2021-03-19 | End: 2021-09-02 | Stop reason: SDUPTHER

## 2021-03-19 RX ORDER — LISINOPRIL 10 MG/1
TABLET ORAL
Qty: 90 TABLET | Refills: 0 | Status: SHIPPED | OUTPATIENT
Start: 2021-03-19 | End: 2021-05-24

## 2021-03-24 ENCOUNTER — TELEPHONE (OUTPATIENT)
Dept: GYNECOLOGIC ONCOLOGY | Facility: CLINIC | Age: 71
End: 2021-03-24

## 2021-03-24 NOTE — TELEPHONE ENCOUNTER
Caller: NORM    Parth call back number: [phone]    What was the call regarding:     PT HAS AN UPCOMING APPT AND IS NEEDING TO KNOW MORE ABOUT THE PARKING SITUATION IF SHE BRINGS HER     Do you require a callback:  YES    PT GAVE PERMISSION TO [facility]

## 2021-03-25 ENCOUNTER — LAB (OUTPATIENT)
Dept: LAB | Facility: HOSPITAL | Age: 71
End: 2021-03-25

## 2021-03-25 ENCOUNTER — TELEPHONE (OUTPATIENT)
Dept: GYNECOLOGIC ONCOLOGY | Facility: CLINIC | Age: 71
End: 2021-03-25

## 2021-03-25 ENCOUNTER — PATIENT EDUCATION (SURGERY INSTRUCTIONS) (OUTPATIENT)
Dept: GYNECOLOGIC ONCOLOGY | Facility: CLINIC | Age: 71
End: 2021-03-25

## 2021-03-25 ENCOUNTER — OFFICE VISIT (OUTPATIENT)
Dept: GYNECOLOGIC ONCOLOGY | Facility: CLINIC | Age: 71
End: 2021-03-25

## 2021-03-25 VITALS
OXYGEN SATURATION: 96 % | HEART RATE: 76 BPM | RESPIRATION RATE: 18 BRPM | SYSTOLIC BLOOD PRESSURE: 140 MMHG | TEMPERATURE: 96.4 F | WEIGHT: 164 LBS | HEIGHT: 67 IN | BODY MASS INDEX: 25.74 KG/M2 | DIASTOLIC BLOOD PRESSURE: 73 MMHG

## 2021-03-25 DIAGNOSIS — N90.3 VULVAR DYSPLASIA: ICD-10-CM

## 2021-03-25 DIAGNOSIS — N90.3 VULVAR DYSPLASIA: Primary | ICD-10-CM

## 2021-03-25 LAB
ALBUMIN SERPL-MCNC: 4.2 G/DL (ref 3.5–5.2)
ALBUMIN/GLOB SERPL: 1.4 G/DL
ALP SERPL-CCNC: 54 U/L (ref 39–117)
ALT SERPL W P-5'-P-CCNC: 17 U/L (ref 1–33)
ANION GAP SERPL CALCULATED.3IONS-SCNC: 9 MMOL/L (ref 5–15)
AST SERPL-CCNC: 17 U/L (ref 1–32)
BILIRUB SERPL-MCNC: 0.5 MG/DL (ref 0–1.2)
BUN SERPL-MCNC: 16 MG/DL (ref 8–23)
BUN/CREAT SERPL: 20.8 (ref 7–25)
CALCIUM SPEC-SCNC: 9.4 MG/DL (ref 8.6–10.5)
CHLORIDE SERPL-SCNC: 105 MMOL/L (ref 98–107)
CO2 SERPL-SCNC: 28 MMOL/L (ref 22–29)
CREAT SERPL-MCNC: 0.77 MG/DL (ref 0.57–1)
ERYTHROCYTE [DISTWIDTH] IN BLOOD BY AUTOMATED COUNT: 13 % (ref 12.3–15.4)
GFR SERPL CREATININE-BSD FRML MDRD: 74 ML/MIN/1.73
GLOBULIN UR ELPH-MCNC: 3.1 GM/DL
GLUCOSE SERPL-MCNC: 110 MG/DL (ref 65–99)
HCT VFR BLD AUTO: 48.9 % (ref 34–46.6)
HGB BLD-MCNC: 16 G/DL (ref 12–15.9)
LYMPHOCYTES # BLD AUTO: 2.2 10*3/MM3 (ref 0.7–3.1)
LYMPHOCYTES NFR BLD AUTO: 27.2 % (ref 19.6–45.3)
MCH RBC QN AUTO: 30.9 PG (ref 26.6–33)
MCHC RBC AUTO-ENTMCNC: 32.8 G/DL (ref 31.5–35.7)
MCV RBC AUTO: 94.3 FL (ref 79–97)
MONOCYTES # BLD AUTO: 0.6 10*3/MM3 (ref 0.1–0.9)
MONOCYTES NFR BLD AUTO: 7.6 % (ref 5–12)
NEUTROPHILS NFR BLD AUTO: 5.2 10*3/MM3 (ref 1.7–7)
NEUTROPHILS NFR BLD AUTO: 65.2 % (ref 42.7–76)
PLATELET # BLD AUTO: 326 10*3/MM3 (ref 140–450)
PMV BLD AUTO: 8.4 FL (ref 6–12)
POTASSIUM SERPL-SCNC: 4.6 MMOL/L (ref 3.5–5.2)
PROT SERPL-MCNC: 7.3 G/DL (ref 6–8.5)
RBC # BLD AUTO: 5.18 10*6/MM3 (ref 3.77–5.28)
SODIUM SERPL-SCNC: 142 MMOL/L (ref 136–145)
WBC # BLD AUTO: 8 10*3/MM3 (ref 3.4–10.8)

## 2021-03-25 PROCEDURE — 80053 COMPREHEN METABOLIC PANEL: CPT

## 2021-03-25 PROCEDURE — 36415 COLL VENOUS BLD VENIPUNCTURE: CPT

## 2021-03-25 PROCEDURE — 99204 OFFICE O/P NEW MOD 45 MIN: CPT | Performed by: OBSTETRICS & GYNECOLOGY

## 2021-03-25 PROCEDURE — 85025 COMPLETE CBC W/AUTO DIFF WBC: CPT

## 2021-03-25 RX ORDER — FAMOTIDINE 10 MG
TABLET ORAL
COMMUNITY
End: 2021-11-04

## 2021-03-25 NOTE — PATIENT INSTRUCTIONS
Outpatient Pre-op Patient Education  [facility]    Patricia F Bancroft  [id number]  1950    SURGEON: Katya Hill MD     Surgery Coordinator : Reba   If you have any questions               If you have FMLA paperwork or Short Term Disability, please give that to the  either during your visit or after your surgery. A family member may bring the paper work if you can't or your employer may fax it to [phone].           Appointment  1. You have Covid Testing on 04/09/2021 at 12:30 pm. This is located at [facility] Clinic located on the second floor. [address] [place] [address], [address] [place] [address]. Upon arrival patients must park in the designated parking area. Once parked, stay in your car and call [phone]. You must wear a mask to this appointment.    2. Your surgery has been scheduled on 04/12/2021 at [facility] located at [address] [place] [address]. You will need to be there at 8:30 am ( surgery at 9:30 am )   3. You have a post op appointment scheduled 04/26/2021 at 11:45 am. This is with Dr. Hill    The Day(s) Before Surgery  1.  Do not drink alcohol or smoke.     2.  Do not take vitamins or aspirin one week before surgery.       3.  If you are ill on the days leading up to your surgery, please call our office.      4.  If you are using medications for diabetes, call the physician who manages these and get instructions on how they should be taken before and after surgery.    5.  Nothing to eat or drink after midnight on 04/11/2021      6.  Please make prior arrangements for someone to drive you home after your procedure        The Day of Surgery  1.  Do not eat, drink, or chew gum.     2.  On the morning of your surgery, you may take her prescription medications with a sip of water. Bring all medication with you to the surgery center. (Diabetic patients should bring insulin if instructed to do  so by their diabetes managing physician).   3. Bathe or shower the morning of your surgery. Do not use powders, lotions, or creams. Deodorants are okay.     4. Wear loose, comfortable clothing.     5. Bring holders for glasses, contacts, or dentures.      6. Bring any required payment and forms, including insurance cards. Leave all money and valuables at home.        Post-surgery Instructions   1.  For the first 24 hours, rest and take periodic deep breaths to remove anesthetic agents from your body.    2.  Follow any specific instructions relevant to your particular surgery.      3.  Limit activity to avoid stress to the surgical site.       4.  Keep all dressings dry. Shower or bathe as instructed by your doctor.      5.  Drink and eat light foods. Remain on liquids alone only if nausea and vomiting occur. Return to your regular diet gradually, as tolerance allows.     6. Avoid alcohol for at least 24 hours.       7.  Take prescription pain medication as directed and with food.       8.  Call our office after discharge from the surgery center to make your post-op appointment.         In Case of Emergency:       Call our office if you experience any of the following:    Excessive drainage, bleeding, swelling, or redness at the incision site   Severe pain not eased by pain medication  Temperature above 101    Persistent nausea or vomiting    Skin rash or general body itching

## 2021-03-25 NOTE — TELEPHONE ENCOUNTER
DELETE AFTER REVIEWING: Telephone encounter to be sent to the clinical pool     Caller: NORM    Relationship to patient: SELF     Best call back number: [phone]    PATIENT HAS A SCHEDULED COVID TEST ON 3-28-21 HOWEVER SHE ALSO HAS A PAPER FROM THE OFFICE THAT STATE SHE HAS ONE ON THE 29TH ALSO.   SHE WOULD LIKE TO KNOW WHICH APPT TO GO TO     CALL ANYTIME AND YES TO LEAVE VM

## 2021-03-25 NOTE — PROGRESS NOTES
"     New Patient Office Visit      Patient Name: Patricia F Bancroft  : 1950   MRN: [id number]     Referring Physician: Fabi Simmons MD    Chief Complaint:    Chief Complaint   Patient presents with   • Appointment       History of Present Illness: Patricia F Bancroft is a 70 y.o. female who is here today as a consultation for recently diagnosed GABINO 3.  Patient reports that this all first started when she developed concerns for UTI.  She noticed a single drop of blood in the toilet and then noted a \"sunburn\" type feeling on her vulva.  She also noted a pinching of her urethra. She was worked up via telehealth for a urinary tract infection which returned as unremarkable  She occasionally feels a tearing sensation in her left side that she thinks may be attributable to mesh placed during her previous surgery.  She has been sent seen by her PCP who did an exam that demonstrated concern for prolapse.  She was then referred to gynecology.  She then saw Dr. Rodriguez who noted a lesion on the patient's perineum which she biopsy.  This biopsy returned as GABINO 3.  She presents today for discussion of management.    Problem   Vulvar Dysplasia    Referred by Dr. Fabi Simmons    3/11/2021: Vulvar biopsy with GABINO 3 with positive deep and peripheral margins.  The possibility of invasion cannot be excluded.           Past Medical History:   Past Medical History:   Diagnosis Date   • Allergic    • Allergic rhinitis     Seasonal allergies   • Coronary artery disease     S/P JAN to RCA, May 2014 EF 65% no residual Left system disease   • Dyslipidemia    • Dysosmia    • Hx of migraines    • Hyperlipidemia    • Hypertension      Past Surgical History:   Past Surgical History:   Procedure Laterality Date   • BREAST BIOPSY Left    • BREAST EXCISIONAL BIOPSY Left    • BREAST SURGERY      breast biopsy X2   • CARDIAC CATHETERIZATION  2014    Also    • COLONOSCOPY  ~   • CORONARY STENT PLACEMENT  2014 "   • HYSTERECTOMY  2004   • OOPHORECTOMY     • OTHER SURGICAL HISTORY      Stent placed in the right coronary artery 6 weeks ago   Patient reports hysterectomy with bilateral salpingo-oophorectomy.  She believes that this was done for bleeding.  Pathology reviewed by me and demonstrated benign findings.  See pathology data from .  She believes that she may have had mesh placed as part of a rectocele repair or a bladder sling.  I am unable to visualize this operative report.      Family History:   Family History   Problem Relation Age of Onset   • Cancer Mother         Lung cancer;  Dec 1996   • Lung cancer Mother    • Cancer Father         Primary site unknown;  1996   • Prostate cancer Father    • Heart disease Father    • Cancer Sister         Pancreatic Stage 1 diagnosed end of Dec 2017; chemo then surgery then chemo ending Oct 5 2018. Doing well so far.   • Pancreatic cancer Sister    • Heart disease Maternal Grandfather    • Diabetes Cousin    • Breast cancer Neg Hx    • Ovarian cancer Neg Hx    • Uterine cancer Neg Hx    • Colon cancer Neg Hx    • Melanoma Neg Hx        Social History:   Social History     Socioeconomic History   • Marital status:      Spouse name: Not on file   • Number of children: Not on file   • Years of education: Not on file   • Highest education level: Not on file   Tobacco Use   • Smoking status: Former Smoker     Packs/day: 1.00     Years: 40.00     Pack years: 40.00     Types: Cigarettes     Start date: 1966     Quit date:      Years since quittin.6   • Smokeless tobacco: Never Used   Vaping Use   • Vaping Use: Never used   Substance and Sexual Activity   • Alcohol use: Not Currently     Comment: Quit    • Drug use: Not Currently     Types: Marijuana     Comment: as a teenager   • Sexual activity: Yes     Partners: Male     Birth control/protection: Post-menopausal       Past OB/GYN History:   OB History    Para Term  AB Living  "      0         SAB TAB Ectopic Molar Multiple Live Births                 G0  History of abnormal pap test in 1970s - cervix \"cauterized\".    Denies any history of gynecologic problems    Health Maintenance:   Mammogram: Date: 2020 Results: normal  Colonoscopy: Date: 2006 Results: normal; Cologard 2017     Medications:     Current Outpatient Medications:   •  aspirin 162 MG EC tablet, Take 162 mg by mouth daily., Disp: , Rfl:   •  cetirizine (zyrTEC) 10 MG tablet, Take 10 mg by mouth Daily., Disp: , Rfl:   •  docusate sodium (COLACE) 100 MG capsule, Take 100 mg by mouth As Needed. 3 tablets once daily, Disp: , Rfl:   •  estradiol (CLIMARA) 0.025 MG/24HR patch, Place 1 patch on the skin as directed by provider 1 (One) Time Per Week., Disp: 12 patch, Rfl: 3  •  famotidine (Pepcid AC) 10 MG tablet, Pepcid AC 10 mg tablet  daily, Disp: , Rfl:   •  fluticasone (FLONASE) 50 MCG/ACT nasal spray, 2 sprays into the nostril(s) as directed by provider Daily., Disp: , Rfl:   •  lactulose (CHRONULAC) 10 GM/15ML solution, Take 30 mL by mouth 2 (Two) Times a Day As Needed (constipation)., Disp: 946 mL, Rfl: 3  •  lisinopril (PRINIVIL,ZESTRIL) 10 MG tablet, TAKE 1 TABLET DAILY, Disp: 90 tablet, Rfl: 0  •  metoprolol succinate XL (TOPROL-XL) 25 MG 24 hr tablet, TAKE 1 TABLET DAILY, Disp: 90 tablet, Rfl: 1  •  rosuvastatin (CRESTOR) 40 MG tablet, Take 1 tablet by mouth Daily., Disp: 90 tablet, Rfl: 3    Allergies:   Allergies   Allergen Reactions   • Dust Mite Extract Cough, Itching and Other (See Comments)   • Ciprofloxacin Rash   • Ibuprofen Rash       Review of Systems:   Review of Systems   Constitutional: Negative for appetite change, chills, fatigue, fever and unexpected weight change.   HENT: Positive for rhinorrhea, sinus pressure and sinus pain. Negative for congestion, hearing loss, sneezing, sore throat and tinnitus.    Eyes: Negative for visual disturbance.   Respiratory: Negative for cough, shortness of breath and " "wheezing.    Cardiovascular: Negative for chest pain, palpitations and leg swelling.   Gastrointestinal: Positive for constipation. Negative for abdominal distention, abdominal pain, diarrhea, nausea and vomiting.   Genitourinary: Positive for vaginal pain (Burning in vulva). Negative for dyspareunia, dysuria, frequency, hematuria, pelvic pain, urgency and vaginal bleeding.   Musculoskeletal: Positive for arthralgias and myalgias. Negative for back pain.   Skin: Negative for color change, pallor and rash.   Neurological: Negative for dizziness, light-headedness, numbness and headaches.   Hematological: Negative for adenopathy. Does not bruise/bleed easily.   Psychiatric/Behavioral: Negative for dysphoric mood. The patient is not nervous/anxious.         Objective     Physical Exam:  Vital Signs:   Vitals:    03/25/21 1253   BP: 140/73  Comment: LUE   Pulse: 76   Resp: 18   Temp: 96.4 °F (35.8 °C)   TempSrc: Infrared   SpO2: 96%  Comment: RA   Weight: 74.4 kg (164 lb)   Height: 170.2 cm (67\")   PainSc: 2  Comment: Vulva     BMI: Body mass index is 25.69 kg/m².   ECOG score: 0           PHQ-2 Depression Screening  Little interest or pleasure in doing things?     Feeling down, depressed, or hopeless?     PHQ-2 Total Score       Physical Exam  Vitals and nursing note reviewed. Exam conducted with a chaperone present.   Constitutional:       General: She is not in acute distress.     Appearance: Normal appearance. She is well-developed. She is not diaphoretic.   HENT:      Head: Normocephalic and atraumatic.      Right Ear: External ear normal.      Left Ear: External ear normal.      Nose: Nose normal.   Eyes:      General: No scleral icterus.        Right eye: No discharge.         Left eye: No discharge.      Conjunctiva/sclera: Conjunctivae normal.   Neck:      Thyroid: No thyromegaly.   Cardiovascular:      Rate and Rhythm: Normal rate and regular rhythm.      Heart sounds: No murmur heard.     Pulmonary:      " Effort: Pulmonary effort is normal. No respiratory distress.      Breath sounds: Normal breath sounds. No wheezing.   Abdominal:      General: Bowel sounds are normal. There is no distension.      Palpations: Abdomen is soft. There is no mass.      Tenderness: There is no abdominal tenderness. There is no guarding.   Genitourinary:     Comments: Hyperkeratotic lesion originating from the posterior labium majorum crossing the midline along the posterior forchette Vagina without discharge. Cervix, uterus and adnexa surgically absent.  Musculoskeletal:         General: No swelling, tenderness, deformity or signs of injury.      Cervical back: Neck supple.      Right lower leg: No edema.      Left lower leg: No edema.   Lymphadenopathy:      Cervical: No cervical adenopathy.   Skin:     General: Skin is warm and dry.      Coloration: Skin is not jaundiced.      Findings: No erythema, lesion or rash.   Neurological:      General: No focal deficit present.      Mental Status: She is alert and oriented to person, place, and time. Mental status is at baseline.      Motor: No abnormal muscle tone.   Psychiatric:         Behavior: Behavior normal.         Thought Content: Thought content normal.       Imagin2020: CT A/P for bloating with no acute intra-abdominal process.  Sigmoid colonic diverticulosis noted.  Advanced atherosclerotic plaques present.  Gallbladder adenomyomatosis and small hiatal and fat-containing umbilical hernia is noted.  Uterus absent.  No intra-abdominal adenopathy.    Pathology:  3/11/2021: Vulvar biopsy with GABINO 3 with positive deep and peripheral margins.  The possibility of invasion cannot be excluded.      Assessment / Plan    Patricia F Bancroft is a 70 y.o. 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  I have counseled the patient on the natural course of this disease and its potential progression to invasive disease.  At  this time, she is amenable to simple partial vulvectomy. She is aware that this process may recur and continued surveillance of the entire lower genital tract is important. She was counseled on the risks of the procedure including infection, wound breakdown, scarring, injury to surrounding areas, and need for another procedure (if invasive disease is encountered). She did convey understanding and after all questions were answered, she signed the informed consent.  We will arrange for pre-operative testing and the patient will be scheduled for surgery in the near future. CBC, CMP and COVID testing ordered.    Pain assessment was performed today as a part of patient’s care.  For patients with pain related to surgery, gynecologic malignancy or cancer treatment, the plan is as noted in the assessment/plan.  For patients with pain not related to these issues, they are to seek any further needed care from a more appropriate provider, such as PCP.      Diagnoses and all orders for this visit:    1. Vulvar dysplasia (Primary)    Other orders  -     SCANNED PATHOLOGY       MDM  Number of Diagnoses or Management Options  Vulvar dysplasia: new, needed workup     Amount and/or Complexity of Data Reviewed  Clinical lab tests: ordered  Tests in the radiology section of CPT®: reviewed  Tests in the medicine section of CPT®: ordered  Discussion of test results with the performing providers: no  Decide to obtain previous medical records or to obtain history from someone other than the patient: no  Obtain history from someone other than the patient: no  Review and summarize past medical records: yes  Discuss the patient with other providers: no  Independent visualization of images, tracings, or specimens: no    Risk of Complications, Morbidity, and/or Mortality  Presenting problems: moderate  Diagnostic procedures: moderate  Management options: low        Follow Up: Surgery    IVANA Hill MD  Gynecologic Oncology     Please note  that portions of this note may have been completed with a voice recognition program.

## 2021-03-25 NOTE — TELEPHONE ENCOUNTER
Discussed with Reba.  Patient advised to keep appointment on 3-28-21 that is on the schedule.  Patient asked if the testing is in her PCP office at [facility].  She states she will call them tomorrow and call back if she has any questions.

## 2021-03-26 PROBLEM — N90.3 VULVAR DYSPLASIA: Status: ACTIVE | Noted: 2021-03-26

## 2021-03-28 ENCOUNTER — APPOINTMENT (OUTPATIENT)
Dept: PREADMISSION TESTING | Facility: HOSPITAL | Age: 71
End: 2021-03-28

## 2021-03-28 LAB — SARS-COV-2 RNA NOSE QL NAA+PROBE: NOT DETECTED

## 2021-03-28 PROCEDURE — C9803 HOPD COVID-19 SPEC COLLECT: HCPCS

## 2021-03-28 PROCEDURE — U0004 COV-19 TEST NON-CDC HGH THRU: HCPCS

## 2021-03-28 PROCEDURE — U0005 INFEC AGEN DETEC AMPLI PROBE: HCPCS

## 2021-03-31 ENCOUNTER — OUTSIDE FACILITY SERVICE (OUTPATIENT)
Dept: GYNECOLOGIC ONCOLOGY | Facility: CLINIC | Age: 71
End: 2021-03-31

## 2021-03-31 ENCOUNTER — TELEPHONE (OUTPATIENT)
Dept: GYNECOLOGIC ONCOLOGY | Facility: CLINIC | Age: 71
End: 2021-03-31

## 2021-04-09 ENCOUNTER — APPOINTMENT (OUTPATIENT)
Dept: PREADMISSION TESTING | Facility: HOSPITAL | Age: 71
End: 2021-04-09

## 2021-04-09 PROCEDURE — U0004 COV-19 TEST NON-CDC HGH THRU: HCPCS

## 2021-04-09 PROCEDURE — U0005 INFEC AGEN DETEC AMPLI PROBE: HCPCS

## 2021-04-09 PROCEDURE — C9803 HOPD COVID-19 SPEC COLLECT: HCPCS

## 2021-04-10 LAB — SARS-COV-2 RNA NOSE QL NAA+PROBE: NOT DETECTED

## 2021-04-12 ENCOUNTER — OUTSIDE FACILITY SERVICE (OUTPATIENT)
Dept: GYNECOLOGIC ONCOLOGY | Facility: CLINIC | Age: 71
End: 2021-04-12

## 2021-04-12 DIAGNOSIS — G89.18 POST-OP PAIN: Primary | ICD-10-CM

## 2021-04-12 PROCEDURE — 56620 VULVECTOMY SIMPLE PARTIAL: CPT | Performed by: OBSTETRICS & GYNECOLOGY

## 2021-04-12 RX ORDER — OXYCODONE HYDROCHLORIDE 5 MG/1
5 TABLET ORAL EVERY 4 HOURS PRN
Qty: 10 TABLET | Refills: 0 | Status: CANCELLED | OUTPATIENT
Start: 2021-04-12

## 2021-04-12 RX ORDER — OXYCODONE HYDROCHLORIDE AND ACETAMINOPHEN 5; 325 MG/1; MG/1
1 TABLET ORAL EVERY 6 HOURS PRN
Qty: 10 TABLET | Refills: 0 | Status: SHIPPED | OUTPATIENT
Start: 2021-04-12 | End: 2021-04-26

## 2021-04-12 NOTE — TELEPHONE ENCOUNTER
Patient called after hours through answering service. Pain Rx had not yet made it to her pharmacy. She also notes that the immediate-release oxycodone is not on her formulary. Informed patient I will send Rx for Percocet 5-325 now. Instructed patient not to take extra Tylenol with this medication, no more than 4000 mg acetaminophen in 24 hours. She v/u. We reviewed medication instructions, risks, benefits, and potential side effects.

## 2021-04-12 NOTE — TELEPHONE ENCOUNTER
Pt had surgery this morning (Simple Partial Vulvectomy) and called with questions regarding her Post Op/Recovery care instructions.

## 2021-04-20 ENCOUNTER — TELEPHONE (OUTPATIENT)
Dept: ONCOLOGY | Facility: CLINIC | Age: 71
End: 2021-04-20

## 2021-04-20 ENCOUNTER — OFFICE VISIT (OUTPATIENT)
Dept: GYNECOLOGIC ONCOLOGY | Facility: CLINIC | Age: 71
End: 2021-04-20

## 2021-04-20 VITALS
TEMPERATURE: 97.3 F | DIASTOLIC BLOOD PRESSURE: 70 MMHG | HEART RATE: 70 BPM | HEIGHT: 67 IN | WEIGHT: 162 LBS | RESPIRATION RATE: 18 BRPM | SYSTOLIC BLOOD PRESSURE: 142 MMHG | BODY MASS INDEX: 25.43 KG/M2 | OXYGEN SATURATION: 98 %

## 2021-04-20 DIAGNOSIS — Z98.890 POST-OPERATIVE STATE: Primary | ICD-10-CM

## 2021-04-20 PROCEDURE — 99024 POSTOP FOLLOW-UP VISIT: CPT | Performed by: NURSE PRACTITIONER

## 2021-04-20 RX ORDER — SULFAMETHOXAZOLE AND TRIMETHOPRIM 800; 160 MG/1; MG/1
1 TABLET ORAL 2 TIMES DAILY
Qty: 14 TABLET | Refills: 0 | Status: SHIPPED | OUTPATIENT
Start: 2021-04-20 | End: 2021-04-27

## 2021-04-20 RX ORDER — LIDOCAINE 50 MG/G
OINTMENT TOPICAL
Qty: 30 G | Refills: 0 | Status: SHIPPED | OUTPATIENT
Start: 2021-04-20 | End: 2021-06-01

## 2021-04-20 NOTE — TELEPHONE ENCOUNTER
Patient left a voicemail she is having some symptoms after her surgery. She is worried she might have an infection. Please call.

## 2021-04-20 NOTE — TELEPHONE ENCOUNTER
I called patient and she states she has pus and bilateral knots on her vulva similar to when she has had bartholin gland cysts prior to hysterectomy.       She was advised she needs to come in for a visit for APRN to assess her post operative wound. She was placed on the schedule.

## 2021-04-26 ENCOUNTER — OFFICE VISIT (OUTPATIENT)
Dept: GYNECOLOGIC ONCOLOGY | Facility: CLINIC | Age: 71
End: 2021-04-26

## 2021-04-26 VITALS
OXYGEN SATURATION: 97 % | TEMPERATURE: 97.7 F | SYSTOLIC BLOOD PRESSURE: 141 MMHG | HEART RATE: 76 BPM | BODY MASS INDEX: 25.11 KG/M2 | WEIGHT: 160 LBS | DIASTOLIC BLOOD PRESSURE: 71 MMHG | HEIGHT: 67 IN | RESPIRATION RATE: 18 BRPM

## 2021-04-26 DIAGNOSIS — N90.3 VULVAR DYSPLASIA: Primary | ICD-10-CM

## 2021-04-26 PROCEDURE — 99024 POSTOP FOLLOW-UP VISIT: CPT | Performed by: OBSTETRICS & GYNECOLOGY

## 2021-04-26 NOTE — PROGRESS NOTES
"Patricia F Bancroft  [id number]  1950      Reason for Visit:  Postoperative evaluation, c/o discomfort and drainage    History of Present Illness:  Patient is a very pleasant 70 y.o. woman who presents for a post operative evaluation status post simple partial vulvectomy performed on 4/12/2021 by Dr. Hill.      Patient called office this morning with c/o pus-like drainage near her vulvar incision sites. She reports a history of Bartholin's gland abscesses years ago and fears this may have recurred. She admits she is not using her Interdry cloth.    Surgery and hospital course were uncomplicated.  Today, patient notes normal bowel and bladder function.  Her pain is well controlled.     Past Medical History, Past Surgical History, Social History, Family History have been reviewed and are without significant changes except as mentioned.    Review of Systems   All other systems were reviewed and are negative except as mentioned above.    Medications:  The current medication list was reviewed in the EMR    ALLERGIES:    Allergies   Allergen Reactions   • Dust Mite Extract Itching, Other (See Comments) and Cough     Household Dust   • Ciprofloxacin Myalgia   • Ibuprofen Hives           /70 Comment: LUE  Pulse 70   Temp 97.3 °F (36.3 °C) (Infrared)   Resp 18   Ht 170.2 cm (67\")   Wt 73.5 kg (162 lb)   LMP  (LMP Unknown)   SpO2 98% Comment: RA  BMI 25.37 kg/m²   ECOG score: 1              Physical Exam  Constitutional:  Patient is a pleasant woman in no acute distress.  Gastrointestinal: Abdomen is soft and nontender.  There is no rebound or guarding.    Extremities:  Bilateral lower extremities are non-tender.  Gynecologic:Remarkable for bilateral vulvar incisions. Right vulvar wound clean, dry, intact. Very superficial separation noted at perineum. No drainage, cellulitis, or evidence of infection to right side or perineum. Left vulvar incision clean and intact. There is one very small area of " drainage noted at the lower left wound or just below, drainage elicited with pressure to the area. No acute abscess or cellulitis.         ASSESSMENT/PLAN:  Patricia F Bancroft returns for a post-operative evaluation today.        Overall, the patient is very pleased with her care.  I recommended continuation of post operative precautions as discussed.   We discussed very small area of drainage to lower left vulva. I will send Rx for Bactrim now. Precautions and signs/symptoms of abscess or wound infection reviewed. Patient to call if symptoms worsen. Very superficial separation noted at perineum, recommended Interdry placement. Reviewed how to use cloth and additional product given today. Rx for topical lidocaine ointment also given to use PRN discomfort. I would like patient to keep her scheduled follow-up with Dr. Hill in 6 days for repeat wound evaluation. She v/u.     She is to follow-up with Dr. Hill for post-op exam in 1 week.     Norma Muñoz, APRN  4/20/2021

## 2021-04-26 NOTE — PROGRESS NOTES
"     Post Operative Office Visit      Patient Name: Patricia F Bancroft  : 1950   MRN: [id number]     Chief Complaint:  Postoperative visit    History of Present Illness: Patricia F Bancroft is a 70 y.o. female who is status post simple partial vulvectomy on 2021 for symptomatic vulvar mass with GABINO 3. Her post operative course has been unremarkable and continues to be recovering well. She is tolerating a normal diet. She reports normal return of bowel function. She states her pain is well controlled. Denies any recent fevers, chills, vaginal bleeding, abnormal discharge. She continues to use pericare + hair dryer after using the bathroom, as well as interdry and ice packs. Denies any concerns today.    Medical, surgical, and family history updated as needed.  Subjective      Review of Systems:   Review of Systems   Constitutional: Negative for activity change, fatigue and fever.   Respiratory: Negative for shortness of breath.    Cardiovascular: Negative for chest pain and leg swelling.   Gastrointestinal: Negative for abdominal pain, constipation, diarrhea, nausea and vomiting.   Genitourinary: Negative for difficulty urinating, vaginal bleeding and vaginal discharge.   Neurological: Negative for dizziness and light-headedness.        Objective     Physical Exam:  Vital Signs:   Vitals:    21 1144   BP: 141/71  Comment: LUE   Pulse: 76   Resp: 18   Temp: 97.7 °F (36.5 °C)   TempSrc: Infrared   SpO2: 97%  Comment: RA   Weight: 72.6 kg (160 lb)   Height: 170.2 cm (67\")   PainSc: 0-No pain     BMI: Body mass index is 25.06 kg/m².     Physical Exam  Vitals and nursing note reviewed. Exam conducted with a chaperone present.   Constitutional:       General: She is not in acute distress.     Appearance: Normal appearance. She is well-developed. She is not diaphoretic.   HENT:      Head: Normocephalic and atraumatic.      Right Ear: External ear normal.      Left Ear: External ear normal.      Nose: " Nose normal.   Eyes:      General: No scleral icterus.        Right eye: No discharge.         Left eye: No discharge.      Conjunctiva/sclera: Conjunctivae normal.   Neck:      Thyroid: No thyromegaly.   Cardiovascular:      Rate and Rhythm: Normal rate.   Pulmonary:      Effort: Pulmonary effort is normal. No respiratory distress.   Abdominal:      General: There is no distension.      Palpations: Abdomen is soft. There is no mass.      Tenderness: There is no abdominal tenderness. There is no guarding.   Genitourinary:         Comments: Surgical sites appear to be healing well. Left vulvar area appears to be well-healed. Right vulvar area with 1-cm area of opening with yellow exudate. No evidence of erythema, purulent discharge. Several exposed sutures that remain intact. Vagina without discharge. Internal exam deferred.  Musculoskeletal:         General: No swelling, tenderness, deformity or signs of injury.      Cervical back: Neck supple.      Right lower leg: No edema.      Left lower leg: No edema.   Lymphadenopathy:      Cervical: No cervical adenopathy.   Skin:     General: Skin is warm and dry.      Coloration: Skin is not jaundiced.      Findings: No erythema, lesion or rash.   Neurological:      General: No focal deficit present.      Mental Status: She is alert and oriented to person, place, and time. Mental status is at baseline.      Motor: No abnormal muscle tone.   Psychiatric:         Behavior: Behavior normal.         Thought Content: Thought content normal.         Medications:     Current Outpatient Medications:   •  aspirin 162 MG EC tablet, Take 162 mg by mouth daily., Disp: , Rfl:   •  cetirizine (zyrTEC) 10 MG tablet, Take 10 mg by mouth Daily., Disp: , Rfl:   •  docusate sodium (COLACE) 100 MG capsule, Take 100 mg by mouth As Needed. 3 tablets once daily, Disp: , Rfl:   •  estradiol (CLIMARA) 0.025 MG/24HR patch, Place 1 patch on the skin as directed by provider 1 (One) Time Per Week.,  Disp: 12 patch, Rfl: 3  •  famotidine (Pepcid AC) 10 MG tablet, Pepcid AC 10 mg tablet  daily, Disp: , Rfl:   •  fluticasone (FLONASE) 50 MCG/ACT nasal spray, 2 sprays into the nostril(s) as directed by provider Daily., Disp: , Rfl:   •  lidocaine (XYLOCAINE) 5 % ointment, Apply  topically to the appropriate area as directed Every 2 (Two) Hours As Needed for Mild Pain ., Disp: 30 g, Rfl: 0  •  lisinopril (PRINIVIL,ZESTRIL) 10 MG tablet, TAKE 1 TABLET DAILY, Disp: 90 tablet, Rfl: 0  •  metoprolol succinate XL (TOPROL-XL) 25 MG 24 hr tablet, TAKE 1 TABLET DAILY, Disp: 90 tablet, Rfl: 1  •  rosuvastatin (CRESTOR) 40 MG tablet, Take 1 tablet by mouth Daily., Disp: 90 tablet, Rfl: 3  •  sulfamethoxazole-trimethoprim (Bactrim DS) 800-160 MG per tablet, Take 1 tablet by mouth 2 (Two) Times a Day for 7 days., Disp: 14 tablet, Rfl: 0  Current outpatient and discharge medications have been reconciled for the patient.  Reviewed by: Katya Hill MD      Allergies:   Allergies   Allergen Reactions   • Dust Mite Extract Itching, Other (See Comments) and Cough     Household Dust   • Ciprofloxacin Myalgia   • Ibuprofen Hives       Pathology:   PERINEAL BIOPSY, BODY:  High grade squamous intraepithelial lesion. Negative for invasive carcinoma. Incidental intradermal nevus. All margins free of HSIL  Operative findings again reviewed. Pathology report discussed.       Assessment / Plan    Patricia F Bancroft is a 70 y.o. female who is status post simple partial vulvectomy on 4/12/2021 for symptomatic vulvar mass with GABINO 3. Her post operative course has been unremarkable and continues to be recovering well.   - Discussed continuing pericare to help with wound healing. Overall happy with her progress.  - Recommended follow-up with GYN provider (either us or Dr. Simmons's office) every 6-months for the next 5-years for a thorough vulvar, vaginal and cervical exam. She opts to follow with us.  - Return precautions  discussed.    Assessment/Plan:   Diagnoses and all orders for this visit:    1. Vulvar dysplasia (Primary)         Follow Up: 6-months for surveillance visit    Lizbeth Chery MD, PGY-2  Resident Physician  Gynecologic Oncology    I saw and evaluated the patient. Discussed with resident and agree with resident’s findings and plan as documented in the resident’s note with the following additions:    Patient doing well. Reassurance given. Plan for surveillance.    IVANA Hill MD  Gynecologic Oncology     Please note that portions of this note may have been completed with a voice recognition program.

## 2021-05-23 DIAGNOSIS — I10 ESSENTIAL HYPERTENSION: Primary | ICD-10-CM

## 2021-05-24 RX ORDER — LISINOPRIL 10 MG/1
TABLET ORAL
Qty: 90 TABLET | Refills: 1 | Status: SHIPPED | OUTPATIENT
Start: 2021-05-24 | End: 2021-11-18

## 2021-06-01 ENCOUNTER — TELEPHONE (OUTPATIENT)
Dept: INTERNAL MEDICINE | Facility: CLINIC | Age: 71
End: 2021-06-01

## 2021-06-01 ENCOUNTER — OFFICE VISIT (OUTPATIENT)
Dept: INTERNAL MEDICINE | Facility: CLINIC | Age: 71
End: 2021-06-01

## 2021-06-01 VITALS
WEIGHT: 162 LBS | TEMPERATURE: 97.3 F | BODY MASS INDEX: 25.43 KG/M2 | OXYGEN SATURATION: 97 % | RESPIRATION RATE: 15 BRPM | SYSTOLIC BLOOD PRESSURE: 122 MMHG | HEART RATE: 73 BPM | DIASTOLIC BLOOD PRESSURE: 74 MMHG | HEIGHT: 67 IN

## 2021-06-01 DIAGNOSIS — L60.0 ONYCHOCRYPTOSIS: ICD-10-CM

## 2021-06-01 DIAGNOSIS — I25.10 CORONARY ARTERY DISEASE INVOLVING NATIVE CORONARY ARTERY OF NATIVE HEART WITHOUT ANGINA PECTORIS: Primary | ICD-10-CM

## 2021-06-01 DIAGNOSIS — I10 ESSENTIAL HYPERTENSION: ICD-10-CM

## 2021-06-01 DIAGNOSIS — E78.2 MIXED HYPERLIPIDEMIA: ICD-10-CM

## 2021-06-01 DIAGNOSIS — E11.65 TYPE 2 DIABETES MELLITUS WITH HYPERGLYCEMIA, WITHOUT LONG-TERM CURRENT USE OF INSULIN (HCC): ICD-10-CM

## 2021-06-01 LAB
A/C: NORMAL
EXPIRATION DATE: NORMAL
EXPIRATION DATE: NORMAL
HBA1C MFR BLD: 5.8 %
Lab: NORMAL
Lab: NORMAL
POC CREATININE URINE: NORMAL
POC MICROALBUMIN URINE: NORMAL

## 2021-06-01 PROCEDURE — 83036 HEMOGLOBIN GLYCOSYLATED A1C: CPT | Performed by: PHYSICIAN ASSISTANT

## 2021-06-01 PROCEDURE — 82044 UR ALBUMIN SEMIQUANTITATIVE: CPT | Performed by: PHYSICIAN ASSISTANT

## 2021-06-01 PROCEDURE — 99214 OFFICE O/P EST MOD 30 MIN: CPT | Performed by: PHYSICIAN ASSISTANT

## 2021-06-01 NOTE — TELEPHONE ENCOUNTER
Please call pt and let her know her A1c looks great at 5.8, and her urine test was normal. Keep up the good work.

## 2021-06-01 NOTE — PROGRESS NOTES
"Chief Complaint   Patient presents with   • Establish Care     Previous [facility] pt   • Hypertension     6 month F/U   • Hyperlipidemia     6 month F/U   • Diabetes     6 month F/U, Michael Pandey's eye exam 05/13/2021   • Toe Pain     Left great toe, sorness, redness at times       Subjective       History of Present Illness     Patricia F Bancroft is a 70 y.o. female. She presents to re-establish care from Dr. Stratton, and for routine follow up. Pt with CAD/ HLD, taking ASA and Crestor as directed.  Denies chest pain, HA, fatigue, myalgias or weakness. No new concerns.     HTN on metoprolol, lisinopril. She is not checking her BP at home. She denies chest pain, SOA, vision change, or swelling of extremities. She has had head pressure at night, occurs within 10-15 mins of laying down and has a \"rush\" to her head with pressure, but denies any true pain or headache. This resolves spontaneously with 15 seconds. Occurring for the last 2 months off and on, not nightly. She does have chronic allergies, previously on allergy shots but d/c these 2 years ago and feels allergies are under good control.     Type II DM not currently on any medications. She does not check her glucose at home. She denies headaches or foggy-headedness, vision change, abdominal pain, N/V/D, frequent urination or changes in urination or BMs, or neuropathy. No new concerns. Most recent A1c at 6.5 in Dec 2020.     She also has an ingrown toenail at L great toe, worsening over the last 6 months. She has soreness and redness at times. She has not tried anything for the tx of this issue.       When she is laying down at night, within 15 mins of laying down, fullness and slight pounding of head. Lasts <15 seconds then resolves spontaneously. Pressure, no true headache  2 months, not every night   Allergy shots for 30 years, stopped 2 years ago, allergy sx under good control-- dust, pollens    Ingrown toenail L great toe, lateral aspect-- 6 " months      The following portions of the patient's history were reviewed and updated as appropriate: allergies, current medications, past family history, past medical history, past social history and problem list.    Allergies   Allergen Reactions   • Ciprofloxacin Myalgia   • Dust Mite Extract Itching, Other (See Comments) and Cough     Household Dust   • Ibuprofen Hives     Social History     Tobacco Use   • Smoking status: Former Smoker     Packs/day: 1.00     Years: 40.00     Pack years: 40.00     Types: Cigarettes     Start date: 1966     Quit date:      Years since quittin.4   • Smokeless tobacco: Never Used   Substance Use Topics   • Alcohol use: Not Currently     Comment: Quit          Current Outpatient Medications:   •  aspirin 162 MG EC tablet, Take 162 mg by mouth daily., Disp: , Rfl:   •  cetirizine (zyrTEC) 10 MG tablet, Take 10 mg by mouth Daily., Disp: , Rfl:   •  docusate sodium (COLACE) 100 MG capsule, Take 100 mg by mouth As Needed. 3 tablets once daily, Disp: , Rfl:   •  estradiol (CLIMARA) 0.025 MG/24HR patch, Place 1 patch on the skin as directed by provider 1 (One) Time Per Week., Disp: 12 patch, Rfl: 3  •  famotidine (Pepcid AC) 10 MG tablet, Pepcid AC 10 mg tablet  daily, Disp: , Rfl:   •  fluticasone (FLONASE) 50 MCG/ACT nasal spray, 2 sprays into the nostril(s) as directed by provider Daily., Disp: , Rfl:   •  lisinopril (PRINIVIL,ZESTRIL) 10 MG tablet, TAKE 1 TABLET DAILY, Disp: 90 tablet, Rfl: 1  •  metoprolol succinate XL (TOPROL-XL) 25 MG 24 hr tablet, TAKE 1 TABLET DAILY, Disp: 90 tablet, Rfl: 1  •  rosuvastatin (CRESTOR) 40 MG tablet, Take 1 tablet by mouth Daily., Disp: 90 tablet, Rfl: 3    Review of Systems   Constitutional: Negative for chills, fatigue and fever.   HENT: Negative for congestion, ear pain, sore throat and trouble swallowing.    Eyes: Negative for pain.   Respiratory: Negative for cough, shortness of breath and wheezing.    Cardiovascular: Negative  for chest pain, palpitations and leg swelling.   Gastrointestinal: Negative for abdominal pain, diarrhea, nausea and vomiting.   Genitourinary: Negative for dysuria and hematuria.   Musculoskeletal: Negative for back pain.   Skin: Negative for rash.        +onychocryptosis   Allergic/Immunologic: Negative for immunocompromised state.   Neurological: Negative for dizziness, syncope, weakness and headache.   Psychiatric/Behavioral: Negative for depressed mood. The patient is not nervous/anxious.        Objective   Vitals:    06/01/21 0953   BP: 122/74   Pulse: 73   Resp: 15   Temp: 97.3 °F (36.3 °C)   SpO2: 97%     Physical Exam  Constitutional:       Appearance: Normal appearance. She is well-developed.   HENT:      Head: Normocephalic and atraumatic.      Right Ear: Tympanic membrane, ear canal and external ear normal.      Left Ear: Tympanic membrane, ear canal and external ear normal.      Nose: Nose normal.      Mouth/Throat:      Mouth: Mucous membranes are moist.      Pharynx: Oropharynx is clear.   Eyes:      Conjunctiva/sclera: Conjunctivae normal.   Neck:      Thyroid: No thyromegaly.      Vascular: No carotid bruit.   Cardiovascular:      Rate and Rhythm: Normal rate and regular rhythm.      Heart sounds: No murmur heard.     Pulmonary:      Effort: Pulmonary effort is normal.      Breath sounds: Normal breath sounds. No wheezing or rales.   Abdominal:      Palpations: Abdomen is soft. There is no mass.      Tenderness: There is no abdominal tenderness.   Musculoskeletal:      Cervical back: Neck supple.   Lymphadenopathy:      Cervical: No cervical adenopathy.   Skin:     Findings: No rash.      Comments: +onychocryptosis of L great toe at lateral aspect with slight TTP. No signs of infection.    Psychiatric:         Behavior: Behavior normal.               Assessment/Plan   Diagnoses and all orders for this visit:    1. Coronary artery disease involving native coronary artery of native heart without  angina pectoris (Primary)  - Continue Crestor, ASA.     2. Onychocryptosis  -     Ambulatory Referral to Podiatry    3. Type 2 diabetes mellitus with hyperglycemia, without long-term current use of insulin (CMS/[facility])  -     POC Glycosylated Hemoglobin (Hb A1C)  -     POC Microalbumin    4. Essential hypertension  - Continue metoprolol, lisinopril.     5. Mixed hyperlipidemia  - Continue Crestor.            Return in about 6 months (around 12/2/2021) for Subs AMW.

## 2021-06-17 ENCOUNTER — TELEPHONE (OUTPATIENT)
Dept: INTERNAL MEDICINE | Facility: CLINIC | Age: 71
End: 2021-06-17

## 2021-06-17 ENCOUNTER — TRANSCRIBE ORDERS (OUTPATIENT)
Dept: ADMINISTRATIVE | Facility: HOSPITAL | Age: 71
End: 2021-06-17

## 2021-06-17 DIAGNOSIS — Z12.31 VISIT FOR SCREENING MAMMOGRAM: Primary | ICD-10-CM

## 2021-06-17 NOTE — TELEPHONE ENCOUNTER
[facility] [phone]  Neha   Requested pt's most recent eye exam, confirmed our fax number as [phone]. Neha confirmed she'll fax the reports to our office ASAP. Good verbal understanding.

## 2021-06-17 NOTE — TELEPHONE ENCOUNTER
----- Message from Norma Rae PA-C sent at 6/16/2021  7:50 PM EDT -----  Please request eye exam from Dr. Bean's office in May 2021.

## 2021-08-03 DIAGNOSIS — E78.2 MIXED HYPERLIPIDEMIA: ICD-10-CM

## 2021-08-03 DIAGNOSIS — I10 ESSENTIAL HYPERTENSION: ICD-10-CM

## 2021-08-03 DIAGNOSIS — I25.10 CORONARY ARTERY DISEASE INVOLVING NATIVE CORONARY ARTERY OF NATIVE HEART WITHOUT ANGINA PECTORIS: Primary | ICD-10-CM

## 2021-08-06 ENCOUNTER — LAB (OUTPATIENT)
Dept: INTERNAL MEDICINE | Facility: CLINIC | Age: 71
End: 2021-08-06

## 2021-08-06 ENCOUNTER — LAB (OUTPATIENT)
Dept: LAB | Facility: HOSPITAL | Age: 71
End: 2021-08-06

## 2021-08-06 DIAGNOSIS — E78.2 MIXED HYPERLIPIDEMIA: ICD-10-CM

## 2021-08-06 DIAGNOSIS — I25.10 CORONARY ARTERY DISEASE INVOLVING NATIVE CORONARY ARTERY OF NATIVE HEART WITHOUT ANGINA PECTORIS: ICD-10-CM

## 2021-08-06 DIAGNOSIS — I10 ESSENTIAL HYPERTENSION: ICD-10-CM

## 2021-08-06 LAB
ANION GAP SERPL CALCULATED.3IONS-SCNC: 8.8 MMOL/L (ref 5–15)
BUN SERPL-MCNC: 12 MG/DL (ref 8–23)
BUN/CREAT SERPL: 16.4 (ref 7–25)
CALCIUM SPEC-SCNC: 8.9 MG/DL (ref 8.6–10.5)
CHLORIDE SERPL-SCNC: 107 MMOL/L (ref 98–107)
CHOLEST SERPL-MCNC: 167 MG/DL (ref 0–200)
CO2 SERPL-SCNC: 26.2 MMOL/L (ref 22–29)
CREAT SERPL-MCNC: 0.73 MG/DL (ref 0.57–1)
GFR SERPL CREATININE-BSD FRML MDRD: 79 ML/MIN/1.73
GLUCOSE SERPL-MCNC: 86 MG/DL (ref 65–99)
HDLC SERPL-MCNC: 61 MG/DL (ref 40–60)
LDLC SERPL CALC-MCNC: 86 MG/DL (ref 0–100)
LDLC/HDLC SERPL: 1.36 {RATIO}
POTASSIUM SERPL-SCNC: 4.5 MMOL/L (ref 3.5–5.2)
SODIUM SERPL-SCNC: 142 MMOL/L (ref 136–145)
TRIGL SERPL-MCNC: 115 MG/DL (ref 0–150)
VLDLC SERPL-MCNC: 20 MG/DL (ref 5–40)

## 2021-08-06 PROCEDURE — 80061 LIPID PANEL: CPT | Performed by: PHYSICIAN ASSISTANT

## 2021-08-06 PROCEDURE — 80048 BASIC METABOLIC PNL TOTAL CA: CPT | Performed by: PHYSICIAN ASSISTANT

## 2021-08-23 ENCOUNTER — OFFICE VISIT (OUTPATIENT)
Dept: CARDIOLOGY | Facility: CLINIC | Age: 71
End: 2021-08-23

## 2021-08-23 VITALS
DIASTOLIC BLOOD PRESSURE: 66 MMHG | HEART RATE: 70 BPM | BODY MASS INDEX: 25.9 KG/M2 | OXYGEN SATURATION: 97 % | HEIGHT: 67 IN | WEIGHT: 165 LBS | SYSTOLIC BLOOD PRESSURE: 122 MMHG

## 2021-08-23 DIAGNOSIS — I25.10 CORONARY ARTERY DISEASE INVOLVING NATIVE CORONARY ARTERY OF NATIVE HEART WITHOUT ANGINA PECTORIS: Primary | ICD-10-CM

## 2021-08-23 DIAGNOSIS — E78.2 MIXED HYPERLIPIDEMIA: ICD-10-CM

## 2021-08-23 DIAGNOSIS — I10 ESSENTIAL HYPERTENSION: ICD-10-CM

## 2021-08-23 DIAGNOSIS — R00.2 PALPITATIONS: ICD-10-CM

## 2021-08-23 PROCEDURE — 99214 OFFICE O/P EST MOD 30 MIN: CPT | Performed by: INTERNAL MEDICINE

## 2021-08-23 NOTE — PROGRESS NOTES
[facility]  Office visit  Patricia F Bancroft  1950  [phone]    VISIT DATE:  8/23/2021      PCP: Norma Rae PA-C  [address] [id number]    CC:  Chief Complaint   Patient presents with   • Coronary Artery Disease       PROBLEM LIST:  1. Coronary artery disease:  a. Status post drug-eluting stent to right coronary artery, May 2014, with no significant residual left system disease (EF 65%).  2. Essential Hypertension.  3. Dyslipidemia.  4. Seasonal allergies/allergic rhinitis.  5. History of remote tobacco use.  6. Hyperglycemia.  7. Surgical history:  a. Benign breast biopsy x2.  b. Hysterectomy with ovary removal.      ASSESSMENT:   Diagnosis Plan   1. Coronary artery disease involving native coronary artery of native heart without angina pectoris     2. Essential hypertension     3. Mixed hyperlipidemia         PLAN:  Coronary disease: Stable asymptomatic.  Continue aspirin 162 mg by mouth daily, high intensity statin therapy and afterload reduction with combination of lisinopril and metoprolol.    Hypertension: Goal less than 130/80.  Currently controlled.  Continue current medical therapy.    Hyperlipidemia: Goal LDL <70, continue rosuvastatin 20 mg p.o. daily.  Currently well controlled.    Cardiac murmur: Consistent with aortic sclerosis.  Does report recurrent episodes of strep pharyngitis as a child but was never diagnosed with rheumatic fever.  We will continue to trend murmur clinically and evaluate with transthoracic echo if murmur increases in severity.  Remote echo, sometime in the 1980s, with potential mitral valve prolapse, no findings on exam concerning for this underlying valve issue.     Subjective  Here for routine follow-up.  She does report intermittent palpitations when she is lying on her left side at night try to go to sleep.  Can be persistent butterfly sensations without associated symptoms..  Compliant with  "medical therapy.  Blood pressures running less than 130/80 mmHg.  Denies myalgias on statin therapy.  Denies chest pain.  She has been able to exercise on a regular basis and is watching her diet.  This is improved her hemoglobin A1c.  Lipid profile at goal.    PHYSICAL EXAMINATION:  Vitals:    08/23/21 1010   BP: 122/66   BP Location: Left arm   Patient Position: Sitting   Pulse: 70   SpO2: 97%   Weight: 74.8 kg (165 lb)   Height: 170.2 cm (67\")     General Appearance:    Alert, cooperative, no distress, appears stated age   Head:    Normocephalic, without obvious abnormality, atraumatic   Eyes:    conjunctiva/corneas clear   Nose:   Nares normal, septum midline, mucosa normal, no drainage   Throat:   Lips, teeth and gums normal   Neck:   Supple, symmetrical, trachea midline, no carotid    bruit or JVD   Lungs:     Clear to auscultation bilaterally, respirations unlabored   Chest Wall:    No tenderness or deformity    Heart:    Regular rate and rhythm, S1 and S2 normal, I/6 early peaking systolic murmur right upper sternal border, rub   or gallop, normal carotid impulse bilaterally without bruit.   Abdomen:     Soft, non-tender   Extremities:   Extremities normal, atraumatic, no cyanosis or edema   Pulses:   2+ and symmetric all extremities   Skin:   Skin color, texture, turgor normal, no rashes or lesions       Diagnostic Data:  Procedures  Lab Results   Component Value Date    CHLPL 172 04/27/2016    TRIG 115 08/06/2021    HDL 61 (H) 08/06/2021     Lab Results   Component Value Date    GLUCOSE 86 08/06/2021    BUN 12 08/06/2021    CREATININE 0.73 08/06/2021     08/06/2021    K 4.5 08/06/2021     08/06/2021    CO2 26.2 08/06/2021     Lab Results   Component Value Date    HGBA1C 5.8 06/01/2021     Lab Results   Component Value Date    WBC 8.00 03/25/2021    HGB 16.0 (H) 03/25/2021    HCT 48.9 (H) 03/25/2021     03/25/2021       Allergies  Allergies   Allergen Reactions   • Ciprofloxacin Myalgia "   • Dust Mite Extract Itching, Other (See Comments) and Cough     Household Dust   • Ibuprofen Hives       Current Medications    Current Outpatient Medications:   •  aspirin 162 MG EC tablet, Take 162 mg by mouth daily., Disp: , Rfl:   •  cetirizine (zyrTEC) 10 MG tablet, Take 10 mg by mouth Daily., Disp: , Rfl:   •  estradiol (CLIMARA) 0.025 MG/24HR patch, Place 1 patch on the skin as directed by provider 1 (One) Time Per Week., Disp: 12 patch, Rfl: 3  •  famotidine (Pepcid AC) 10 MG tablet, Pepcid AC 10 mg tablet  daily, Disp: , Rfl:   •  fluticasone (FLONASE) 50 MCG/ACT nasal spray, 2 sprays into the nostril(s) as directed by provider Daily., Disp: , Rfl:   •  lisinopril (PRINIVIL,ZESTRIL) 10 MG tablet, TAKE 1 TABLET DAILY, Disp: 90 tablet, Rfl: 1  •  metoprolol succinate XL (TOPROL-XL) 25 MG 24 hr tablet, TAKE 1 TABLET DAILY, Disp: 90 tablet, Rfl: 1  •  rosuvastatin (CRESTOR) 40 MG tablet, Take 1 tablet by mouth Daily., Disp: 90 tablet, Rfl: 3          ROS  Review of Systems   Cardiovascular: Negative for chest pain, dyspnea on exertion and irregular heartbeat.   Respiratory: Negative for cough.          SOCIAL HX  Social History     Socioeconomic History   • Marital status:      Spouse name: Not on file   • Number of children: Not on file   • Years of education: Not on file   • Highest education level: Not on file   Tobacco Use   • Smoking status: Former Smoker     Packs/day: 1.50     Years: 43.00     Pack years: 64.50     Types: Cigarettes     Start date: 1966     Quit date:      Years since quittin.6   • Smokeless tobacco: Never Used   Vaping Use   • Vaping Use: Never used   Substance and Sexual Activity   • Alcohol use: No     Comment: Quit    • Drug use: No     Types: Marijuana     Comment: as a teenager   • Sexual activity: Yes     Partners: Male     Birth control/protection: Post-menopausal       FAMILY HX  Family History   Problem Relation Age of Onset   • Cancer Mother          Lung cancer;  Dec 1996   • Lung cancer Mother    • Cancer Father         Primary site unknown;  1996   • Prostate cancer Father    • Heart disease Father    • Cancer Sister         Pancreatic Stage 1 diagnosed end of Dec 2017; chemo then surgery then chemo ending Oct 5 2018. Doing well so far.   • Pancreatic cancer Sister    • Heart disease Maternal Grandfather    • Diabetes Cousin    • Breast cancer Neg Hx    • Ovarian cancer Neg Hx    • Uterine cancer Neg Hx    • Colon cancer Neg Hx    • Melanoma Neg Hx              Ghanshyam Padron III, MD, FACC

## 2021-08-26 ENCOUNTER — HOSPITAL ENCOUNTER (OUTPATIENT)
Dept: MAMMOGRAPHY | Facility: HOSPITAL | Age: 71
Discharge: HOME | End: 2021-08-26
Admitting: PHYSICIAN ASSISTANT

## 2021-08-26 DIAGNOSIS — Z12.31 VISIT FOR SCREENING MAMMOGRAM: ICD-10-CM

## 2021-08-26 PROCEDURE — 77063 BREAST TOMOSYNTHESIS BI: CPT | Performed by: RADIOLOGY

## 2021-08-26 PROCEDURE — 77067 SCR MAMMO BI INCL CAD: CPT | Performed by: RADIOLOGY

## 2021-08-26 PROCEDURE — 77063 BREAST TOMOSYNTHESIS BI: CPT

## 2021-08-26 PROCEDURE — 77067 SCR MAMMO BI INCL CAD: CPT

## 2021-09-02 DIAGNOSIS — I10 ESSENTIAL HYPERTENSION: ICD-10-CM

## 2021-09-02 RX ORDER — METOPROLOL SUCCINATE 25 MG/1
25 TABLET, EXTENDED RELEASE ORAL DAILY
Qty: 90 TABLET | Refills: 1 | Status: SHIPPED | OUTPATIENT
Start: 2021-09-02 | End: 2022-02-03 | Stop reason: SDUPTHER

## 2021-09-22 ENCOUNTER — OFFICE VISIT (OUTPATIENT)
Dept: INTERNAL MEDICINE | Facility: CLINIC | Age: 71
End: 2021-09-22

## 2021-09-22 VITALS
SYSTOLIC BLOOD PRESSURE: 110 MMHG | BODY MASS INDEX: 25.58 KG/M2 | RESPIRATION RATE: 16 BRPM | HEART RATE: 74 BPM | WEIGHT: 163 LBS | HEIGHT: 67 IN | DIASTOLIC BLOOD PRESSURE: 70 MMHG | OXYGEN SATURATION: 95 % | TEMPERATURE: 97.1 F

## 2021-09-22 DIAGNOSIS — R10.32 LLQ PAIN: Primary | ICD-10-CM

## 2021-09-22 DIAGNOSIS — K59.09 CHRONIC CONSTIPATION: ICD-10-CM

## 2021-09-22 PROCEDURE — 99213 OFFICE O/P EST LOW 20 MIN: CPT | Performed by: PHYSICIAN ASSISTANT

## 2021-09-22 NOTE — PROGRESS NOTES
Chief Complaint   Patient presents with   • GI Problem     chronic discuss gastro, constant, discuss hernia location       Subjective       History of Present Illness     Patricia F Bancroft is a 71 y.o. female. She presents for follow up of abdominal pain and chronic constipation. Pt has had constipation for years. She also reports LLQ pain for quite some time, but both of these issues seem to be gradually worsening. She reports 2 BMs per week with use of Milk of Mg. She's not sure how long she would go without BM if she didn't use MoM, as she never lets it go this far. She has occasional sharp pains at LLQ which could be gas pains as this worsens when she hasn't had a BM in a few days. She denies N/V, blood in stool. Sometimes stool is darker, but no tarry stools or melena. She is drinking plenty of water. Her last colonoscopy was in . Cologuard negative in 2017. She had a CT abd/ pelvis in Dec 2020 which revealed small hiatal hernia, tiny umbilical hernia, large stool burden. She had a small L kidney cyst. Pt has hx of complete hysterectomy with WHITNEY oophorectomy.       The following portions of the patient's history were reviewed and updated as appropriate: allergies, current medications, past medical history, past social history, past surgical history and problem list.    Allergies   Allergen Reactions   • Ciprofloxacin Myalgia   • Dust Mite Extract Itching, Other (See Comments) and Cough     Household Dust, and Animal Dander   • Ibuprofen Hives     Social History     Tobacco Use   • Smoking status: Former Smoker     Packs/day: 1.50     Years: 43.00     Pack years: 64.50     Types: Cigarettes     Start date: 1966     Quit date:      Years since quittin.7   • Smokeless tobacco: Never Used   Substance Use Topics   • Alcohol use: No     Comment: Quit          Current Outpatient Medications:   •  aspirin 162 MG EC tablet, Take 162 mg by mouth daily., Disp: , Rfl:   •  cetirizine (zyrTEC) 10  MG tablet, Take 10 mg by mouth Daily., Disp: , Rfl:   •  estradiol (CLIMARA) 0.025 MG/24HR patch, Place 1 patch on the skin as directed by provider 1 (One) Time Per Week., Disp: 12 patch, Rfl: 3  •  famotidine (Pepcid AC) 10 MG tablet, Pepcid AC 10 mg tablet  daily, Disp: , Rfl:   •  fluticasone (FLONASE) 50 MCG/ACT nasal spray, 2 sprays into the nostril(s) as directed by provider Daily., Disp: , Rfl:   •  lisinopril (PRINIVIL,ZESTRIL) 10 MG tablet, TAKE 1 TABLET DAILY, Disp: 90 tablet, Rfl: 1  •  metoprolol succinate XL (TOPROL-XL) 25 MG 24 hr tablet, Take 1 tablet by mouth Daily., Disp: 90 tablet, Rfl: 1  •  rosuvastatin (CRESTOR) 40 MG tablet, Take 1 tablet by mouth Daily., Disp: 90 tablet, Rfl: 3    Review of Systems   Constitutional: Negative for chills, fatigue and fever.   HENT: Negative for congestion, sore throat and trouble swallowing.    Respiratory: Negative for cough and shortness of breath.    Cardiovascular: Negative for chest pain and palpitations.   Gastrointestinal: Positive for abdominal pain and constipation. Negative for blood in stool, diarrhea, nausea and vomiting.   Genitourinary: Negative for dysuria and hematuria.   Skin: Negative for rash.   Neurological: Negative for dizziness, weakness and headache.       Objective   Vitals:    09/22/21 0829   BP: 110/70   Pulse: 74   Resp: 16   Temp: 97.1 °F (36.2 °C)   SpO2: 95%     Body mass index is 25.53 kg/m².    Physical Exam  Constitutional:       Appearance: Normal appearance. She is well-developed.   HENT:      Head: Normocephalic and atraumatic.      Mouth/Throat:      Mouth: Mucous membranes are moist.      Pharynx: Oropharynx is clear.   Eyes:      Conjunctiva/sclera: Conjunctivae normal.   Neck:      Thyroid: No thyromegaly.   Cardiovascular:      Rate and Rhythm: Normal rate and regular rhythm.      Heart sounds: No murmur heard.     Pulmonary:      Effort: Pulmonary effort is normal.      Breath sounds: Normal breath sounds. No wheezing  or rales.   Abdominal:      Palpations: Abdomen is soft. There is no mass.      Tenderness: There is abdominal tenderness in the left lower quadrant. There is no rebound.      Comments: +minimal TTP at LLQ only with deep palpation   Musculoskeletal:      Cervical back: Neck supple.   Lymphadenopathy:      Cervical: No cervical adenopathy.   Skin:     Findings: No rash.   Psychiatric:         Behavior: Behavior normal.               Assessment/Plan   Diagnoses and all orders for this visit:    1. LLQ pain (Primary)  -     Ambulatory Referral to Gastroenterology    2. Chronic constipation  -     Ambulatory Referral to Gastroenterology      Pt would like to see GI given worsening symptoms. She is open to having a colonoscopy but wants to see GI first as she had a reaction to the bowel prep in 2006 at her last colonoscopy. I have strongly encouraged her to get a colonoscopy once she sees GI.   She may continue MoM until she sees GI for further discussion and management.              Return for Next scheduled follow up.

## 2021-09-24 ENCOUNTER — TELEPHONE (OUTPATIENT)
Dept: CARDIOLOGY | Facility: CLINIC | Age: 71
End: 2021-09-24

## 2021-09-24 NOTE — TELEPHONE ENCOUNTER
----- Message from Ghanshyam Padron III, MD sent at 9/24/2021  9:36 AM EDT -----  No concerning heart rhythm issues noted on your monitor.

## 2021-10-09 ENCOUNTER — FLU SHOT (OUTPATIENT)
Dept: INTERNAL MEDICINE | Facility: CLINIC | Age: 71
End: 2021-10-09

## 2021-10-09 DIAGNOSIS — Z23 NEEDS FLU SHOT: Primary | ICD-10-CM

## 2021-10-09 PROCEDURE — G0008 ADMIN INFLUENZA VIRUS VAC: HCPCS | Performed by: NURSE PRACTITIONER

## 2021-10-09 PROCEDURE — 90662 IIV NO PRSV INCREASED AG IM: CPT | Performed by: NURSE PRACTITIONER

## 2021-10-09 NOTE — PROGRESS NOTES
Subjective     Patricia F Bancroft is a 71 y.o. female here today for No chief complaint on file.        I have reviewed the following portions of the patient's history and confirmed they are accurate:     I have personally completed the patient's review of systems.  HPI    Review of Systems    Current Outpatient Medications on File Prior to Visit   Medication Sig   • aspirin 162 MG EC tablet Take 162 mg by mouth daily.   • cetirizine (zyrTEC) 10 MG tablet Take 10 mg by mouth Daily.   • estradiol (CLIMARA) 0.025 MG/24HR patch Place 1 patch on the skin as directed by provider 1 (One) Time Per Week.   • famotidine (Pepcid AC) 10 MG tablet Pepcid AC 10 mg tablet   daily   • fluticasone (FLONASE) 50 MCG/ACT nasal spray 2 sprays into the nostril(s) as directed by provider Daily.   • lisinopril (PRINIVIL,ZESTRIL) 10 MG tablet TAKE 1 TABLET DAILY   • metoprolol succinate XL (TOPROL-XL) 25 MG 24 hr tablet Take 1 tablet by mouth Daily.   • rosuvastatin (CRESTOR) 40 MG tablet Take 1 tablet by mouth Daily.     No current facility-administered medications on file prior to visit.       Objective   There were no vitals filed for this visit.  There is no height or weight on file to calculate BMI.    Virtual Visit Physical Exam    Assessment/Plan   Problems Addressed this Visit     None      Diagnoses    None.            Current Outpatient Medications:   •  aspirin 162 MG EC tablet, Take 162 mg by mouth daily., Disp: , Rfl:   •  cetirizine (zyrTEC) 10 MG tablet, Take 10 mg by mouth Daily., Disp: , Rfl:   •  estradiol (CLIMARA) 0.025 MG/24HR patch, Place 1 patch on the skin as directed by provider 1 (One) Time Per Week., Disp: 12 patch, Rfl: 3  •  famotidine (Pepcid AC) 10 MG tablet, Pepcid AC 10 mg tablet  daily, Disp: , Rfl:   •  fluticasone (FLONASE) 50 MCG/ACT nasal spray, 2 sprays into the nostril(s) as directed by provider Daily., Disp: , Rfl:   •  lisinopril (PRINIVIL,ZESTRIL) 10 MG tablet, TAKE 1 TABLET DAILY, Disp: 90  tablet, Rfl: 1  •  metoprolol succinate XL (TOPROL-XL) 25 MG 24 hr tablet, Take 1 tablet by mouth Daily., Disp: 90 tablet, Rfl: 1  •  rosuvastatin (CRESTOR) 40 MG tablet, Take 1 tablet by mouth Daily., Disp: 90 tablet, Rfl: 3       Plan of care reviewed with the patient at the conclusion of today's visit.  Education was provided regarding diagnosis, management, and any prescribed or recommended OTC medications.  Patient verbalized understanding of and agreement with management plan.     No follow-ups on file.      ROSA ELENA Bajwa

## 2021-10-27 RX ORDER — FAMOTIDINE 10 MG
TABLET ORAL
Status: CANCELLED | OUTPATIENT
Start: 2021-10-27

## 2021-10-28 ENCOUNTER — OFFICE VISIT (OUTPATIENT)
Dept: GYNECOLOGIC ONCOLOGY | Facility: CLINIC | Age: 71
End: 2021-10-28

## 2021-10-28 VITALS
TEMPERATURE: 97.5 F | RESPIRATION RATE: 17 BRPM | WEIGHT: 162.6 LBS | BODY MASS INDEX: 25.52 KG/M2 | SYSTOLIC BLOOD PRESSURE: 135 MMHG | DIASTOLIC BLOOD PRESSURE: 76 MMHG | HEIGHT: 67 IN | HEART RATE: 73 BPM

## 2021-10-28 DIAGNOSIS — N90.3 VULVAR DYSPLASIA: Primary | ICD-10-CM

## 2021-10-28 DIAGNOSIS — R10.32 LLQ PAIN: ICD-10-CM

## 2021-10-28 DIAGNOSIS — K59.00 CONSTIPATION, UNSPECIFIED CONSTIPATION TYPE: ICD-10-CM

## 2021-10-28 PROCEDURE — 99213 OFFICE O/P EST LOW 20 MIN: CPT | Performed by: OBSTETRICS & GYNECOLOGY

## 2021-10-28 NOTE — PROGRESS NOTES
"     Follow Up Office Visit      Patient Name: Patricia F Bancroft  : 1950   MRN: [id number]     Chief Complaint:    Chief Complaint   Patient presents with   • Follow-up     Vulvar dysplasia no complaints       History of Present Illness: Patricia F Bancroft is a 71 y.o. female who is here today to follow up with Gynecologic Oncology for surveillance visit. She is status post simple partial vulvectomy on 2021 for symptomatic vulvar mass with GABINO 3.    Today, she is overall doing well. She believes she has healed well from her surgery. Denies noticing any new vaginal lesions. She has noted mild LLQ abdominal discomfort over the last few weeks. She does have a history of chronic constipation and is unsure if it could be related to that. She completed an colonoscopy yesterday in investigation of this pain and was told it was normal. She continues to have discomfort even after her colonoscopy prep. She denies any recent fevers, nausea/vomiting, changes in bowel or bladder habits, abdominal bloating. She denies vaginal bleeding and discharge. She is tolerating a regular diet and endorses a good appetite. Denies any CP, SOB, lightheadedness or dizziness. Of note, she also mentions that she had a history of rectocele repair with mesh with her hysterectomy several years ago, though she is concerned she has seen \"some vaginal tissue come out\" after standing in the shower or after straining to have a bowel movement.     Problem   Vulvar Dysplasia    Referred by Dr. Fabi Simmons    3/11/2021: Vulvar biopsy with GABINO 3 with positive deep and peripheral margins.  The possibility of invasion cannot be excluded.  2021: Vulvectomy with HSIL. Negative for cancer. All margins free of HSIL.       - Last mammogram 2021: IMPRESSION: No findings suspicious for malignancy. BI-RADS CATEGORY:  1, NEGATIVE. RECOMMENDATION: Yearly mammogram, yearly clinical breast exam, and encourage self breast awareness.  - Last " Resumed care of patient. Report completed at bedside and patient has no new complaints at this time.    "colonoscopy 10/27/2021 - reportedly normal (do not have report)      I have reviewed and the following portions of the patient's history were updated as appropriate: past family history, past medical history, past social history, past surgical history, past obstetrics/gynecologic history and problem list.    Medications: The current medication list was reviewed with the patient and updated in the EMR this date per the Medical Assistant. Medication dosages and frequencies were confirmed to be accurate.      Allergies:   Allergies   Allergen Reactions   • Ciprofloxacin Myalgia   • Dust Mite Extract Itching, Other (See Comments) and Cough     Household Dust, and Animal Dander   • Ibuprofen Hives       Subjective      Review of Systems:   Review of Systems   Constitutional: Negative for appetite change, chills, fatigue, fever and unexpected weight change.   Respiratory: Negative for cough, shortness of breath and wheezing.    Cardiovascular: Negative for chest pain, palpitations and leg swelling.   Gastrointestinal: Positive for abdominal pain (LLQ) and constipation. Negative for abdominal distention, diarrhea, nausea and vomiting.   Genitourinary: Negative for dyspareunia, dysuria, frequency, hematuria, pelvic pain, urgency and vaginal bleeding.   Musculoskeletal: Negative for arthralgias, back pain and myalgias.   Neurological: Negative for dizziness, light-headedness, numbness and headaches.   Hematological: Negative for adenopathy.   Psychiatric/Behavioral: Negative for dysphoric mood. The patient is not nervous/anxious.         Objective     Physical Exam:  Vital Signs:   Vitals:    10/28/21 1301 10/28/21 1306   BP: 163/68 135/76   Pulse: 73    Resp: 17    Temp: 97.5 °F (36.4 °C)    TempSrc: Temporal    Weight: 73.8 kg (162 lb 9.6 oz)    Height: 170.2 cm (67.01\")    PainSc: 0-No pain      BMI: Body mass index is 25.46 kg/m².     ECOG score: 0           PHQ-2 Depression Screening  Little interest or pleasure in " doing things? 0   Feeling down, depressed, or hopeless? 0   PHQ-2 Total Score 0         Physical Exam  Vitals and nursing note reviewed. Exam conducted with a chaperone present.   Constitutional:       General: She is not in acute distress.     Appearance: Normal appearance. She is well-developed. She is not diaphoretic.   HENT:      Head: Normocephalic and atraumatic.      Right Ear: External ear normal.      Left Ear: External ear normal.   Eyes:      General: No scleral icterus.        Right eye: No discharge.         Left eye: No discharge.      Conjunctiva/sclera: Conjunctivae normal.   Neck:      Thyroid: No thyromegaly.   Cardiovascular:      Rate and Rhythm: Normal rate and regular rhythm.      Heart sounds: No murmur heard.      Pulmonary:      Effort: Pulmonary effort is normal. No respiratory distress.      Breath sounds: Normal breath sounds. No wheezing.   Abdominal:      General: Bowel sounds are normal. There is no distension.      Palpations: Abdomen is soft. There is no mass.      Tenderness: There is no abdominal tenderness. There is no guarding.      Hernia: No hernia is present.   Genitourinary:     Comments: External genitalia normal. Vagina without discharge. Able to palpate smooth small round 0.5cm nodule at left aspect of vaginal apex. No obvious lesion visible on speculum exam. Parametria smooth. Cervix, uterus, adnexa surgically absent. Rectovaginal exam deferred.   Musculoskeletal:         General: No swelling, tenderness, deformity or signs of injury.      Cervical back: Normal range of motion and neck supple.      Right lower leg: No edema.      Left lower leg: No edema.   Lymphadenopathy:      Cervical: No cervical adenopathy.   Skin:     General: Skin is warm and dry.      Findings: No erythema, lesion or rash.   Neurological:      General: No focal deficit present.      Mental Status: She is alert and oriented to person, place, and time. Mental status is at baseline.   Psychiatric:     "     Behavior: Behavior normal.         Thought Content: Thought content normal.          Assessment / Plan    Patricia F Bancroft is a 71 y.o. female who is status post simple partial vulvectomy on 4/12/2021 for symptomatic vulvar mass with GABINO III. Her post operative course has been unremarkable and continues to be recovering well. She is here today for surveillance visit. No evidence of recurrence on today's exam. Reassurance provided. Will continue surveillance with exams every 6 months.    History of hysterectomy for benign indications: No history of DANTE II or greater. Paps not currently indicated.    History of rectocele s/p repair with mesh: Concerned for possible recurrence of vaginal prolapse as she has noted vaginal tissue while standing in the shower or after straining. No evidence of obvious prolapse on exam. Able to feel small \"knot\" at left vaginal apex (10 o'clock), most likely anchor from prior mesh placement, though no evidence of erosion. Pathology from hysterectomy from 2004 again reviewed and without evidence of malignancy.    Chronic constipation: Encouraged continued use of miralax    LLQ pain: Present for a few weeks, history of chronic constipation, though had colonoscopy yesterday which was reportedly normal. She still feels discomfort despite clean out for colonoscopy. No concerns for pelvic mass on exam today. Discussed following up with PCP if pain continues.     Pain assessment was performed today as a part of patient’s care.  For patients with pain related to surgery, gynecologic malignancy or cancer treatment, the plan is as noted in the assessment/plan.  For patients with pain not related to these issues, they are to seek any further needed care from a more appropriate provider, such as PCP.      Diagnoses and all orders for this visit:    1. Vulvar dysplasia (Primary)    2. Constipation, unspecified constipation type    3. LLQ pain         Follow Up: 6 months for surveillance of " vulvar dysplasia    Lizbeth Chavira MD  Gynecologic Oncology Resident    Patient was seen and examined with Dr. Chavira,  resident, who performed portions of the examination and documentation for this patient's care under my direct supervision.  I agree with the above documentation and plan.    IVANA Hill MD  Gynecologic Oncology     Please note that portions of this note may have been completed with a voice recognition program.

## 2021-11-04 RX ORDER — FAMOTIDINE 20 MG/1
20 TABLET, FILM COATED ORAL 2 TIMES DAILY
Qty: 180 TABLET | Refills: 3 | Status: SHIPPED | OUTPATIENT
Start: 2021-11-04 | End: 2022-11-07 | Stop reason: SDUPTHER

## 2021-11-15 NOTE — ADDENDUM NOTE
2nd attempt: Writer contacted patient to schedule surgery. Patient was unavailable,voicemail left for bridgette back.    Addended by: LINSEY GREER on: 12/7/2020 10:23 AM     Modules accepted: Orders

## 2021-11-18 DIAGNOSIS — I10 ESSENTIAL HYPERTENSION: ICD-10-CM

## 2021-11-18 RX ORDER — LISINOPRIL 10 MG/1
TABLET ORAL
Qty: 90 TABLET | Refills: 1 | Status: SHIPPED | OUTPATIENT
Start: 2021-11-18 | End: 2022-06-29 | Stop reason: SDUPTHER

## 2021-12-06 ENCOUNTER — LAB (OUTPATIENT)
Dept: LAB | Facility: HOSPITAL | Age: 71
End: 2021-12-06

## 2021-12-06 ENCOUNTER — OFFICE VISIT (OUTPATIENT)
Dept: INTERNAL MEDICINE | Facility: CLINIC | Age: 71
End: 2021-12-06

## 2021-12-06 VITALS
TEMPERATURE: 97.3 F | DIASTOLIC BLOOD PRESSURE: 78 MMHG | WEIGHT: 164.2 LBS | HEART RATE: 64 BPM | SYSTOLIC BLOOD PRESSURE: 126 MMHG | HEIGHT: 67 IN | BODY MASS INDEX: 25.77 KG/M2 | OXYGEN SATURATION: 96 %

## 2021-12-06 DIAGNOSIS — Z00.00 ENCOUNTER FOR SUBSEQUENT ANNUAL WELLNESS VISIT (AWV) IN MEDICARE PATIENT: Primary | ICD-10-CM

## 2021-12-06 DIAGNOSIS — E78.2 MIXED HYPERLIPIDEMIA: ICD-10-CM

## 2021-12-06 DIAGNOSIS — E11.65 TYPE 2 DIABETES MELLITUS WITH HYPERGLYCEMIA, WITHOUT LONG-TERM CURRENT USE OF INSULIN (HCC): Primary | ICD-10-CM

## 2021-12-06 DIAGNOSIS — I25.10 CORONARY ARTERY DISEASE INVOLVING NATIVE CORONARY ARTERY OF NATIVE HEART WITHOUT ANGINA PECTORIS: ICD-10-CM

## 2021-12-06 DIAGNOSIS — I10 ESSENTIAL HYPERTENSION: ICD-10-CM

## 2021-12-06 DIAGNOSIS — E11.65 TYPE 2 DIABETES MELLITUS WITH HYPERGLYCEMIA, WITHOUT LONG-TERM CURRENT USE OF INSULIN (HCC): ICD-10-CM

## 2021-12-06 LAB — HBA1C MFR BLD: 6.1 % (ref 4.8–5.6)

## 2021-12-06 PROCEDURE — 83036 HEMOGLOBIN GLYCOSYLATED A1C: CPT

## 2021-12-06 PROCEDURE — 36415 COLL VENOUS BLD VENIPUNCTURE: CPT

## 2021-12-06 PROCEDURE — 1160F RVW MEDS BY RX/DR IN RCRD: CPT | Performed by: PHYSICIAN ASSISTANT

## 2021-12-06 PROCEDURE — 1170F FXNL STATUS ASSESSED: CPT | Performed by: PHYSICIAN ASSISTANT

## 2021-12-06 PROCEDURE — G0439 PPPS, SUBSEQ VISIT: HCPCS | Performed by: PHYSICIAN ASSISTANT

## 2021-12-06 NOTE — PROGRESS NOTES
The ABCs of the Annual Wellness Visit  Subsequent Medicare Wellness Visit    Chief Complaint   Patient presents with   • Medicare Wellness-subsequent      Subjective    History of Present Illness:  Patricia F Bancroft is a 71 y.o. female who presents for a Subsequent Medicare Wellness Visit.    The following portions of the patient's history were reviewed and   updated as appropriate: allergies, current medications, past family history, past medical history, past social history, past surgical history and problem list.    Compared to one year ago, the patient feels her physical   health is the same.    Compared to one year ago, the patient feels her mental   health is the same.    Recent Hospitalizations:  She was not admitted to the hospital during the last year.       Current Medical Providers:  Patient Care Team:  Norma Rae PA-C as PCP - General (Physician Assistant)  Nitish Carmona MD as Consulting Physician (Colon and Rectal Surgery)  Dennis Blackwood DPM as Consulting Physician (Podiatry)    Outpatient Medications Prior to Visit   Medication Sig Dispense Refill   • aspirin 162 MG EC tablet Take 162 mg by mouth daily.     • cetirizine (zyrTEC) 10 MG tablet Take 10 mg by mouth Daily.     • estradiol (CLIMARA) 0.025 MG/24HR patch Place 1 patch on the skin as directed by provider 1 (One) Time Per Week. 12 patch 3   • famotidine (Pepcid) 20 MG tablet Take 1 tablet by mouth 2 (Two) Times a Day. 180 tablet 3   • fluticasone (FLONASE) 50 MCG/ACT nasal spray 2 sprays into the nostril(s) as directed by provider Daily.     • lisinopril (PRINIVIL,ZESTRIL) 10 MG tablet TAKE 1 TABLET DAILY 90 tablet 1   • metoprolol succinate XL (TOPROL-XL) 25 MG 24 hr tablet Take 1 tablet by mouth Daily. 90 tablet 1   • Polyethylene Glycol 3350 (MIRALAX PO) Take  by mouth.     • rosuvastatin (CRESTOR) 40 MG tablet Take 1 tablet by mouth Daily. 90 tablet 3     No facility-administered medications prior to visit.       No opioid  medication identified on active medication list. I have reviewed chart for other potential  high risk medication/s and harmful drug interactions in the elderly.          Aspirin is on active medication list. Aspirin use is indicated based on review of current medical condition/s. Pros and cons of this therapy have been discussed today. Benefits of this medication outweigh potential harm.  Patient has been encouraged to continue taking this medication.  .      Patient Active Problem List   Diagnosis   • Coronary artery disease involving native coronary artery of native heart without angina pectoris   • Essential hypertension   • Mixed hyperlipidemia   • Type 2 diabetes mellitus with hyperglycemia (HCC)   • Allergic rhinitis   • Chronic idiopathic constipation   • Gastroesophageal reflux disease   • Abdominal bloating   • History of tobacco use   • Allergic rhinitis due to animal dander   • Combined form of senile cataract   • Myopia   • Non-toxic uninodular goiter   • Posterior vitreous detachment   • Presbyopia   • Regular astigmatism   • Vulvar dysplasia     Advance Care Planning  Advance Directive is not on file.  ACP discussion was held with the patient during this visit. Patient does not have an advance directive, information provided.    Review of Systems   Constitutional: Negative for appetite change, chills and fever.   HENT: Negative for congestion and sore throat.    Respiratory: Negative for cough, shortness of breath and wheezing.    Cardiovascular: Negative for chest pain and palpitations.   Gastrointestinal: Positive for abdominal pain. Negative for diarrhea, nausea and vomiting.   Genitourinary: Negative for dysuria.   Musculoskeletal: Negative for neck pain.   Skin: Negative for rash.   Neurological: Negative for dizziness.   Psychiatric/Behavioral: The patient is not nervous/anxious.         Objective    Vitals:    12/06/21 0822   BP: 126/78   Pulse: 64   Temp: 97.3 °F (36.3 °C)   SpO2: 96%   Weight:  "74.5 kg (164 lb 3.2 oz)   Height: 170.2 cm (67.01\")     BMI Readings from Last 1 Encounters:   21 25.71 kg/m²   BMI is above normal parameters. Recommendations include: exercise counseling    Does the patient have evidence of cognitive impairment? No     Finger Rub Hearing{Test (right ear):passed  Finger Rub Hearing{Test (left ear):passed        Physical Exam  Constitutional:       Appearance: Normal appearance. She is well-developed.   HENT:      Head: Normocephalic and atraumatic.      Right Ear: Tympanic membrane, ear canal and external ear normal.      Left Ear: Tympanic membrane, ear canal and external ear normal.      Nose: Nose normal.      Mouth/Throat:      Mouth: Mucous membranes are moist.      Pharynx: Oropharynx is clear.   Eyes:      Conjunctiva/sclera: Conjunctivae normal.   Neck:      Thyroid: No thyromegaly.      Vascular: No carotid bruit.   Cardiovascular:      Rate and Rhythm: Normal rate and regular rhythm.      Heart sounds: No murmur heard.      Pulmonary:      Effort: Pulmonary effort is normal.      Breath sounds: Normal breath sounds. No wheezing or rales.   Abdominal:      Palpations: Abdomen is soft. There is no mass.      Tenderness: There is no abdominal tenderness.   Musculoskeletal:      Cervical back: Neck supple.   Lymphadenopathy:      Cervical: No cervical adenopathy.   Skin:     Findings: No rash.   Psychiatric:         Behavior: Behavior normal.                 HEALTH RISK ASSESSMENT    Smoking Status:  Social History     Tobacco Use   Smoking Status Former Smoker   • Packs/day: 1.50   • Years: 43.00   • Pack years: 64.50   • Types: Cigarettes   • Start date: 1966   • Quit date:    • Years since quittin.9   Smokeless Tobacco Never Used     Alcohol Consumption:  Social History     Substance and Sexual Activity   Alcohol Use No    Comment: Quit      Fall Risk Screen:    STEADI Fall Risk Assessment was completed, and patient is at LOW risk for " falls.Assessment completed on:12/6/2021    Depression Screening:  PHQ-2/PHQ-9 Depression Screening 10/28/2021   Little interest or pleasure in doing things 0   Feeling down, depressed, or hopeless 0   Trouble falling or staying asleep, or sleeping too much -   Feeling tired or having little energy -   Poor appetite or overeating -   Feeling bad about yourself - or that you are a failure or have let yourself or your family down -   Trouble concentrating on things, such as reading the newspaper or watching television -   Moving or speaking so slowly that other people could have noticed. Or the opposite - being so fidgety or restless that you have been moving around a lot more than usual -   Thoughts that you would be better off dead, or of hurting yourself in some way -   Total Score 0   If you checked off any problems, how difficult have these problems made it for you to do your work, take care of things at home, or get along with other people? -       Health Habits and Functional and Cognitive Screening:  Functional & Cognitive Status 12/6/2021   Do you have difficulty preparing food and eating? No   Do you have difficulty bathing yourself, getting dressed or grooming yourself? No   Do you have difficulty using the toilet? No   Do you have difficulty moving around from place to place? No   Do you have trouble with steps or getting out of a bed or a chair? No   Current Diet Well Balanced Diet   Dental Exam Up to date   Eye Exam Up to date   Exercise (times per week) 3 times per week   Current Exercises Include Walking   Current Exercise Activities Include -   Do you need help using the phone?  No   Are you deaf or do you have serious difficulty hearing?  Yes   Do you need help with transportation? No   Do you need help shopping? No   Do you need help preparing meals?  No   Do you need help with housework?  No   Do you need help with laundry? No   Do you need help taking your medications? No   Do you need help  managing money? No   Do you ever drive or ride in a car without wearing a seat belt? No   Have you felt unusual stress, anger or loneliness in the last month? No   Who do you live with? Spouse   If you need help, do you have trouble finding someone available to you? No   Have you been bothered in the last four weeks by sexual problems? No   Do you have difficulty concentrating, remembering or making decisions? No       Age-appropriate Screening Schedule:  Refer to the list below for future screening recommendations based on patient's age, sex and/or medical conditions. Orders for these recommended tests are listed in the plan section. The patient has been provided with a written plan.    Health Maintenance   Topic Date Due   • DIABETIC FOOT EXAM  Never done   • DXA SCAN  03/07/2021   • HEMOGLOBIN A1C  12/01/2021   • DIABETIC EYE EXAM  05/13/2022   • URINE MICROALBUMIN  06/01/2022   • TDAP/TD VACCINES (2 - Td or Tdap) 08/01/2022   • LIPID PANEL  08/06/2022   • MAMMOGRAM  08/26/2023   • INFLUENZA VACCINE  Completed   • PAP SMEAR  Discontinued   • ZOSTER VACCINE  Discontinued              Assessment/Plan   CMS Preventative Services Quick Reference  Risk Factors Identified During Encounter  Cardiovascular Disease  The above risks/problems have been discussed with the patient.  Follow up actions/plans if indicated are seen below in the Assessment/Plan Section.  Pertinent information has been shared with the patient in the After Visit Summary.    Diagnoses and all orders for this visit:    1. Encounter for subsequent annual wellness visit (AWV) in Medicare patient (Primary)    2. Coronary artery disease involving native coronary artery of native heart without angina pectoris    3. Essential hypertension    4. Mixed hyperlipidemia    5. Type 2 diabetes mellitus with hyperglycemia, without long-term current use of insulin ([facility])  -     POC Glycosylated Hemoglobin (Hb A1C)        Follow Up:   Return in about 6 months (around  6/6/2022) for Follow up.     An After Visit Summary and PPPS were made available to the patient.

## 2021-12-08 ENCOUNTER — TELEPHONE (OUTPATIENT)
Dept: INTERNAL MEDICINE | Facility: CLINIC | Age: 71
End: 2021-12-08

## 2021-12-08 NOTE — TELEPHONE ENCOUNTER
Caller: Bancroft, Patricia F    Relationship to patient: Self    Best call back number:[phone]    HUB READ THE FOLLOWING MESSAGE TO THE PATIENT    HUB OK TO READ: Please call pt and let her know she remains in prediabetic range with A1c of 6.1. I have also updated her chart to reflect prediabetes.         PATIENT ACKNOWLEDGED SHE UNDERSTOOD MESSAGE AND NO FURTHER QUESTIONS

## 2021-12-08 NOTE — TELEPHONE ENCOUNTER
Tried to call patient no answer LVM to CB.    HUB OK TO READ: Please call pt and let her know she remains in prediabetic range with A1c of 6.1. I have also updated her chart to reflect prediabetes.

## 2021-12-08 NOTE — TELEPHONE ENCOUNTER
HUB read message to patient. PATIENT ACKNOWLEDGED SHE UNDERSTOOD MESSAGE AND NO FURTHER QUESTIONS

## 2021-12-08 NOTE — TELEPHONE ENCOUNTER
Please call pt and let her know she remains in prediabetic range with A1c of 6.1. I have also updated her chart to reflect prediabetes.

## 2021-12-22 DIAGNOSIS — Z78.0 POST-MENOPAUSAL: ICD-10-CM

## 2022-02-03 DIAGNOSIS — I10 ESSENTIAL HYPERTENSION: ICD-10-CM

## 2022-02-03 RX ORDER — METOPROLOL SUCCINATE 25 MG/1
25 TABLET, EXTENDED RELEASE ORAL DAILY
Qty: 90 TABLET | Refills: 1 | Status: SHIPPED | OUTPATIENT
Start: 2022-02-03 | End: 2022-08-24 | Stop reason: SDUPTHER

## 2022-02-15 DIAGNOSIS — E78.2 MIXED HYPERLIPIDEMIA: ICD-10-CM

## 2022-02-15 RX ORDER — ROSUVASTATIN CALCIUM 40 MG/1
40 TABLET, COATED ORAL DAILY
Qty: 90 TABLET | Refills: 3 | Status: SHIPPED | OUTPATIENT
Start: 2022-02-15 | End: 2023-01-17 | Stop reason: SDUPTHER

## 2022-02-15 NOTE — TELEPHONE ENCOUNTER
Caller: Bancroft, Patricia F    Relationship: Self    Best call back number: [phone]    Requested Prescriptions:   Requested Prescriptions     Pending Prescriptions Disp Refills   • rosuvastatin (CRESTOR) 40 MG tablet 90 tablet 3     Sig: Take 1 tablet by mouth Daily.        Pharmacy where request should be sent: [address] [phone] [facility] [phone] [facility]     Additional details provided by patient: patient has a week left    Does the patient have less than a 3 day supply:  [] Yes  [x] No    Melody Turner Rep   02/15/22 08:36 EST

## 2022-03-30 ENCOUNTER — TELEPHONE (OUTPATIENT)
Dept: INTERNAL MEDICINE | Facility: CLINIC | Age: 72
End: 2022-03-30

## 2022-03-30 NOTE — TELEPHONE ENCOUNTER
Caller: Bancroft, Patricia F    Relationship: Self    Best call back number: [phone] OR RESPOND ON [facility]    What is the best time to reach you: ANYTIME     Who are you requesting to speak with (clinical staff, provider,  specific staff member): CLINICAL STAFF        What was the call regarding: THE PATIENT WOULD LIKE TO KNOW IF THE OFFICE IS GOING TO BE GIVING THE SECOND PHIZER BOOSTER VACCINE FOR COVID-19     Do you require a callback: YES

## 2022-03-30 NOTE — TELEPHONE ENCOUNTER
HUB/PAR please advise:  We are administrating the COVID PFIZER vaccines, once patient is due they can contact the office for a nurse visit.     [employer] message sent.

## 2022-03-30 NOTE — TELEPHONE ENCOUNTER
Hub staff attempted to follow warm transfer process and was unsuccessful     Caller: Bancroft, Patricia F    Relationship to patient: Self    Best call back number: [phone]    Patient is needing: PATIENT WAS ATTEMPTING TO RETURN CALL BACK AND THE PERSON WHO CALLED HER HAD LEFT FOR THE DAY. PLEASE RETURN CALL ABOUT HER COVID VACCINE AS SOON AS POSSIBLE.

## 2022-03-31 ENCOUNTER — IMMUNIZATION (OUTPATIENT)
Dept: INTERNAL MEDICINE | Facility: CLINIC | Age: 72
End: 2022-03-31

## 2022-03-31 DIAGNOSIS — Z23 COVID-19 VACCINE ADMINISTERED: Primary | ICD-10-CM

## 2022-03-31 PROCEDURE — 0004A COVID-19 (PFIZER): CPT | Performed by: PHYSICIAN ASSISTANT

## 2022-03-31 PROCEDURE — 91300 COVID-19 (PFIZER): CPT | Performed by: PHYSICIAN ASSISTANT

## 2022-04-27 NOTE — PROGRESS NOTES
Follow Up Office Visit      Patient Name: Patricia F Bancroft  : 1950   MRN: [id number]     Chief Complaint:  History of vulvar dysplasia    History of Present Illness: Patricia F Bancroft is a 71 y.o. female who is here today to follow up with Gynecologic Oncology for surveillance visit. She is status post simple partial vulvectomy on 2021 for symptomatic vulvar mass with GABINO 3.    Today, she is overall doing well. She denies any new vulvar or vaginal lesions. Does have rare itching and discomfort. She does not take anything for this. Continues to have issues with her rectocele. Tried a pessary previously before her hysterectomy. Is not interested in restarting this unless absolutely necessary.     No problems updated.    I have reviewed and the following portions of the patient's history were updated as appropriate: past family history, past medical history, past social history, past surgical history, past obstetrics/gynecologic history and problem list.    Medications: The current medication list was reviewed with the patient and updated in the EMR this date per the Medical Assistant. Medication dosages and frequencies were confirmed to be accurate.      Allergies:   Allergies   Allergen Reactions   • Ciprofloxacin Myalgia   • Dust Mite Extract Itching, Other (See Comments) and Cough     Household Dust, and Animal Dander   • Ibuprofen Hives       Subjective      Review of Systems:   Review of Systems   Constitutional: Negative for appetite change, chills, fatigue, fever and unexpected weight change.   Respiratory: Negative for cough, shortness of breath and wheezing.    Cardiovascular: Negative for chest pain, palpitations and leg swelling.   Gastrointestinal: Positive for constipation. Negative for abdominal distention, abdominal pain, diarrhea, nausea and vomiting.   Genitourinary: Negative for dyspareunia, dysuria, frequency, hematuria, pelvic pain, urgency and vaginal bleeding.  "  Musculoskeletal: Negative for arthralgias, back pain and myalgias.   Neurological: Negative for dizziness, light-headedness, numbness and headaches.   Hematological: Negative for adenopathy.   Psychiatric/Behavioral: Negative for dysphoric mood. The patient is not nervous/anxious.         Objective     Physical Exam:  Vital Signs:   Vitals:    04/28/22 0957   BP: 143/67  Comment: KINGA   Pulse: 72   Resp: 16   Temp: 96.8 °F (36 °C)   TempSrc: Infrared   SpO2: 96%  Comment: RA   Weight: 76.7 kg (169 lb)   Height: 170.2 cm (67\")   PainSc: 0-No pain     BMI: Body mass index is 26.47 kg/m².     ECOG score: 0           PHQ-2 Depression Screening  Little interest or pleasure in doing things?     Feeling down, depressed, or hopeless?     PHQ-2 Total Score           Physical Exam  Vitals and nursing note reviewed. Exam conducted with a chaperone present.   Constitutional:       General: She is not in acute distress.     Appearance: Normal appearance. She is well-developed. She is not diaphoretic.   HENT:      Head: Normocephalic and atraumatic.      Right Ear: External ear normal.      Left Ear: External ear normal.   Eyes:      General: No scleral icterus.        Right eye: No discharge.         Left eye: No discharge.      Conjunctiva/sclera: Conjunctivae normal.   Neck:      Thyroid: No thyromegaly.   Cardiovascular:      Rate and Rhythm: Normal rate and regular rhythm.      Heart sounds: No murmur heard.  Pulmonary:      Effort: Pulmonary effort is normal. No respiratory distress.      Breath sounds: Normal breath sounds. No wheezing.   Abdominal:      General: Bowel sounds are normal. There is no distension.      Palpations: Abdomen is soft. There is no mass.      Tenderness: There is no abdominal tenderness. There is no guarding.      Hernia: No hernia is present.   Genitourinary:     Comments: External genitalia normal. Vagina without discharge. Able to palpate smooth small round 0.5cm nodule at left aspect of " "vaginal apex. No obvious lesion visible on speculum exam. Laxity of vaginal walls noted with grade 1 anterior compartment prolapse. Cervix, uterus, adnexa surgically absent. Rectovaginal exam deferred.   Musculoskeletal:         General: No swelling, tenderness, deformity or signs of injury.      Cervical back: Normal range of motion and neck supple.      Right lower leg: No edema.      Left lower leg: No edema.   Lymphadenopathy:      Cervical: No cervical adenopathy.   Skin:     General: Skin is warm and dry.      Findings: No erythema, lesion or rash.   Neurological:      General: No focal deficit present.      Mental Status: She is alert and oriented to person, place, and time. Mental status is at baseline.   Psychiatric:         Behavior: Behavior normal.         Thought Content: Thought content normal.          Assessment / Plan    Patricia F Bancroft is a 71 y.o. female who presents with a history of GABINO III (treated surgically in 4/2021). She is here today for surveillance visit. No evidence of recurrence on today's exam. Reassurance provided. Will continue surveillance with exams every 6 months.    History of hysterectomy for benign indications: No history of DANTE II or greater. Paps not currently indicated.    History of rectocele s/p repair with mesh: She remains concerned for possible recurrence of vaginal prolapse as she has noted vaginal tissue while standing in the shower or after straining. Today, she has mild anterior compartment prolapse and laxity of vaginal walls. Able to feel small \"knot\" at left vaginal apex (10 o'clock), most likely anchor from prior mesh placement, though no evidence of erosion. Discussed Kegel exercises. Offered referral to pelvic floor physical therapy but patient not current interested. She will consider.     Pain assessment was performed today as a part of patient’s care.  For patients with pain related to surgery, gynecologic malignancy or cancer treatment, the plan is as " noted in the assessment/plan.  For patients with pain not related to these issues, they are to seek any further needed care from a more appropriate provider, such as PCP.      Diagnoses and all orders for this visit:    1. Vulvar dysplasia (Primary)    2. Vaginal prolapse         Follow Up: 6 months for surveillance of vulvar dysplasia    IVANA Hill MD  Gynecologic Oncology     Please note that portions of this note may have been completed with a voice recognition program.

## 2022-04-28 ENCOUNTER — OFFICE VISIT (OUTPATIENT)
Dept: GYNECOLOGIC ONCOLOGY | Facility: CLINIC | Age: 72
End: 2022-04-28

## 2022-04-28 VITALS
BODY MASS INDEX: 26.53 KG/M2 | HEART RATE: 72 BPM | WEIGHT: 169 LBS | RESPIRATION RATE: 16 BRPM | TEMPERATURE: 96.8 F | OXYGEN SATURATION: 96 % | HEIGHT: 67 IN | DIASTOLIC BLOOD PRESSURE: 67 MMHG | SYSTOLIC BLOOD PRESSURE: 143 MMHG

## 2022-04-28 DIAGNOSIS — N81.10 VAGINAL PROLAPSE: ICD-10-CM

## 2022-04-28 DIAGNOSIS — N90.3 VULVAR DYSPLASIA: Primary | ICD-10-CM

## 2022-04-28 PROCEDURE — 99213 OFFICE O/P EST LOW 20 MIN: CPT | Performed by: OBSTETRICS & GYNECOLOGY

## 2022-05-09 ENCOUNTER — LAB (OUTPATIENT)
Dept: INTERNAL MEDICINE | Facility: CLINIC | Age: 72
End: 2022-05-09

## 2022-05-09 ENCOUNTER — LAB (OUTPATIENT)
Dept: LAB | Facility: HOSPITAL | Age: 72
End: 2022-05-09

## 2022-05-09 DIAGNOSIS — Z20.822 CLOSE EXPOSURE TO COVID-19 VIRUS: ICD-10-CM

## 2022-05-09 DIAGNOSIS — Z20.822 CLOSE EXPOSURE TO COVID-19 VIRUS: Primary | ICD-10-CM

## 2022-05-09 PROCEDURE — U0004 COV-19 TEST NON-CDC HGH THRU: HCPCS | Performed by: PHYSICIAN ASSISTANT

## 2022-05-09 PROCEDURE — U0005 INFEC AGEN DETEC AMPLI PROBE: HCPCS | Performed by: PHYSICIAN ASSISTANT

## 2022-05-10 LAB — SARS-COV-2 RNA NOSE QL NAA+PROBE: NOT DETECTED

## 2022-05-11 ENCOUNTER — TELEPHONE (OUTPATIENT)
Dept: INTERNAL MEDICINE | Facility: CLINIC | Age: 72
End: 2022-05-11

## 2022-05-11 NOTE — TELEPHONE ENCOUNTER
PHONE CALL FROM PATIENT.  STARTED LIKE HEAD COLD BUT STARTING TO GET INTO UPPER RESPIRATORY.  DEEP, DRY COUGH.  HISTORY OF BRONCHITIS.  WHAT TO DO?    PLEASE CALL [phone]

## 2022-05-11 NOTE — TELEPHONE ENCOUNTER
Pt stated she just returned from a trip with some friends April 30th to May 7th.  Eight people from trip tested positive for covid.   Last week her Sx started with head stuffy; cough; low fever; no body aches.  Pt tested negative on Monday. Please advise?

## 2022-05-12 ENCOUNTER — TELEPHONE (OUTPATIENT)
Dept: INTERNAL MEDICINE | Facility: CLINIC | Age: 72
End: 2022-05-12

## 2022-05-12 ENCOUNTER — OFFICE VISIT (OUTPATIENT)
Dept: INTERNAL MEDICINE | Facility: CLINIC | Age: 72
End: 2022-05-12

## 2022-05-12 VITALS
SYSTOLIC BLOOD PRESSURE: 128 MMHG | OXYGEN SATURATION: 98 % | TEMPERATURE: 97.1 F | HEART RATE: 85 BPM | DIASTOLIC BLOOD PRESSURE: 74 MMHG

## 2022-05-12 DIAGNOSIS — J40 BRONCHITIS: Primary | ICD-10-CM

## 2022-05-12 DIAGNOSIS — Z87.891 HISTORY OF TOBACCO USE, PRESENTING HAZARDS TO HEALTH: ICD-10-CM

## 2022-05-12 LAB
EXPIRATION DATE: NORMAL
FLUAV AG NPH QL: NEGATIVE
FLUBV AG NPH QL: NEGATIVE
INTERNAL CONTROL: NORMAL
Lab: NORMAL

## 2022-05-12 PROCEDURE — 87804 INFLUENZA ASSAY W/OPTIC: CPT | Performed by: PHYSICIAN ASSISTANT

## 2022-05-12 PROCEDURE — 99213 OFFICE O/P EST LOW 20 MIN: CPT | Performed by: PHYSICIAN ASSISTANT

## 2022-05-12 RX ORDER — METHYLPREDNISOLONE 4 MG/1
TABLET ORAL
Qty: 21 TABLET | Refills: 0 | Status: SHIPPED | OUTPATIENT
Start: 2022-05-12 | End: 2022-06-29

## 2022-05-12 RX ORDER — AZITHROMYCIN 250 MG/1
TABLET, FILM COATED ORAL
Qty: 6 TABLET | Refills: 0 | Status: SHIPPED | OUTPATIENT
Start: 2022-05-12 | End: 2022-06-29

## 2022-05-12 NOTE — PROGRESS NOTES
"Chief Complaint   Patient presents with   • Cough       Subjective       History of Present Illness     Patricia F Bancroft is a 71 y.o. female.     The patient reports that she started to feel ill about 8 days ago. Her symptoms include head congestion, sneezing, deep cough with productive phlegm this morning, 2022, and she feels her symptoms are worsening. She adds that the coughing is infrequent, but when she coughs \"it sounds like it is coming from the bottom.\" She denies chills. She states that her usual body temperature is 96.8, and has gone up to 97, and 98 degrees. She has some wheezing when she coughs. \"I can feel when I cough it stays open, and pushing out long after the air is gone.\" She denies nausea or vomiting. She has had loose stools, but attributes that to stress. She has used Cinthya-[place] Plus for her symptoms, and routinely takes Zyrtec for her allergies. She occasionally uses Ottoniel-Synephrine nasal spray. The patient was exposed to several people who tested positive for COVID, but she has tested negative. She denies having a sore throat. She has had bronchitis in the past, and pneumonia when she was 21 years of age. She states that every time she gets a cold she develops bronchitis. The last time she had a cold she took a Z-Bryno. She states that her voice is mildly hoarse.      The following portions of the patient's history were reviewed and updated as appropriate: allergies, current medications, past medical history and problem list.    Allergies   Allergen Reactions   • Ciprofloxacin Myalgia   • Dust Mite Extract Itching, Other (See Comments) and Cough     Household Dust, and Animal Dander   • Ibuprofen Hives     Social History     Tobacco Use   • Smoking status: Former Smoker     Packs/day: 1.50     Years: 43.00     Pack years: 64.50     Types: Cigarettes     Start date: 1966     Quit date: 2009     Years since quittin.3   • Smokeless tobacco: Never Used   Substance Use Topics   • " Alcohol use: No     Comment: Quit 1994         Current Outpatient Medications:   •  aspirin 162 MG EC tablet, Take 162 mg by mouth daily., Disp: , Rfl:   •  cetirizine (zyrTEC) 10 MG tablet, Take 10 mg by mouth Daily., Disp: , Rfl:   •  estradiol (CLIMARA) 0.025 MG/24HR patch, APPLY ONE PATCH TO THE SKIN ONCE WEEKLY, Disp: 12 patch, Rfl: 3  •  famotidine (Pepcid) 20 MG tablet, Take 1 tablet by mouth 2 (Two) Times a Day., Disp: 180 tablet, Rfl: 3  •  fluticasone (FLONASE) 50 MCG/ACT nasal spray, 2 sprays into the nostril(s) as directed by provider Daily., Disp: , Rfl:   •  lisinopril (PRINIVIL,ZESTRIL) 10 MG tablet, TAKE 1 TABLET DAILY, Disp: 90 tablet, Rfl: 1  •  metoprolol succinate XL (TOPROL-XL) 25 MG 24 hr tablet, Take 1 tablet by mouth Daily., Disp: 90 tablet, Rfl: 1  •  Polyethylene Glycol 3350 (MIRALAX PO), Take  by mouth., Disp: , Rfl:   •  rosuvastatin (CRESTOR) 40 MG tablet, Take 1 tablet by mouth Daily., Disp: 90 tablet, Rfl: 3  •  azithromycin (Zithromax) 250 MG tablet, Take 2 tablets the first day, then 1 tablet daily for 4 days., Disp: 6 tablet, Rfl: 0  •  methylPREDNISolone (MEDROL) 4 MG dose pack, Take as directed on package instructions., Disp: 21 tablet, Rfl: 0    Review of Systems   Constitutional: Negative for chills and fever.   HENT: Positive for congestion and sneezing. Negative for ear pain and sore throat.    Respiratory: Positive for cough and wheezing. Negative for shortness of breath.    Cardiovascular: Negative for chest pain.   Gastrointestinal: Negative for abdominal pain, nausea and vomiting.   Genitourinary: Negative for dysuria.   Skin: Negative for rash.   Allergic/Immunologic: Negative for immunocompromised state.   Neurological: Negative for dizziness, weakness and headache.       Objective   Vitals:    05/12/22 1008   BP: 128/74   Pulse: 85   Temp: 97.1 °F (36.2 °C)   SpO2: 98%     There is no height or weight on file to calculate BMI.    Physical Exam  Constitutional:        Appearance: Normal appearance. She is well-developed.   HENT:      Head: Normocephalic and atraumatic.      Right Ear: Tympanic membrane, ear canal and external ear normal.      Left Ear: Tympanic membrane, ear canal and external ear normal.      Nose: Nose normal.      Mouth/Throat:      Mouth: Mucous membranes are moist.      Pharynx: Oropharynx is clear.   Eyes:      Conjunctiva/sclera: Conjunctivae normal.   Neck:      Thyroid: No thyromegaly.   Cardiovascular:      Rate and Rhythm: Normal rate and regular rhythm.      Heart sounds: No murmur heard.  Pulmonary:      Effort: Pulmonary effort is normal.      Breath sounds: Normal breath sounds. No wheezing or rales.   Musculoskeletal:      Cervical back: Neck supple.   Lymphadenopathy:      Cervical: No cervical adenopathy.   Skin:     Findings: No rash.   Psychiatric:         Behavior: Behavior normal.               Assessment & Plan   Diagnoses and all orders for this visit:    1. Bronchitis (Primary)  -     azithromycin (Zithromax) 250 MG tablet; Take 2 tablets the first day, then 1 tablet daily for 4 days.  Dispense: 6 tablet; Refill: 0  -     methylPREDNISolone (MEDROL) 4 MG dose pack; Take as directed on package instructions.  Dispense: 21 tablet; Refill: 0  -     POC Influenza A / B    2. History of tobacco use, presenting hazards to health  -      CT Chest Low Dose Cancer Screening WO; Future    1. Bronchitis  - We will start her on a Z-Bryon, and Medrol Dosepak.  - She can continue Cinthya-[place] Plus, and Zyrtec for symptom relief.  - The patient was instructed to not use the Ottoniel-Synephrine more than 3 days.    2. History of tobacco use  - Order placed for CT lung cancer screening.    The patient will follow-up in 06/2022 or sooner for any problems.               Return for Next scheduled follow up.    Transcribed from ambient dictation for Norma Rae PA-C by Angelika Orellana.  05/13/22   11:03 EDT    Patient verbalized consent to the visit recording.

## 2022-05-19 ENCOUNTER — TELEPHONE (OUTPATIENT)
Dept: INTERNAL MEDICINE | Facility: CLINIC | Age: 72
End: 2022-05-19

## 2022-05-19 RX ORDER — BENZONATATE 100 MG/1
100 CAPSULE ORAL 3 TIMES DAILY PRN
Qty: 30 CAPSULE | Refills: 0 | Status: SHIPPED | OUTPATIENT
Start: 2022-05-19 | End: 2022-06-29

## 2022-05-19 NOTE — TELEPHONE ENCOUNTER
Please let her know I have sent in tessalon perles. She can also try OTC Delsym for cough in addition to the perles.

## 2022-05-19 NOTE — TELEPHONE ENCOUNTER
Patient states that she's still experiencing a cough after finishing double antibiotics. Would like to know if there's anything else she can take to help remedy this. Please advise

## 2022-06-01 DIAGNOSIS — Z78.0 POSTMENOPAUSAL: ICD-10-CM

## 2022-06-01 DIAGNOSIS — M85.851 OSTEOPENIA OF NECKS OF BOTH FEMURS: Primary | ICD-10-CM

## 2022-06-01 DIAGNOSIS — M85.852 OSTEOPENIA OF NECKS OF BOTH FEMURS: Primary | ICD-10-CM

## 2022-06-08 ENCOUNTER — HOSPITAL ENCOUNTER (OUTPATIENT)
Dept: CT IMAGING | Facility: HOSPITAL | Age: 72
Discharge: HOME | End: 2022-06-08
Admitting: PHYSICIAN ASSISTANT

## 2022-06-08 DIAGNOSIS — Z87.891 HISTORY OF TOBACCO USE, PRESENTING HAZARDS TO HEALTH: ICD-10-CM

## 2022-06-08 PROCEDURE — 71271 CT THORAX LUNG CANCER SCR C-: CPT

## 2022-06-13 ENCOUNTER — TELEPHONE (OUTPATIENT)
Dept: INTERNAL MEDICINE | Facility: CLINIC | Age: 72
End: 2022-06-13

## 2022-06-13 NOTE — TELEPHONE ENCOUNTER
Please let patient know CT chest was negative for any concerning findings, advised to continue with annual CT screenings.

## 2022-06-23 ENCOUNTER — TRANSCRIBE ORDERS (OUTPATIENT)
Dept: ADMINISTRATIVE | Facility: HOSPITAL | Age: 72
End: 2022-06-23

## 2022-06-23 DIAGNOSIS — Z12.31 VISIT FOR SCREENING MAMMOGRAM: Primary | ICD-10-CM

## 2022-06-29 ENCOUNTER — OFFICE VISIT (OUTPATIENT)
Dept: INTERNAL MEDICINE | Facility: CLINIC | Age: 72
End: 2022-06-29

## 2022-06-29 ENCOUNTER — TELEPHONE (OUTPATIENT)
Dept: INTERNAL MEDICINE | Facility: CLINIC | Age: 72
End: 2022-06-29

## 2022-06-29 VITALS
HEART RATE: 96 BPM | DIASTOLIC BLOOD PRESSURE: 64 MMHG | RESPIRATION RATE: 18 BRPM | OXYGEN SATURATION: 99 % | BODY MASS INDEX: 24.28 KG/M2 | TEMPERATURE: 97.7 F | SYSTOLIC BLOOD PRESSURE: 114 MMHG | WEIGHT: 155 LBS

## 2022-06-29 DIAGNOSIS — I25.10 CORONARY ARTERY DISEASE INVOLVING NATIVE CORONARY ARTERY OF NATIVE HEART WITHOUT ANGINA PECTORIS: Primary | ICD-10-CM

## 2022-06-29 DIAGNOSIS — R73.03 PREDIABETES: ICD-10-CM

## 2022-06-29 DIAGNOSIS — E78.2 MIXED HYPERLIPIDEMIA: ICD-10-CM

## 2022-06-29 DIAGNOSIS — R25.2 LEG CRAMPS: ICD-10-CM

## 2022-06-29 DIAGNOSIS — I10 ESSENTIAL HYPERTENSION: ICD-10-CM

## 2022-06-29 PROCEDURE — 99214 OFFICE O/P EST MOD 30 MIN: CPT | Performed by: PHYSICIAN ASSISTANT

## 2022-06-29 PROCEDURE — 82044 UR ALBUMIN SEMIQUANTITATIVE: CPT | Performed by: PHYSICIAN ASSISTANT

## 2022-06-29 PROCEDURE — 83036 HEMOGLOBIN GLYCOSYLATED A1C: CPT | Performed by: PHYSICIAN ASSISTANT

## 2022-06-29 RX ORDER — LISINOPRIL 10 MG/1
10 TABLET ORAL DAILY
Qty: 90 TABLET | Refills: 1 | Status: SHIPPED | OUTPATIENT
Start: 2022-06-29 | End: 2022-12-05 | Stop reason: SDUPTHER

## 2022-06-29 NOTE — PROGRESS NOTES
"Chief Complaint   Patient presents with   • Diabetes     6 mth fu.     • Leg Pain     Lt        Subjective       History of Present Illness     Patricia F Bancroft is a 71 y.o. female. She presents for follow up of prediabetes, HTN, HLD/ CAD, and new issues of leg cramps and symptoms associated with grief.     The patient complains of bilateral leg cramps every night, she notes this is unusual for her. She is taking Crestor. The patient states she drinks water often, she enjoys water. The patient reports the leg cramps occurring closer to the time she wakes up, she is woken from her sleep and is able to stand up and ease the cramp out. She has had a couple of episodes in the last month. She has not had similar leg cramps in the past associated with statin.     The patient complains of  her fingernails breaking. She recently purchased a B complex vitamin. She takes zinc, magnesium, and calcium, she started these vitamins a few days ago. The patient denies any thinning or hair loss, she describes her hair texture as rough and having \"shredded ends\". She reports a poor appetite, she denies having the desire to cook. She notes she is trying to improve her diet and make herself eat more. The patient has lost weight in the last month. Patient feels many of her symptoms may be related to grief, as her  passed away after a very brief illness 1 month ago today.     The patient reports only sleeping about 6 hours a night, she usually sleeps about 8-9 hours a night. She falls asleep in her chair around 7:00 PM watching television, and then will wake up and go to her bed. The patient notes she does not want any sleep aids at this time. She feels like once she gets back to a routine schedule, this will improve as well.     The patient is taking the lisinopril and metoprolol. She does not monitor her blood pressure at home.     The patient had an eye exam last week by García Bean at [facility].     The following " portions of the patient's history were reviewed and updated as appropriate: allergies, current medications, past medical history, past social history and problem list.    Allergies   Allergen Reactions   • Ciprofloxacin Myalgia   • Dust Mite Extract Itching, Other (See Comments) and Cough     Household Dust, and Animal Dander   • Ibuprofen Hives     Social History     Tobacco Use   • Smoking status: Former Smoker     Packs/day: 1.50     Years: 43.00     Pack years: 64.50     Types: Cigarettes     Start date: 1966     Quit date:      Years since quittin.4   • Smokeless tobacco: Never Used   Substance Use Topics   • Alcohol use: No     Comment: Quit          Current Outpatient Medications:   •  aspirin 162 MG EC tablet, Take 162 mg by mouth daily., Disp: , Rfl:   •  cetirizine (zyrTEC) 10 MG tablet, Take 10 mg by mouth Daily., Disp: , Rfl:   •  estradiol (CLIMARA) 0.025 MG/24HR patch, APPLY ONE PATCH TO THE SKIN ONCE WEEKLY, Disp: 12 patch, Rfl: 3  •  famotidine (Pepcid) 20 MG tablet, Take 1 tablet by mouth 2 (Two) Times a Day., Disp: 180 tablet, Rfl: 3  •  fluticasone (FLONASE) 50 MCG/ACT nasal spray, 2 sprays into the nostril(s) as directed by provider Daily., Disp: , Rfl:   •  lisinopril (PRINIVIL,ZESTRIL) 10 MG tablet, Take 1 tablet by mouth Daily., Disp: 90 tablet, Rfl: 1  •  metoprolol succinate XL (TOPROL-XL) 25 MG 24 hr tablet, Take 1 tablet by mouth Daily., Disp: 90 tablet, Rfl: 1  •  Polyethylene Glycol 3350 (MIRALAX PO), Take  by mouth., Disp: , Rfl:   •  rosuvastatin (CRESTOR) 40 MG tablet, Take 1 tablet by mouth Daily., Disp: 90 tablet, Rfl: 3    Review of Systems   Constitutional: Negative for chills and fever.   HENT: Negative for congestion, ear pain, sore throat and trouble swallowing.    Eyes: Positive for visual disturbance. Negative for pain.   Respiratory: Negative for cough, shortness of breath and wheezing.    Cardiovascular: Negative for chest pain and palpitations.    Gastrointestinal: Negative for abdominal pain, diarrhea, nausea and vomiting.   Genitourinary: Negative for dysuria and hematuria.   Musculoskeletal: Positive for myalgias. Negative for back pain.   Skin: Negative for rash.        +brittle nails, hair   Allergic/Immunologic: Negative for immunocompromised state.   Neurological: Negative for dizziness, syncope, weakness and headache.   Psychiatric/Behavioral: Positive for sleep disturbance and stress. The patient is not nervous/anxious.         +grief       Objective   Vitals:    06/29/22 0813   BP: 114/64   Pulse: 96   Resp: 18   Temp: 97.7 °F (36.5 °C)   SpO2: 99%     Body mass index is 24.28 kg/m².    Physical Exam  Constitutional:       Appearance: Normal appearance. She is well-developed.   HENT:      Head: Normocephalic and atraumatic.      Right Ear: Tympanic membrane, ear canal and external ear normal.      Left Ear: Tympanic membrane, ear canal and external ear normal.      Nose: Nose normal.      Mouth/Throat:      Mouth: Mucous membranes are moist.      Pharynx: Oropharynx is clear.   Eyes:      Conjunctiva/sclera: Conjunctivae normal.   Neck:      Thyroid: No thyromegaly.      Vascular: No carotid bruit.   Cardiovascular:      Rate and Rhythm: Normal rate and regular rhythm.      Heart sounds: No murmur heard.  Pulmonary:      Effort: Pulmonary effort is normal.      Breath sounds: Normal breath sounds. No wheezing or rales.   Abdominal:      Palpations: Abdomen is soft. There is no mass.      Tenderness: There is no abdominal tenderness.   Musculoskeletal:      Cervical back: Neck supple.   Lymphadenopathy:      Cervical: No cervical adenopathy.   Skin:     Findings: No rash.   Psychiatric:         Behavior: Behavior normal.               Assessment & Plan   Diagnoses and all orders for this visit:    1. Coronary artery disease involving native coronary artery of native heart without angina pectoris (Primary)  - Continue Crestor, ASA.     2. Essential  hypertension  -     lisinopril (PRINIVIL,ZESTRIL) 10 MG tablet; Take 1 tablet by mouth Daily.  Dispense: 90 tablet; Refill: 1    3. Mixed hyperlipidemia  - Continue Crestor.     4. Prediabetes  -     POC Glycosylated Hemoglobin (Hb A1C)  -     POC Microalbumin    5. Leg cramps  - Continue zinc, magnesium, and calcium supplements.  - If symptoms do not improve with hydration and potassium levels, we will consider lowering Crestor.             Return in about 6 months (around 12/29/2022) for Subs [facility] - Dr. Whittaker .    Transcribed from ambient dictation for Norma Rae PA-C by Kelley Aldana.  06/29/22   10:47 EDT    Patient verbalized consent to the visit recording.  I have personally performed the services described in this document as transcribed by the above individual, and it is both accurate and complete.  Norma Rae PA-C  6/29/2022  12:43 EDT

## 2022-08-04 DIAGNOSIS — E78.2 MIXED HYPERLIPIDEMIA: Primary | ICD-10-CM

## 2022-08-08 ENCOUNTER — LAB (OUTPATIENT)
Dept: LAB | Facility: HOSPITAL | Age: 72
End: 2022-08-08

## 2022-08-08 DIAGNOSIS — E78.2 MIXED HYPERLIPIDEMIA: ICD-10-CM

## 2022-08-08 LAB
ANION GAP SERPL CALCULATED.3IONS-SCNC: 10 MMOL/L (ref 5–15)
BUN SERPL-MCNC: 13 MG/DL (ref 8–23)
BUN/CREAT SERPL: 17.8 (ref 7–25)
CALCIUM SPEC-SCNC: 9.5 MG/DL (ref 8.6–10.5)
CHLORIDE SERPL-SCNC: 105 MMOL/L (ref 98–107)
CHOLEST SERPL-MCNC: 168 MG/DL (ref 0–200)
CO2 SERPL-SCNC: 25 MMOL/L (ref 22–29)
CREAT SERPL-MCNC: 0.73 MG/DL (ref 0.57–1)
EGFRCR SERPLBLD CKD-EPI 2021: 87.5 ML/MIN/1.73
GLUCOSE SERPL-MCNC: 74 MG/DL (ref 65–99)
HDLC SERPL-MCNC: 64 MG/DL (ref 40–60)
LDLC SERPL CALC-MCNC: 87 MG/DL (ref 0–100)
LDLC/HDLC SERPL: 1.33 {RATIO}
POTASSIUM SERPL-SCNC: 4.6 MMOL/L (ref 3.5–5.2)
SODIUM SERPL-SCNC: 140 MMOL/L (ref 136–145)
TRIGL SERPL-MCNC: 95 MG/DL (ref 0–150)
VLDLC SERPL-MCNC: 17 MG/DL (ref 5–40)

## 2022-08-08 PROCEDURE — 36415 COLL VENOUS BLD VENIPUNCTURE: CPT

## 2022-08-08 PROCEDURE — 80061 LIPID PANEL: CPT

## 2022-08-08 PROCEDURE — 80048 BASIC METABOLIC PNL TOTAL CA: CPT

## 2022-08-24 ENCOUNTER — OFFICE VISIT (OUTPATIENT)
Dept: CARDIOLOGY | Facility: CLINIC | Age: 72
End: 2022-08-24

## 2022-08-24 VITALS
HEIGHT: 67 IN | DIASTOLIC BLOOD PRESSURE: 64 MMHG | OXYGEN SATURATION: 97 % | WEIGHT: 150 LBS | BODY MASS INDEX: 23.54 KG/M2 | SYSTOLIC BLOOD PRESSURE: 112 MMHG | HEART RATE: 81 BPM

## 2022-08-24 DIAGNOSIS — R01.1 HEART MURMUR: ICD-10-CM

## 2022-08-24 DIAGNOSIS — I10 ESSENTIAL HYPERTENSION: ICD-10-CM

## 2022-08-24 DIAGNOSIS — I25.10 CORONARY ARTERY DISEASE INVOLVING NATIVE CORONARY ARTERY OF NATIVE HEART WITHOUT ANGINA PECTORIS: Primary | ICD-10-CM

## 2022-08-24 DIAGNOSIS — E78.2 MIXED HYPERLIPIDEMIA: ICD-10-CM

## 2022-08-24 PROCEDURE — 93000 ELECTROCARDIOGRAM COMPLETE: CPT | Performed by: INTERNAL MEDICINE

## 2022-08-24 PROCEDURE — 99214 OFFICE O/P EST MOD 30 MIN: CPT | Performed by: INTERNAL MEDICINE

## 2022-08-24 RX ORDER — METOPROLOL SUCCINATE 25 MG/1
25 TABLET, EXTENDED RELEASE ORAL DAILY
Qty: 90 TABLET | Refills: 3 | Status: SHIPPED | OUTPATIENT
Start: 2022-08-24

## 2022-08-24 NOTE — PROGRESS NOTES
[facility]  Office visit  Patricia F Bancroft  1950  [phone]    VISIT DATE:  8/24/2022      PCP: Norma Rae PA-C  [address] [address]    CC:  Chief Complaint   Patient presents with   • Coronary Artery Disease       PROBLEM LIST:  1. Coronary artery disease:  a. Status post drug-eluting stent to right coronary artery, May 2014, with no significant residual left system disease (EF 65%).  2. Essential Hypertension.  3. Dyslipidemia.  4. Seasonal allergies/allergic rhinitis.  5. History of remote tobacco use.  6. Hyperglycemia.  7. Surgical history:  a. Benign breast biopsy x2.  b. Hysterectomy with ovary removal.      ASSESSMENT:   Diagnosis Plan   1. Coronary artery disease involving native coronary artery of native heart without angina pectoris     2. Essential hypertension     3. Mixed hyperlipidemia         PLAN:  Coronary disease: Stable asymptomatic.  Continue aspirin 162 mg by mouth daily, high intensity statin therapy and afterload reduction with combination of lisinopril and metoprolol.    Hypertension: Goal less than 130/80.  Currently controlled.  Continue current medical therapy.    Hyperlipidemia: Goal LDL <70, continue rosuvastatin 20 mg p.o. daily.  Currently well controlled.    Cardiac murmur: Does report recurrent episodes of strep pharyngitis as a child but was never diagnosed with rheumatic fever.  Transthoracic echocardiogram pending to evaluate myocardial structure and function.    Palpitations: No high risk clinical features.  Continue beta-blockade.  Previously developed skin reaction to ambulatory ECG monitor.    Subjective  Lost her  back in May soon after diagnosis of lung cancer.  She is exercising on a regular basis however she is easily tearful, decreased appetite, not sleeping well at night.  She has had episodes of depression before and reports that her mood is not deteriorating to that point.  She  "mainly feels like she is going through the grieving process.  Intermittent palpitations at rest.  Denies chest discomfort.  No dyspnea.    PHYSICAL EXAMINATION:  Vitals:    08/24/22 1054   BP: 112/64   BP Location: Right arm   Patient Position: Sitting   Pulse: 81   SpO2: 97%   Weight: 68 kg (150 lb)   Height: 170.2 cm (67\")     General Appearance:    Alert, cooperative, no distress, appears stated age   Head:    Normocephalic, without obvious abnormality, atraumatic   Eyes:    conjunctiva/corneas clear   Nose:   Nares normal, septum midline, mucosa normal, no drainage   Throat:   Lips, teeth and gums normal   Neck:   Supple, symmetrical, trachea midline, no carotid    bruit or JVD   Lungs:     Clear to auscultation bilaterally, respirations unlabored   Chest Wall:    No tenderness or deformity    Heart:    Regular rate and rhythm, S1 and S2 normal, I/6 early peaking systolic murmur right upper sternal border, rub   or gallop, normal carotid impulse bilaterally without bruit.   Abdomen:     Soft, non-tender   Extremities:   Extremities normal, atraumatic, no cyanosis or edema   Pulses:   2+ and symmetric all extremities   Skin:   Skin color, texture, turgor normal, no rashes or lesions       Diagnostic Data:    ECG 12 Lead    Date/Time: 8/24/2022 11:15 AM  Performed by: Ghanshyam Padron III, MD  Authorized by: Ghanshyam Padron III, MD   Comparison: compared with previous ECG   Similar to previous ECG  Rhythm: sinus rhythm  Ectopy: unifocal PVCs  Other findings: non-specific ST-T wave changes    Clinical impression: abnormal EKG          Lab Results   Component Value Date    CHLPL 172 04/27/2016    TRIG 95 08/08/2022    HDL 64 (H) 08/08/2022     Lab Results   Component Value Date    GLUCOSE 74 08/08/2022    BUN 13 08/08/2022    CREATININE 0.73 08/08/2022     08/08/2022    K 4.6 08/08/2022     08/08/2022    CO2 25.0 08/08/2022     Lab Results   Component Value Date    HGBA1C 5.8 06/29/2022     Lab Results "   Component Value Date    WBC 8.00 2021    HGB 16.0 (H) 2021    HCT 48.9 (H) 2021     2021       Allergies  Allergies   Allergen Reactions   • Ciprofloxacin Myalgia   • Dust Mite Extract Itching, Other (See Comments) and Cough     Household Dust, and Animal Dander   • Ibuprofen Hives       Current Medications    Current Outpatient Medications:   •  aspirin 162 MG EC tablet, Take 162 mg by mouth daily., Disp: , Rfl:   •  cetirizine (zyrTEC) 10 MG tablet, Take 10 mg by mouth Daily., Disp: , Rfl:   •  estradiol (CLIMARA) 0.025 MG/24HR patch, APPLY ONE PATCH TO THE SKIN ONCE WEEKLY, Disp: 12 patch, Rfl: 3  •  famotidine (Pepcid) 20 MG tablet, Take 1 tablet by mouth 2 (Two) Times a Day., Disp: 180 tablet, Rfl: 3  •  fluticasone (FLONASE) 50 MCG/ACT nasal spray, 2 sprays into the nostril(s) as directed by provider Daily., Disp: , Rfl:   •  lisinopril (PRINIVIL,ZESTRIL) 10 MG tablet, Take 1 tablet by mouth Daily., Disp: 90 tablet, Rfl: 1  •  metoprolol succinate XL (TOPROL-XL) 25 MG 24 hr tablet, Take 1 tablet by mouth Daily., Disp: 90 tablet, Rfl: 1  •  Polyethylene Glycol 3350 (MIRALAX PO), Take  by mouth., Disp: , Rfl:   •  rosuvastatin (CRESTOR) 40 MG tablet, Take 1 tablet by mouth Daily., Disp: 90 tablet, Rfl: 3          ROS  Review of Systems   Cardiovascular: Negative for chest pain, dyspnea on exertion and irregular heartbeat.   Respiratory: Negative for cough.          SOCIAL HX  Social History     Socioeconomic History   • Marital status:    Tobacco Use   • Smoking status: Former Smoker     Packs/day: 1.50     Years: 43.00     Pack years: 64.50     Types: Cigarettes     Start date: 1966     Quit date: 2009     Years since quittin.0   • Smokeless tobacco: Never Used   Vaping Use   • Vaping Use: Never used   Substance and Sexual Activity   • Alcohol use: No     Comment: Quit    • Drug use: No     Types: Marijuana     Comment: as a teenager   • Sexual activity:  Yes     Partners: Male     Birth control/protection: Post-menopausal       FAMILY HX  Family History   Problem Relation Age of Onset   • Cancer Mother         Lung cancer;  Dec 1996   • Lung cancer Mother    • Cancer Father         Primary site unknown;  1996   • Prostate cancer Father    • Heart disease Father    • Cancer Sister         Pancreatic Stage 1 diagnosed end of Dec 2017; chemo then surgery then chemo ending Oct 5 2018. Doing well so far.   • Pancreatic cancer Sister    • Heart disease Maternal Grandfather    • Diabetes Cousin    • Stroke Maternal Aunt    • Breast cancer Neg Hx    • Ovarian cancer Neg Hx    • Uterine cancer Neg Hx    • Colon cancer Neg Hx    • Melanoma Neg Hx              Ghanshyam Padron III, MD, FACC

## 2022-08-29 ENCOUNTER — HOSPITAL ENCOUNTER (OUTPATIENT)
Dept: MAMMOGRAPHY | Facility: HOSPITAL | Age: 72
Discharge: HOME | End: 2022-08-29

## 2022-08-29 ENCOUNTER — HOSPITAL ENCOUNTER (OUTPATIENT)
Dept: BONE DENSITY | Facility: HOSPITAL | Age: 72
Discharge: HOME | End: 2022-08-29

## 2022-08-29 DIAGNOSIS — M85.852 OSTEOPENIA OF NECKS OF BOTH FEMURS: ICD-10-CM

## 2022-08-29 DIAGNOSIS — Z12.31 VISIT FOR SCREENING MAMMOGRAM: ICD-10-CM

## 2022-08-29 DIAGNOSIS — M85.851 OSTEOPENIA OF NECKS OF BOTH FEMURS: ICD-10-CM

## 2022-08-29 DIAGNOSIS — Z78.0 POSTMENOPAUSAL: ICD-10-CM

## 2022-08-29 PROCEDURE — 77063 BREAST TOMOSYNTHESIS BI: CPT | Performed by: RADIOLOGY

## 2022-08-29 PROCEDURE — 77067 SCR MAMMO BI INCL CAD: CPT

## 2022-08-29 PROCEDURE — 77067 SCR MAMMO BI INCL CAD: CPT | Performed by: RADIOLOGY

## 2022-08-29 PROCEDURE — 77063 BREAST TOMOSYNTHESIS BI: CPT

## 2022-08-29 PROCEDURE — 77080 DXA BONE DENSITY AXIAL: CPT

## 2022-09-06 ENCOUNTER — TELEPHONE (OUTPATIENT)
Dept: INTERNAL MEDICINE | Facility: CLINIC | Age: 72
End: 2022-09-06

## 2022-09-06 NOTE — TELEPHONE ENCOUNTER
Caller: Bancroft, Patricia F    Relationship: Self    Best call back number: [phone]     Caller requesting test results: PATIENT    What test was performed: BONE DENSITY  When was the test performed: 08.29.22  Where was the test performed: [facility]  Additional notes:  LAST BONE DENSITY DONE  03.07.2019 [facility]

## 2022-09-13 ENCOUNTER — HOSPITAL ENCOUNTER (OUTPATIENT)
Dept: CARDIOLOGY | Facility: HOSPITAL | Age: 72
Discharge: HOME | End: 2022-09-13
Admitting: INTERNAL MEDICINE

## 2022-09-13 VITALS — WEIGHT: 149.91 LBS | BODY MASS INDEX: 23.53 KG/M2 | HEIGHT: 67 IN

## 2022-09-13 DIAGNOSIS — R01.1 HEART MURMUR: ICD-10-CM

## 2022-09-13 LAB
ASCENDING AORTA: 3.4 CM
BH CV ECHO MEAS - AO MAX PG: 18.5 MMHG
BH CV ECHO MEAS - AO MEAN PG: 10 MMHG
BH CV ECHO MEAS - AO ROOT DIAM: 2.9 CM
BH CV ECHO MEAS - AO V2 MAX: 215 CM/SEC
BH CV ECHO MEAS - AO V2 VTI: 50.9 CM
BH CV ECHO MEAS - AVA(I,D): 1.61 CM2
BH CV ECHO MEAS - EDV(CUBED): 81.2 ML
BH CV ECHO MEAS - EDV(MOD-SP2): 54 ML
BH CV ECHO MEAS - EDV(MOD-SP4): 48 ML
BH CV ECHO MEAS - EF(MOD-BP): 58 %
BH CV ECHO MEAS - EF(MOD-SP2): 55.6 %
BH CV ECHO MEAS - EF(MOD-SP4): 56.3 %
BH CV ECHO MEAS - ESV(CUBED): 24.6 ML
BH CV ECHO MEAS - ESV(MOD-SP2): 24 ML
BH CV ECHO MEAS - ESV(MOD-SP4): 21 ML
BH CV ECHO MEAS - FS: 32.8 %
BH CV ECHO MEAS - IVS/LVPW: 1.04 CM
BH CV ECHO MEAS - IVSD: 1.05 CM
BH CV ECHO MEAS - LA DIMENSION: 3.6 CM
BH CV ECHO MEAS - LAT PEAK E' VEL: 6.5 CM/SEC
BH CV ECHO MEAS - LV MASS(C)D: 150.1 GRAMS
BH CV ECHO MEAS - LV MAX PG: 3.3 MMHG
BH CV ECHO MEAS - LV MEAN PG: 2 MMHG
BH CV ECHO MEAS - LV V1 MAX: 91 CM/SEC
BH CV ECHO MEAS - LV V1 VTI: 21.5 CM
BH CV ECHO MEAS - LVIDD: 4.3 CM
BH CV ECHO MEAS - LVIDS: 2.9 CM
BH CV ECHO MEAS - LVOT AREA: 3.8 CM2
BH CV ECHO MEAS - LVOT DIAM: 2.2 CM
BH CV ECHO MEAS - LVPWD: 1.01 CM
BH CV ECHO MEAS - MED PEAK E' VEL: 4.8 CM/SEC
BH CV ECHO MEAS - MV A MAX VEL: 98.7 CM/SEC
BH CV ECHO MEAS - MV DEC SLOPE: 327 CM/SEC2
BH CV ECHO MEAS - MV DEC TIME: 0.25 MSEC
BH CV ECHO MEAS - MV E MAX VEL: 68.1 CM/SEC
BH CV ECHO MEAS - MV E/A: 0.69
BH CV ECHO MEAS - MV P1/2T: 91.4 MSEC
BH CV ECHO MEAS - MVA(P1/2T): 2.41 CM2
BH CV ECHO MEAS - PA ACC SLOPE: 522 CM/SEC2
BH CV ECHO MEAS - PA ACC TIME: 0.12 SEC
BH CV ECHO MEAS - PA PR(ACCEL): 26.8 MMHG
BH CV ECHO MEAS - SV(LVOT): 81.7 ML
BH CV ECHO MEAS - SV(MOD-SP2): 30 ML
BH CV ECHO MEAS - SV(MOD-SP4): 27 ML
BH CV ECHO MEAS - TAPSE (>1.6): 2 CM
BH CV ECHO MEAS - TR MAX PG: 22.6 MMHG
BH CV ECHO MEAS - TR MAX VEL: 237.7 CM/SEC
BH CV ECHO MEASUREMENTS AVERAGE E/E' RATIO: 12.05
BH CV VAS BP RIGHT ARM: NORMAL MMHG
BH CV XLRA - RV BASE: 2.5 CM
BH CV XLRA - RV LENGTH: 5.4 CM
BH CV XLRA - RV MID: 2.7 CM
BH CV XLRA - TDI S': 9.6 CM/SEC
IVRT: 98 MSEC
LEFT ATRIUM VOLUME INDEX: 16.8 ML/M2
MAXIMAL PREDICTED HEART RATE: 148 BPM
STRESS TARGET HR: 126 BPM

## 2022-09-13 PROCEDURE — 93306 TTE W/DOPPLER COMPLETE: CPT | Performed by: INTERNAL MEDICINE

## 2022-09-13 PROCEDURE — 93306 TTE W/DOPPLER COMPLETE: CPT

## 2022-09-14 ENCOUNTER — TELEPHONE (OUTPATIENT)
Dept: CARDIOLOGY | Facility: CLINIC | Age: 72
End: 2022-09-14

## 2022-09-14 NOTE — TELEPHONE ENCOUNTER
----- Message from Ghanhsyam Padron III, MD sent at 9/14/2022  1:01 PM EDT -----  Echo revealed normal heart function and no significant valve issues.  We will discuss further follow-up.

## 2022-10-04 ENCOUNTER — FLU SHOT (OUTPATIENT)
Dept: INTERNAL MEDICINE | Facility: CLINIC | Age: 72
End: 2022-10-04

## 2022-10-04 DIAGNOSIS — Z23 NEED FOR INFLUENZA VACCINATION: ICD-10-CM

## 2022-10-04 DIAGNOSIS — Z23 NEED FOR COVID-19 VACCINE: Primary | ICD-10-CM

## 2022-10-04 PROCEDURE — 91312 COVID-19 (PFIZER) BIVALENT BOOSTER 12+YRS: CPT | Performed by: STUDENT IN AN ORGANIZED HEALTH CARE EDUCATION/TRAINING PROGRAM

## 2022-10-04 PROCEDURE — G0008 ADMIN INFLUENZA VIRUS VAC: HCPCS | Performed by: STUDENT IN AN ORGANIZED HEALTH CARE EDUCATION/TRAINING PROGRAM

## 2022-10-04 PROCEDURE — 0124A COVID-19 (PFIZER) BIVALENT BOOSTER 12+YRS: CPT | Performed by: STUDENT IN AN ORGANIZED HEALTH CARE EDUCATION/TRAINING PROGRAM

## 2022-10-04 PROCEDURE — 90662 IIV NO PRSV INCREASED AG IM: CPT | Performed by: STUDENT IN AN ORGANIZED HEALTH CARE EDUCATION/TRAINING PROGRAM

## 2022-10-26 NOTE — PROGRESS NOTES
Follow Up Office Visit      Patient Name: Patricia F Bancroft  : 1950   MRN: [id number]     Chief Complaint:  History of vulvar dysplasia    History of Present Illness: Patricia F Bancroft is a 72 y.o. female who is here today to follow up with Gynecologic Oncology for surveillance visit. She is status post simple partial vulvectomy on 2021 for symptomatic vulvar mass with GABINO 3.    Today, she is overall doing well.  She has been having a hard time since her  passed away suddenly in May, but has been seeing a counselor which is helping.  She denies any new vulvar or vaginal lesions, itching, or irritation. She does not take anything for this. Continues to have some issues with her rectocele. Tried a pessary previously before her hysterectomy. Is not interested in restarting this unless absolutely necessary.     I have reviewed and the following portions of the patient's history were updated as appropriate: past family history, past medical history, past social history, past surgical history, past obstetrics/gynecologic history and problem list.    Medications: The current medication list was reviewed with the patient and updated in the EMR this date per the Medical Assistant. Medication dosages and frequencies were confirmed to be accurate.      Allergies:   Allergies   Allergen Reactions   • Ciprofloxacin Myalgia   • Dust Mite Extract Itching, Other (See Comments) and Cough     Household Dust, and Animal Dander   • Ibuprofen Hives       Subjective      Review of Systems:   Review of Systems   Constitutional: Negative for appetite change, chills, fatigue, fever and unexpected weight change.   Respiratory: Negative for cough, shortness of breath and wheezing.    Cardiovascular: Negative for chest pain, palpitations and leg swelling.   Gastrointestinal: Negative for abdominal distention, abdominal pain, constipation, diarrhea, nausea and vomiting.   Genitourinary: Negative for dyspareunia,  dysuria, frequency, hematuria, pelvic pain, urgency and vaginal bleeding.   Musculoskeletal: Negative for arthralgias, back pain and myalgias.   Neurological: Negative for dizziness, light-headedness, numbness and headaches.   Hematological: Negative for adenopathy.   Psychiatric/Behavioral: Positive for dysphoric mood. The patient is not nervous/anxious.         Objective     Physical Exam:  Vital Signs:   Vitals:    10/27/22 1004   BP: 142/65   Pulse: 72   Resp: 15   Temp: 97.3 °F (36.3 °C)   SpO2: 97%   Weight: 67 kg (147 lb 11.2 oz)   PainSc: 0-No pain     BMI: Body mass index is 23.18 kg/m².     ECOG score: 0           PHQ-2 Depression Screening  Little interest or pleasure in doing things?     Feeling down, depressed, or hopeless?     PHQ-2 Total Score           Physical Exam  Vitals and nursing note reviewed. Exam conducted with a chaperone present.   Constitutional:       General: She is not in acute distress.     Appearance: Normal appearance. She is well-developed. She is not diaphoretic.   HENT:      Head: Normocephalic and atraumatic.      Right Ear: External ear normal.      Left Ear: External ear normal.   Eyes:      General: No scleral icterus.        Right eye: No discharge.         Left eye: No discharge.      Conjunctiva/sclera: Conjunctivae normal.   Neck:      Thyroid: No thyromegaly.   Cardiovascular:      Rate and Rhythm: Normal rate and regular rhythm.      Heart sounds: No murmur heard.  Pulmonary:      Effort: Pulmonary effort is normal. No respiratory distress.      Breath sounds: Normal breath sounds. No wheezing.   Abdominal:      General: Bowel sounds are normal. There is no distension.      Palpations: Abdomen is soft. There is no mass.      Tenderness: There is no abdominal tenderness. There is no guarding.      Hernia: No hernia is present.   Genitourinary:     Comments: External genitalia normal. Vagina without discharge.  Visible and palpable smooth, approximately 0.5 cm nodule in  the midline at the apex.  Not visually concerning for hypervascularity or dysplasia.  Laxity of vaginal walls noted with grade 1 anterior compartment prolapse. Cervix, uterus, adnexa surgically absent. Rectovaginal exam deferred.   Musculoskeletal:         General: No swelling, tenderness, deformity or signs of injury.      Cervical back: Normal range of motion and neck supple.      Right lower leg: No edema.      Left lower leg: No edema.   Lymphadenopathy:      Cervical: No cervical adenopathy.   Skin:     General: Skin is warm and dry.      Findings: No erythema, lesion or rash.   Neurological:      General: No focal deficit present.      Mental Status: She is alert and oriented to person, place, and time. Mental status is at baseline.   Psychiatric:         Behavior: Behavior normal.         Thought Content: Thought content normal.          Assessment / Plan    Patricia F Bancroft is a 72 y.o. female who presents with a history of GABINO III (treated surgically in 4/2021). She is here today for surveillance visit. No evidence of recurrence on today's exam. Reassurance provided. Will continue surveillance with exams every 6 months.    History of hysterectomy for benign indications: No history of DANTE II or greater. Paps not currently indicated.    History of rectocele s/p repair with mesh: Able to feel small smooth nodule midline at vaginal apex (10 o'clock), remains consistent with anchor from prior mesh placement, though no evidence of erosion.  She continues to decline pelvic floor physical therapy referral.    Pain assessment was performed today as a part of patient’s care.  For patients with pain related to surgery, gynecologic malignancy or cancer treatment, the plan is as noted in the assessment/plan.  For patients with pain not related to these issues, they are to seek any further needed care from a more appropriate provider, such as PCP.      Diagnoses and all orders for this visit:    1. History of vulvar  dysplasia (Primary)    2. History of reconstructive repair of rectocele         Follow Up: 6 months for surveillance of vulvar dysplasia    Caitlin Perry MD  Gynecologic Oncology Resident     Patient was seen and examined with Dr. Perry,  resident, who performed portions of the examination and documentation for this patient's care under my direct supervision.  I agree with the above documentation and plan.    IVANA Hill MD  Gynecologic Oncology

## 2022-10-27 ENCOUNTER — OFFICE VISIT (OUTPATIENT)
Dept: GYNECOLOGIC ONCOLOGY | Facility: CLINIC | Age: 72
End: 2022-10-27

## 2022-10-27 VITALS
BODY MASS INDEX: 23.18 KG/M2 | HEART RATE: 72 BPM | OXYGEN SATURATION: 97 % | WEIGHT: 147.7 LBS | SYSTOLIC BLOOD PRESSURE: 142 MMHG | RESPIRATION RATE: 15 BRPM | TEMPERATURE: 97.3 F | DIASTOLIC BLOOD PRESSURE: 65 MMHG

## 2022-10-27 DIAGNOSIS — Z87.412 HISTORY OF VULVAR DYSPLASIA: Primary | ICD-10-CM

## 2022-10-27 DIAGNOSIS — Z98.890 HISTORY OF RECONSTRUCTIVE REPAIR OF RECTOCELE: ICD-10-CM

## 2022-10-27 PROCEDURE — 99213 OFFICE O/P EST LOW 20 MIN: CPT | Performed by: OBSTETRICS & GYNECOLOGY

## 2022-11-07 ENCOUNTER — PATIENT MESSAGE (OUTPATIENT)
Dept: INTERNAL MEDICINE | Facility: CLINIC | Age: 72
End: 2022-11-07

## 2022-11-07 RX ORDER — FAMOTIDINE 20 MG/1
20 TABLET, FILM COATED ORAL 2 TIMES DAILY
Qty: 180 TABLET | Refills: 3 | Status: SHIPPED | OUTPATIENT
Start: 2022-11-07 | End: 2022-11-08 | Stop reason: SDUPTHER

## 2022-11-07 NOTE — TELEPHONE ENCOUNTER
Rx Refill Note  Requested Prescriptions     Pending Prescriptions Disp Refills   • famotidine (Pepcid) 20 MG tablet 180 tablet 3     Sig: Take 1 tablet by mouth 2 (Two) Times a Day.      Last office visit with prescribing clinician: 06/29/2022     Next office visit with prescribing clinician: 1/17/2023 }  Danielle Barksdale MA  11/07/22, 09:19 EST     Last fill: 11/4/2021

## 2022-11-08 DIAGNOSIS — K21.9 GASTROESOPHAGEAL REFLUX DISEASE, UNSPECIFIED WHETHER ESOPHAGITIS PRESENT: Primary | ICD-10-CM

## 2022-11-08 RX ORDER — FAMOTIDINE 10 MG
10 TABLET ORAL 2 TIMES DAILY
Qty: 90 TABLET | Refills: 3 | Status: SHIPPED | OUTPATIENT
Start: 2022-11-08

## 2022-12-05 ENCOUNTER — TELEPHONE (OUTPATIENT)
Dept: INTERNAL MEDICINE | Facility: CLINIC | Age: 72
End: 2022-12-05

## 2022-12-05 DIAGNOSIS — I10 ESSENTIAL HYPERTENSION: ICD-10-CM

## 2022-12-05 DIAGNOSIS — Z78.0 POST-MENOPAUSAL: ICD-10-CM

## 2022-12-05 RX ORDER — LISINOPRIL 10 MG/1
10 TABLET ORAL DAILY
Qty: 90 TABLET | Refills: 3 | Status: SHIPPED | OUTPATIENT
Start: 2022-12-05

## 2022-12-06 DIAGNOSIS — Z78.0 POST-MENOPAUSAL: ICD-10-CM

## 2023-01-17 ENCOUNTER — OFFICE VISIT (OUTPATIENT)
Dept: INTERNAL MEDICINE | Facility: CLINIC | Age: 73
End: 2023-01-17
Payer: MEDICARE

## 2023-01-17 VITALS
WEIGHT: 146.4 LBS | HEART RATE: 65 BPM | RESPIRATION RATE: 18 BRPM | BODY MASS INDEX: 23.53 KG/M2 | SYSTOLIC BLOOD PRESSURE: 136 MMHG | TEMPERATURE: 97.1 F | HEIGHT: 66 IN | OXYGEN SATURATION: 99 % | DIASTOLIC BLOOD PRESSURE: 72 MMHG

## 2023-01-17 DIAGNOSIS — Z87.891 HISTORY OF TOBACCO USE: ICD-10-CM

## 2023-01-17 DIAGNOSIS — Z12.31 ENCOUNTER FOR SCREENING MAMMOGRAM FOR MALIGNANT NEOPLASM OF BREAST: ICD-10-CM

## 2023-01-17 DIAGNOSIS — Z00.00 MEDICARE ANNUAL WELLNESS VISIT, SUBSEQUENT: Primary | ICD-10-CM

## 2023-01-17 DIAGNOSIS — Z12.2 SCREENING FOR LUNG CANCER: ICD-10-CM

## 2023-01-17 DIAGNOSIS — E78.2 MIXED HYPERLIPIDEMIA: ICD-10-CM

## 2023-01-17 DIAGNOSIS — I73.00 RAYNAUD'S PHENOMENON WITHOUT GANGRENE: ICD-10-CM

## 2023-01-17 DIAGNOSIS — R73.03 PREDIABETES: ICD-10-CM

## 2023-01-17 DIAGNOSIS — E11.42 DIABETIC POLYNEUROPATHY ASSOCIATED WITH TYPE 2 DIABETES MELLITUS: ICD-10-CM

## 2023-01-17 LAB
EXPIRATION DATE: NORMAL
HBA1C MFR BLD: 5.6 %
Lab: NORMAL

## 2023-01-17 PROCEDURE — 1126F AMNT PAIN NOTED NONE PRSNT: CPT | Performed by: STUDENT IN AN ORGANIZED HEALTH CARE EDUCATION/TRAINING PROGRAM

## 2023-01-17 PROCEDURE — 1170F FXNL STATUS ASSESSED: CPT | Performed by: STUDENT IN AN ORGANIZED HEALTH CARE EDUCATION/TRAINING PROGRAM

## 2023-01-17 PROCEDURE — 1159F MED LIST DOCD IN RCRD: CPT | Performed by: STUDENT IN AN ORGANIZED HEALTH CARE EDUCATION/TRAINING PROGRAM

## 2023-01-17 PROCEDURE — G0439 PPPS, SUBSEQ VISIT: HCPCS | Performed by: STUDENT IN AN ORGANIZED HEALTH CARE EDUCATION/TRAINING PROGRAM

## 2023-01-17 PROCEDURE — 3044F HG A1C LEVEL LT 7.0%: CPT | Performed by: STUDENT IN AN ORGANIZED HEALTH CARE EDUCATION/TRAINING PROGRAM

## 2023-01-17 PROCEDURE — 83036 HEMOGLOBIN GLYCOSYLATED A1C: CPT | Performed by: STUDENT IN AN ORGANIZED HEALTH CARE EDUCATION/TRAINING PROGRAM

## 2023-01-17 RX ORDER — ROSUVASTATIN CALCIUM 40 MG/1
40 TABLET, COATED ORAL DAILY
Qty: 90 TABLET | Refills: 3 | Status: SHIPPED | OUTPATIENT
Start: 2023-01-17

## 2023-01-17 NOTE — ASSESSMENT & PLAN NOTE
"Patient eligibility for Low Dose CT   Is this baseline CT or annual CT: annual   Has the patient had a chest CT (for any reason) in the past 12 months?: yes   Has the patient been offered tobacco cessation counseling? :yes   Has the patient quit smoking for more than 15 years?: no   Does the patient show any clinical signs/symptoms of lung cancer?: no     Note: If answer to either question 2, 4 or 5 is \"yes\", the patient is not eligible for LDCT for lung cancer screening     The patient and I completed a shared decision making visit once eligibility for lung cancer screening with low dose CT was confirmed. We discussed the risks and benefits of low dose CT lung cancer screening including follow up diagnostic testing, over-diagnosis, possibility of false positives and radiation exposure. The patient was informed of the importance of adherence to annual screening, impact of comorbidities and is willing and able to undergo additional diagnosis and treatment if screen is positive. The patient was also informed of the necessity of smoking cessation and/or maintaining smoking abstinence, including tobacco cessation services as appropriate. Patient is interested in pursuing lung cancer screening with Low Dose CT.      CPT code:      -     A CT Chest Low Dose Cancer Screening will be ordered for 08/2022 or after.  "

## 2023-01-17 NOTE — ASSESSMENT & PLAN NOTE
-     A prescription has been sent for Crestor 40 mg to be taken as directed.   -     Patient advised to discuss lowering aspirin to 81 mg with her cardiologist.  -     Patient advised to discuss estradiol with her oncology gynecologist.

## 2023-01-17 NOTE — ASSESSMENT & PLAN NOTE
-     Discussed Code Status and Medical Interventions with patient.   -     Patient will update tetanus vaccine after speaking with insurance.

## 2023-01-17 NOTE — PROGRESS NOTES
The ABCs of the Annual Wellness Visit  Subsequent Medicare Wellness Visit    Chief Complaint   Patient presents with   • Numbness     Both feet   • skin issues   • Medicare Wellness-subsequent      Subjective    History of Present Illness:  Patricia F Bancroft is a 72 y.o. female who presents for a Subsequent Medicare Wellness Visit.    Mixed hyperlipidemia  Mrs. Bancroft reports that she is doing well. She continues to take rosuvastatin 40 mg and aspirin. Patient has a low LDL. Her BMI is normal. She usually follows up with her cardiologist every August. Patient reports she had an echo 6 months ago, and she will follow up with her cardiologist next week. She adds that the echo was not concerning. She reports that her cardiologist monitors her cholesterol levels. Patient has a history of coronary artery disease.     Sinuses  Patient reports having pressure behind her eye; and congestion. She can feel drainage. Patient massages and applies warm compresses for relief. She states these symptoms have been present for 4 years. She stopped using Flonase until the spring. She reports falling years ago; and injuring her nose. Patient reports being told that she has ocular rosacea.     Prediabetes  The patient states she has always had problems with her blood sugar. At age 16, she reports having a low renal threshold for sugar. Patient reports seeing a yellow haze, which lasts for 5 seconds before her vision clears up. She reports these episodes have occurred when waking from a nap while sitting in a recliner in a dark room; and turning her head to view her mail. Patient reports that she has discussed her vision issues with her ophthalmologist, and they attributed it to circulation.     Diabetic polyneuropathy associated with type 2 diabetes mellitus   Mrs. Bancroft reports numbness on the bottom of her feet, which is not a new problem. However, she states the numbness is more bothersome. Patient reports taking a bath is  painful. She wears orthotics. Her diabetes is well controlled. Patient states she was frostbitten while living in her parents' basement. She reports having calluses on the balls of her feet.  Patient reports intermittent finger twitching.    Raynaud's phenomenon without gangrene  Patient states that the bottom of her feet turns white, which she attributes to it being cold. She adds that the lightness fades when it is warmer.     Papule  Mrs. Bancroft reports having a brown flat bump on her spine for a long time. Patient reports the bump stung while she showered, which is how she noticed it. She adds that it has gotten irritated and red. She adds that it is not very big. Patient reports that she sees a dermatologist every year. Her next appointment is in the Spring of 2023. She has petechial spots on her skin. Patient has venous stasis in her hands.     Osteopenia  She reports having a history of osteopenia for many years. Patient has completed a bone density scan in 08/2022, which also showed osteopenia. Patient adds that doing weight-bearing exercises at the gym has helped improve her osteopenia.    Gynecology  Patient has had a hysterectomy. She continues to take estradiol; and does not want to stop. Patient states she has tried to stop estradiol in the past, and she had bad symptoms, including difficulty sleeping. Patient sees her oncology gynecologist every 6 months.     [facility]  Mrs. Bancroft states her last physical examination was in the Fall of 2019. Patient had a lung cancer screening in 06/2022. Her last mammogram was in 08/2022 as well. She received a Bivalent COVID-19 booster in 10/2023. Her last tetanus shot was in 2012, and she is due for a booster. She received a shingles vaccine in 2014. She has anxiety about taking another shingle vaccine, due to experiencing dysosmia after receiving her shingles vaccine. She reports smelling smoke after the vaccine. Patient reports she is grieving her  , that passed away in May. Denies depression. [facility].    The following portions of the patient's history were reviewed and   updated as appropriate: allergies, current medications, past family history, past medical history, past social history, past surgical history and problem list.    Compared to one year ago, the patient feels her physical   health is the same.    Compared to one year ago, the patient feels her mental   health is the same.    Recent Hospitalizations:  She was not admitted to the hospital during the last year.       Current Medical Providers:  Patient Care Team:  Franco Whittaker MD as PCP - General (Family Medicine)  Nitish Carmona MD as Consulting Physician (Colon and Rectal Surgery)  Dennis Blackwood DPM as Consulting Physician (Podiatry)  Ghanshyam Padron III, MD as Cardiologist (Cardiology)    Outpatient Medications Prior to Visit   Medication Sig Dispense Refill   • aspirin 162 MG EC tablet Take 162 mg by mouth daily.     • cetirizine (zyrTEC) 10 MG tablet Take 10 mg by mouth Daily.     • estradiol (CLIMARA) 0.025 MG/24HR patch Place 1 patch on the skin as directed by provider Every 7 (Seven) Days. 12 patch 3   • famotidine (PEPCID) 10 MG tablet Take 1 tablet by mouth 2 (Two) Times a Day. 90 tablet 3   • fluticasone (FLONASE) 50 MCG/ACT nasal spray 2 sprays into the nostril(s) as directed by provider Daily.     • lisinopril (PRINIVIL,ZESTRIL) 10 MG tablet Take 1 tablet by mouth Daily. 90 tablet 3   • metoprolol succinate XL (TOPROL-XL) 25 MG 24 hr tablet Take 1 tablet by mouth Daily. 90 tablet 3   • Polyethylene Glycol 3350 (MIRALAX PO) Take  by mouth.     • rosuvastatin (CRESTOR) 40 MG tablet Take 1 tablet by mouth Daily. 90 tablet 3     No facility-administered medications prior to visit.       No opioid medication identified on active medication list. I have reviewed chart for other potential  high risk medication/s and harmful  drug interactions in the elderly.          Aspirin is on active medication list. Aspirin use is indicated based on review of current medical condition/s. Pros and cons of this therapy have been discussed today. Benefits of this medication outweigh potential harm.  Patient has been encouraged to continue taking this medication.  .      Patient Active Problem List   Diagnosis   • Coronary artery disease involving native coronary artery of native heart without angina pectoris   • Essential hypertension   • Mixed hyperlipidemia   • Prediabetes   • Allergic rhinitis   • Chronic idiopathic constipation   • Gastroesophageal reflux disease   • Abdominal bloating   • History of tobacco use   • Allergic rhinitis due to animal dander   • Combined form of senile cataract   • Myopia   • Non-toxic uninodular goiter   • Posterior vitreous detachment   • Presbyopia   • Regular astigmatism   • Vulvar dysplasia   • Diabetic polyneuropathy associated with type 2 diabetes mellitus (HCC)   • Medicare annual wellness visit, subsequent     Advance Care Planning  Advance Directive is not on file.  ACP discussion was held with the patient during this visit. Patient does not have an advance directive, information provided.    Review of Systems   Constitutional: Negative for activity change, appetite change, fatigue and fever.   HENT: Positive for congestion. Negative for postnasal drip and rhinorrhea.    Eyes: Positive for pain.   Respiratory: Negative for cough and shortness of breath.    Cardiovascular: Negative for chest pain and palpitations.   Gastrointestinal: Negative for abdominal distention and abdominal pain.   Genitourinary: Negative for difficulty urinating, vaginal bleeding and vaginal discharge.   Musculoskeletal: Negative for arthralgias, gait problem and joint swelling.   Skin: Negative for rash and wound.   Neurological: Positive for numbness. Negative for dizziness.        Objective    Vitals:    01/17/23 0913   BP: 136/72  "  BP Location: Right arm   Patient Position: Sitting   Cuff Size: Adult   Pulse: 65   Resp: 18   Temp: 97.1 °F (36.2 °C)   TempSrc: Temporal   SpO2: 99%   Weight: 66.4 kg (146 lb 6.4 oz)   Height: 168 cm (66.14\")   PainSc: 0-No pain     Estimated body mass index is 23.53 kg/m² as calculated from the following:    Height as of this encounter: 168 cm (66.14\").    Weight as of this encounter: 66.4 kg (146 lb 6.4 oz).    BMI is within normal parameters. No other follow-up for BMI required.      Does the patient have evidence of cognitive impairment? No    Physical Exam  Vitals and nursing note reviewed.   Constitutional:       General: She is not in acute distress.     Appearance: Normal appearance. She is normal weight. She is not ill-appearing or toxic-appearing.   HENT:      Nose: No congestion or rhinorrhea.   Eyes:      General:         Right eye: No discharge.         Left eye: No discharge.      Conjunctiva/sclera: Conjunctivae normal.   Pulmonary:      Effort: Pulmonary effort is normal. No respiratory distress.   Abdominal:      General: Abdomen is flat. There is no distension.   Musculoskeletal:      Cervical back: Normal range of motion.   Skin:     Coloration: Skin is not jaundiced.      Findings: No rash.   Neurological:      General: No focal deficit present.      Mental Status: She is alert. Mental status is at baseline.      Coordination: Coordination normal.      Gait: Gait normal.   Psychiatric:         Mood and Affect: Mood normal.         Behavior: Behavior normal.         Thought Content: Thought content normal.         Judgment: Judgment normal.       Lab Results   Component Value Date    HGBA1C 5.6 2023            HEALTH RISK ASSESSMENT    Smoking Status:  Social History     Tobacco Use   Smoking Status Former   • Packs/day: 1.50   • Years: 43.00   • Pack years: 64.50   • Types: Cigarettes   • Start date: 1966   • Quit date: 2009   • Years since quittin.4   Smokeless Tobacco " Never     Alcohol Consumption:  Social History     Substance and Sexual Activity   Alcohol Use No    Comment: Quit 1994     Fall Risk Screen:    JASS Fall Risk Assessment was completed, and patient is at LOW risk for falls.Assessment completed on:1/17/2023    Depression Screening:  PHQ-2/PHQ-9 Depression Screening 1/17/2023   Retired PHQ-9 Total Score -   Retired Total Score -   Little Interest or Pleasure in Doing Things 0-->not at all   Feeling Down, Depressed or Hopeless 0-->not at all   Trouble Falling or Staying Asleep, or Sleeping Too Much -   Feeling Tired or Having Little Energy -   Poor Appetite or Overeating -   Feeling Bad about Yourself - or that You are a Failure or Have Let Yourself or Your Family Down -   Trouble Concentrating on Things, Such as Reading the Newspaper or Watching Television -   Moving or Speaking So Slowly that Other People Could Have Noticed? Or the Opposite - Being So Fidgety -   Thoughts that You Would be Better Off Dead or of Hurting Yourself in Some Way -   PHQ-9: Brief Depression Severity Measure Score 0   If You Checked Off Any Problems, How Difficult Have These Problems Made It For You to Do Your Work, Take Care of Things at Home, or Get Along with Other People? -       Health Habits and Functional and Cognitive Screening:  Functional & Cognitive Status 12/6/2021   Do you have difficulty preparing food and eating? No   Do you have difficulty bathing yourself, getting dressed or grooming yourself? No   Do you have difficulty using the toilet? No   Do you have difficulty moving around from place to place? No   Do you have trouble with steps or getting out of a bed or a chair? No   Current Diet Well Balanced Diet   Dental Exam Up to date   Eye Exam Up to date   Exercise (times per week) 3 times per week   Current Exercises Include Walking   Current Exercise Activities Include -   Do you need help using the phone?  No   Are you deaf or do you have serious difficulty hearing?  Yes    Do you need help with transportation? No   Do you need help shopping? No   Do you need help preparing meals?  No   Do you need help with housework?  No   Do you need help with laundry? No   Do you need help taking your medications? No   Do you need help managing money? No   Do you ever drive or ride in a car without wearing a seat belt? No   Have you felt unusual stress, anger or loneliness in the last month? No   Who do you live with? Spouse   If you need help, do you have trouble finding someone available to you? No   Have you been bothered in the last four weeks by sexual problems? No   Do you have difficulty concentrating, remembering or making decisions? No       Age-appropriate Screening Schedule:  Refer to the list below for future screening recommendations based on patient's age, sex and/or medical conditions. Orders for these recommended tests are listed in the plan section. The patient has been provided with a written plan.    Health Maintenance   Topic Date Due   • TDAP/TD VACCINES (2 - Td or Tdap) 08/01/2022   • DIABETIC EYE EXAM  06/24/2023   • URINE MICROALBUMIN  06/29/2023   • HEMOGLOBIN A1C  07/17/2023   • LIPID PANEL  08/08/2023   • MAMMOGRAM  08/29/2024   • DXA SCAN  08/29/2024   • INFLUENZA VACCINE  Completed   • DIABETIC FOOT EXAM  Discontinued   • PAP SMEAR  Discontinued   • ZOSTER VACCINE  Discontinued              Assessment & Plan   CMS Preventative Services Quick Reference  Risk Factors Identified During Encounter  None Identified  The above risks/problems have been discussed with the patient.  Follow up actions/plans if indicated are seen below in the Assessment/Plan Section.  Pertinent information has been shared with the patient in the After Visit Summary.    Diagnoses and all orders for this visit:    1. Medicare annual wellness visit, subsequent (Primary)  Assessment & Plan:  -     Discussed Code Status and Medical Interventions with patient.   -     Patient will update tetanus  "vaccine after speaking with insurance.     Orders:  -     Code Status and Medical Interventions:    2. Mixed hyperlipidemia  Assessment & Plan:  -     A prescription has been sent for Crestor 40 mg to be taken as directed.   -     Patient advised to discuss lowering aspirin to 81 mg with her cardiologist.  -     Patient advised to discuss estradiol with her oncology gynecologist.     Orders:  -     Cancel: Lipid Panel; Future  -     rosuvastatin (CRESTOR) 40 MG tablet; Take 1 tablet by mouth Daily.  Dispense: 90 tablet; Refill: 3    3. Prediabetes  Assessment & Plan:  -     A POC Hemoglobin A1C will be ordered.     Orders:  -     POC Glycosylated Hemoglobin (Hb A1C); Future  -     POC Glycosylated Hemoglobin (Hb A1C)    4. History of tobacco use  Assessment & Plan:  Patient eligibility for Low Dose CT   Is this baseline CT or annual CT: annual   Has the patient had a chest CT (for any reason) in the past 12 months?: yes   Has the patient been offered tobacco cessation counseling? :yes   Has the patient quit smoking for more than 15 years?: no   Does the patient show any clinical signs/symptoms of lung cancer?: no     Note: If answer to either question 2, 4 or 5 is \"yes\", the patient is not eligible for LDCT for lung cancer screening     The patient and I completed a shared decision making visit once eligibility for lung cancer screening with low dose CT was confirmed. We discussed the risks and benefits of low dose CT lung cancer screening including follow up diagnostic testing, over-diagnosis, possibility of false positives and radiation exposure. The patient was informed of the importance of adherence to annual screening, impact of comorbidities and is willing and able to undergo additional diagnosis and treatment if screen is positive. The patient was also informed of the necessity of smoking cessation and/or maintaining smoking abstinence, including tobacco cessation services as appropriate. Patient is " interested in pursuing lung cancer screening with Low Dose CT.      CPT code:      -     A CT Chest Low Dose Cancer Screening will be ordered for 08/2022 or after.    Orders:  -     Cancel:  CT Chest Low Dose Cancer Screening WO; Future  -      CT Chest Low Dose Cancer Screening WO; Future    5. Screening for lung cancer  -     Cancel:  CT Chest Low Dose Cancer Screening WO; Future  -      CT Chest Low Dose Cancer Screening WO; Future    6. Raynaud's phenomenon without gangrene    7. Diabetic polyneuropathy associated with type 2 diabetes mellitus (HCC)  Assessment & Plan:  - An order will be given for diabetic neuropathy shoes.   - Patient advised to be active.      8. Encounter for screening mammogram for malignant neoplasm of breast  -     Mammo Screening Digital Tomosynthesis Bilateral With CAD; Future      Follow Up:   Return in about 6 months (around 7/17/2023) for Recheck.     An After Visit Summary and PPPS were made available to the patient.       Transcribed from ambient dictation for Franco Whittaker MD by Mara Abdi.  01/17/23   13:24 EST    Patient or patient representative verbalized consent to the visit recording.  I have personally performed the services described in this document as transcribed by the above individual, and it is both accurate and complete.

## 2023-01-17 NOTE — PATIENT INSTRUCTIONS
Health Maintenance for Postmenopausal Women  Menopause is a normal process in which your reproductive ability comes to an end. This process happens gradually over a span of months to years, usually between the ages of 48 and 55. Menopause is complete when you have missed 12 consecutive menstrual periods.  It is important to talk with your health care provider about some of the most common conditions that affect postmenopausal women, such as heart disease, cancer, and bone loss (osteoporosis). Adopting a healthy lifestyle and getting preventive care can help to promote your health and wellness. Those actions can also lower your chances of developing some of these common conditions.  What should I know about menopause?  During menopause, you may experience a number of symptoms, such as:  Moderate-to-severe hot flashes.  Night sweats.  Decrease in sex drive.  Mood swings.  Headaches.  Tiredness.  Irritability.  Memory problems.  Insomnia.     Choosing to treat or not to treat menopausal changes is an individual decision that you make with your health care provider.  What should I know about hormone replacement therapy and supplements?  Hormone therapy products are effective for treating symptoms that are associated with menopause, such as hot flashes and night sweats. Hormone replacement carries certain risks, especially as you become older. If you are thinking about using estrogen or estrogen with progestin treatments, discuss the benefits and risks with your health care provider.  What should I know about heart disease and stroke?  Heart disease, heart attack, and stroke become more likely as you age. This may be due, in part, to the hormonal changes that your body experiences during menopause. These can affect how your body processes dietary fats, triglycerides, and cholesterol. Heart attack and stroke are both medical emergencies.    There are many things that you can do to help prevent heart disease and  stroke:  Have your blood pressure checked at least every 1-2 years. High blood pressure causes heart disease and increases the risk of stroke.  If you are 55-79 years old, ask your health care provider if you should take aspirin to prevent a heart attack or a stroke.  Do not use any tobacco products, including cigarettes, chewing tobacco, or electronic cigarettes. If you need help quitting, ask your health care provider.  It is important to eat a healthy diet and maintain a healthy weight.  Be sure to include plenty of vegetables, fruits, low-fat dairy products, and lean protein.  Avoid eating foods that are high in solid fats, added sugars, or salt (sodium).  Get regular exercise. This is one of the most important things that you can do for your health.  Try to exercise for at least 150 minutes each week. The type of exercise that you do should increase your heart rate and make you sweat. This is known as moderate-intensity exercise.  Try to do strengthening exercises at least twice each week. Do these in addition to the moderate-intensity exercise.  Know your numbers. Ask your health care provider to check your cholesterol and your blood glucose. Continue to have your blood tested as directed by your health care provider.     What should I know about cancer screening?  There are several types of cancer. Take the following steps to reduce your risk and to catch any cancer development as early as possible.  Breast Cancer  Practice breast self-awareness.  This means understanding how your breasts normally appear and feel.  It also means doing regular breast self-exams. Let your health care provider know about any changes, no matter how small.  If you are 40 or older, have a clinician do a breast exam (clinical breast exam or CBE) every year. Depending on your age, family history, and medical history, it may be recommended that you also have a yearly breast X-ray (mammogram).  If you have a family history of breast  cancer, talk with your health care provider about genetic screening.  If you are at high risk for breast cancer, talk with your health care provider about having an MRI and a mammogram every year.  Breast cancer (BRCA) gene test is recommended for women who have family members with BRCA-related cancers. Results of the assessment will determine the need for genetic counseling and BRCA1 and for BRCA2 testing. BRCA-related cancers include these types:  Breast. This occurs in males or females.  Ovarian.  Tubal. This may also be called fallopian tube cancer.  Cancer of the abdominal or pelvic lining (peritoneal cancer).  Prostate.  Pancreatic.     Cervical, Uterine, and Ovarian Cancer  Your health care provider may recommend that you be screened regularly for cancer of the pelvic organs. These include your ovaries, uterus, and vagina. This screening involves a pelvic exam, which includes checking for microscopic changes to the surface of your cervix (Pap test).  For women ages 21-65, health care providers may recommend a pelvic exam and a Pap test every three years. For women ages 30-65, they may recommend the Pap test and pelvic exam, combined with testing for human papilloma virus (HPV), every five years. Some types of HPV increase your risk of cervical cancer. Testing for HPV may also be done on women of any age who have unclear Pap test results.  Other health care providers may not recommend any screening for nonpregnant women who are considered low risk for pelvic cancer and have no symptoms. Ask your health care provider if a screening pelvic exam is right for you.  If you have had past treatment for cervical cancer or a condition that could lead to cancer, you need Pap tests and screening for cancer for at least 20 years after your treatment. If Pap tests have been discontinued for you, your risk factors (such as having a new sexual partner) need to be reassessed to determine if you should start having screenings  again. Some women have medical problems that increase the chance of getting cervical cancer. In these cases, your health care provider may recommend that you have screening and Pap tests more often.  If you have a family history of uterine cancer or ovarian cancer, talk with your health care provider about genetic screening.  If you have vaginal bleeding after reaching menopause, tell your health care provider.  There are currently no reliable tests available to screen for ovarian cancer.     Lung Cancer  Lung cancer screening is recommended for adults 55-80 years old who are at high risk for lung cancer because of a history of smoking. A yearly low-dose CT scan of the lungs is recommended if you:  Currently smoke.  Have a history of at least 30 pack-years of smoking and you currently smoke or have quit within the past 15 years. A pack-year is smoking an average of one pack of cigarettes per day for one year.     Yearly screening should:  Continue until it has been 15 years since you quit.  Stop if you develop a health problem that would prevent you from having lung cancer treatment.     Colorectal Cancer  This type of cancer can be detected and can often be prevented.  Routine colorectal cancer screening usually begins at age 50 and continues through age 75.  If you have risk factors for colon cancer, your health care provider may recommend that you be screened at an earlier age.  If you have a family history of colorectal cancer, talk with your health care provider about genetic screening.  Your health care provider may also recommend using home test kits to check for hidden blood in your stool.  A small camera at the end of a tube can be used to examine your colon directly (sigmoidoscopy or colonoscopy). This is done to check for the earliest forms of colorectal cancer.  Direct examination of the colon should be repeated every 5-10 years until age 75. However, if early forms of precancerous polyps or small  growths are found or if you have a family history or genetic risk for colorectal cancer, you may need to be screened more often.     Skin Cancer  Check your skin from head to toe regularly.  Monitor any moles. Be sure to tell your health care provider:  About any new moles or changes in moles, especially if there is a change in a mole's shape or color.  If you have a mole that is larger than the size of a pencil eraser.  If any of your family members has a history of skin cancer, especially at a young age, talk with your health care provider about genetic screening.  Always use sunscreen. Apply sunscreen liberally and repeatedly throughout the day.  Whenever you are outside, protect yourself by wearing long sleeves, pants, a wide-brimmed hat, and sunglasses.     What should I know about osteoporosis?  Osteoporosis is a condition in which bone destruction happens more quickly than new bone creation. After menopause, you may be at an increased risk for osteoporosis. To help prevent osteoporosis or the bone fractures that can happen because of osteoporosis, the following is recommended:  If you are 19-50 years old, get at least 1,000 mg of calcium and at least 600 mg of vitamin D per day.  If you are older than age 50 but younger than age 70, get at least 1,200 mg of calcium and at least 600 mg of vitamin D per day.  If you are older than age 70, get at least 1,200 mg of calcium and at least 800 mg of vitamin D per day.     Smoking and excessive alcohol intake increase the risk of osteoporosis. Eat foods that are rich in calcium and vitamin D, and do weight-bearing exercises several times each week as directed by your health care provider.  What should I know about how menopause affects my mental health?  Depression may occur at any age, but it is more common as you become older. Common symptoms of depression include:  Low or sad mood.  Changes in sleep patterns.  Changes in appetite or eating patterns.  Feeling an  overall lack of motivation or enjoyment of activities that you previously enjoyed.  Frequent crying spells.     Talk with your health care provider if you think that you are experiencing depression.  What should I know about immunizations?  It is important that you get and maintain your immunizations. These include:  Tetanus, diphtheria, and pertussis (Tdap) booster vaccine.  Influenza every year before the flu season begins.  Pneumonia vaccine.  Shingles vaccine.     Your health care provider may also recommend other immunizations.  This information is not intended to replace advice given to you by your health care provider. Make sure you discuss any questions you have with your health care provider.  Document Released: 02/09/2007 Document Revised: 07/07/2017 Document Reviewed: 09/20/2016  [employer] Patient Education © 2018 [employer]          Healthy Delicious Recipes from [employer]: https://[employer].org/  [place] Plant Based Meal Recipes: https://[facility].VB Rags/  Heart Healthy Recipes from [employer]: https://abcardio.org/wp-content/uploads/2020/06/ABC_Cookbook.pdf  [place] Healthy Living Guide: https://www.hsp.[place].edu/nutritionsource/2022/01/06/healthy-living-guide-3602-5525/  SNAP Cookbook for Budgets: http://ongov.net/dss/documents/good-and-cheap.pdf

## 2023-01-25 ENCOUNTER — OFFICE VISIT (OUTPATIENT)
Dept: CARDIOLOGY | Facility: CLINIC | Age: 73
End: 2023-01-25
Payer: MEDICARE

## 2023-01-25 VITALS
WEIGHT: 149 LBS | OXYGEN SATURATION: 97 % | HEIGHT: 61 IN | BODY MASS INDEX: 28.13 KG/M2 | DIASTOLIC BLOOD PRESSURE: 68 MMHG | SYSTOLIC BLOOD PRESSURE: 132 MMHG | HEART RATE: 84 BPM

## 2023-01-25 DIAGNOSIS — E78.2 MIXED HYPERLIPIDEMIA: ICD-10-CM

## 2023-01-25 DIAGNOSIS — I10 ESSENTIAL HYPERTENSION: ICD-10-CM

## 2023-01-25 DIAGNOSIS — I25.10 CORONARY ARTERY DISEASE INVOLVING NATIVE CORONARY ARTERY OF NATIVE HEART WITHOUT ANGINA PECTORIS: Primary | ICD-10-CM

## 2023-01-25 PROCEDURE — 99214 OFFICE O/P EST MOD 30 MIN: CPT | Performed by: INTERNAL MEDICINE

## 2023-01-25 NOTE — PROGRESS NOTES
[facility]  Office visit  Patricia F Bancroft  1950  [phone]    VISIT DATE:  1/25/2023      PCP: Franco Whittaker MD  [address] [id number]    CC:  Chief Complaint   Patient presents with   • Coronary artery disease involving native coronary artery of       PROBLEM LIST:  1. Coronary artery disease:  a. Status post drug-eluting stent to right coronary artery, May 2014, with no significant residual left system disease (EF 65%).  2. Essential Hypertension.  3. Dyslipidemia.  4. Seasonal allergies/allergic rhinitis.  5. History of remote tobacco use.  6. Hyperglycemia.  7. Surgical history:  a. Benign breast biopsy x2.  b. Hysterectomy with ovary removal.      September 2022 TTE  • Left ventricular ejection fraction appears to be 56 - 60%. Left ventricular systolic function is normal.  • Left ventricular wall thickness is consistent with mild septal asymmetric hypertrophy. Sigmoid-shaped ventricular septum is present.  • Left ventricular diastolic function is consistent with (grade Ia w/high LAP) impaired relaxation.  • No significant valve heart disease    ASSESSMENT:   Diagnosis Plan   1. Coronary artery disease involving native coronary artery of native heart without angina pectoris        2. Essential hypertension        3. Mixed hyperlipidemia            PLAN:  Coronary disease: Stable asymptomatic.  Continue aspirin 162 mg by mouth daily, high intensity statin therapy and afterload reduction with combination of lisinopril and metoprolol.    Hypertension: Goal less than 130/80.  Currently controlled.  Continue current medical therapy.    Hyperlipidemia: Goal LDL <70, continue rosuvastatin 40 mg p.o. daily.  Currently well controlled.  Would not titrate medical therapy further as long as LDL remains less than 100.    Aortic sclerosis without stenosis.    Palpitations: No high risk clinical features.  Continue beta-blockade.  Previously developed  "skin reaction to ambulatory ECG monitor.    Subjective  Lost her  back in May 2022 soon after diagnosis of lung cancer.  She is exercising on a regular basis without difficulty.  Denies chest discomfort.  No dyspnea.  Blood pressures running less than 130/80 mmHg.  She is compliant with medical therapy.    PHYSICAL EXAMINATION:  Vitals:    01/25/23 1427   BP: 132/68   BP Location: Left arm   Patient Position: Sitting   Cuff Size: Adult   Pulse: 84   SpO2: 97%   Weight: 67.6 kg (149 lb)   Height: 153.9 cm (60.6\")     General Appearance:    Alert, cooperative, no distress, appears stated age   Head:    Normocephalic, without obvious abnormality, atraumatic   Eyes:    conjunctiva/corneas clear   Nose:   Nares normal, septum midline, mucosa normal, no drainage   Throat:   Lips, teeth and gums normal   Neck:   Supple, symmetrical, trachea midline, no carotid    bruit or JVD   Lungs:     Clear to auscultation bilaterally, respirations unlabored   Chest Wall:    No tenderness or deformity    Heart:    Regular rate and rhythm, S1 and S2 normal, I/6 early peaking systolic murmur right upper sternal border, rub   or gallop, normal carotid impulse bilaterally without bruit.   Abdomen:     Soft, non-tender   Extremities:   Extremities normal, atraumatic, no cyanosis or edema   Pulses:   2+ and symmetric all extremities   Skin:   Skin color, texture, turgor normal, no rashes or lesions       Diagnostic Data:  Procedures  Lab Results   Component Value Date    CHLPL 172 04/27/2016    TRIG 95 08/08/2022    HDL 64 (H) 08/08/2022     Lab Results   Component Value Date    GLUCOSE 74 08/08/2022    BUN 13 08/08/2022    CREATININE 0.73 08/08/2022     08/08/2022    K 4.6 08/08/2022     08/08/2022    CO2 25.0 08/08/2022     Lab Results   Component Value Date    HGBA1C 5.6 01/17/2023     Lab Results   Component Value Date    WBC 8.00 03/25/2021    HGB 16.0 (H) 03/25/2021    HCT 48.9 (H) 03/25/2021     03/25/2021 "       Allergies  Allergies   Allergen Reactions   • Ciprofloxacin Myalgia   • Dust Mite Extract Itching, Other (See Comments) and Cough     Household Dust, and Animal Dander   • Ibuprofen Hives       Current Medications    Current Outpatient Medications:   •  aspirin 162 MG EC tablet, Take 162 mg by mouth daily., Disp: , Rfl:   •  cetirizine (zyrTEC) 10 MG tablet, Take 10 mg by mouth Daily., Disp: , Rfl:   •  estradiol (CLIMARA) 0.025 MG/24HR patch, Place 1 patch on the skin as directed by provider Every 7 (Seven) Days., Disp: 12 patch, Rfl: 3  •  famotidine (PEPCID) 10 MG tablet, Take 1 tablet by mouth 2 (Two) Times a Day., Disp: 90 tablet, Rfl: 3  •  fluticasone (FLONASE) 50 MCG/ACT nasal spray, 2 sprays into the nostril(s) as directed by provider Daily., Disp: , Rfl:   •  lisinopril (PRINIVIL,ZESTRIL) 10 MG tablet, Take 1 tablet by mouth Daily., Disp: 90 tablet, Rfl: 3  •  metoprolol succinate XL (TOPROL-XL) 25 MG 24 hr tablet, Take 1 tablet by mouth Daily., Disp: 90 tablet, Rfl: 3  •  Polyethylene Glycol 3350 (MIRALAX PO), Take  by mouth., Disp: , Rfl:   •  rosuvastatin (CRESTOR) 40 MG tablet, Take 1 tablet by mouth Daily., Disp: 90 tablet, Rfl: 3          ROS  Review of Systems   Cardiovascular: Negative for chest pain, dyspnea on exertion and irregular heartbeat.   Respiratory: Negative for cough.          SOCIAL HX  Social History     Socioeconomic History   • Marital status:    Tobacco Use   • Smoking status: Former     Packs/day: 1.50     Years: 43.00     Pack years: 64.50     Types: Cigarettes     Start date: 1966     Quit date: 2009     Years since quittin.4   • Smokeless tobacco: Never   Vaping Use   • Vaping Use: Never used   Substance and Sexual Activity   • Alcohol use: No     Comment: Quit    • Drug use: No     Types: Marijuana     Comment: as a teenager   • Sexual activity: Not Currently     Partners: Male     Birth control/protection: Post-menopausal       FAMILY HX  Family  History   Problem Relation Age of Onset   • Cancer Mother         Lung cancer;  Dec 1996   • Lung cancer Mother    • Cancer Father         Primary site unknown;  1996   • Prostate cancer Father    • Heart disease Father    • Cancer Sister         Pancreatic cancer; 2020   • Pancreatic cancer Sister    • Heart disease Maternal Grandfather    • Diabetes Cousin    • Stroke Maternal Aunt    • Breast cancer Neg Hx    • Ovarian cancer Neg Hx    • Uterine cancer Neg Hx    • Colon cancer Neg Hx    • Melanoma Neg Hx              Ghanshyam Padron III, MD, FACC

## 2023-04-26 NOTE — PROGRESS NOTES
Follow Up Office Visit      Patient Name: Patricia F Bancroft  : 1950   MRN: [id number]     Chief Complaint:  History of vulvar dysplasia    History of Present Illness: Patricia F Bancroft is a 72 y.o. female who is here today to follow up with Gynecologic Oncology for surveillance visit for her history of GABINO III.    Today, she is overall doing well.  She has been doing better with counseling since her  passed away. She feels some intermittent vaginal irritation but no pain. No vaginal bleeding or abnormal discharge. She's not had a breast exam in 3 years and would like to have one today. She did have a mammogram last year which was normal.    I have reviewed and the following portions of the patient's history were updated as appropriate: past family history, past medical history, past social history, past surgical history, past obstetrics/gynecologic history and problem list.    Medications: The current medication list was reviewed with the patient and updated in the EMR this date per the Medical Assistant. Medication dosages and frequencies were confirmed to be accurate.      Allergies:   Allergies   Allergen Reactions   • Ciprofloxacin Myalgia   • Dust Mite Extract Itching, Other (See Comments) and Cough     Household Dust, and Animal Dander   • Ibuprofen Hives       Objective     Physical Exam:  Vital Signs:   Vitals:    23 1415   BP: 124/64  Comment: left arm sitting   Pulse: 73   Temp: 97.4 °F (36.3 °C)   TempSrc: Temporal   SpO2: 98%  Comment: resting room air   Weight: 67.2 kg (148 lb 3.2 oz)   PainSc: 0-No pain     BMI: Body mass index is 28.37 kg/m².                 PHQ-2 Depression Screening  Little interest or pleasure in doing things?     Feeling down, depressed, or hopeless?     PHQ-2 Total Score           Physical Exam  Vitals and nursing note reviewed. Exam conducted with a chaperone present.   Constitutional:       General: She is not in acute distress.     Appearance:  Normal appearance. She is well-developed. She is not diaphoretic.   HENT:      Head: Normocephalic and atraumatic.      Right Ear: External ear normal.      Left Ear: External ear normal.   Neck:      Thyroid: No thyromegaly.   Cardiovascular:      Rate and Rhythm: Normal rate and regular rhythm.      Heart sounds: No murmur heard.  Pulmonary:      Effort: Pulmonary effort is normal. No respiratory distress.      Breath sounds: Normal breath sounds. No wheezing.   Chest:   Breasts:     Jaya Score is 5.      Right: Normal. No swelling, bleeding, mass, nipple discharge, skin change or tenderness.      Left: Normal. No swelling, bleeding, mass, nipple discharge, skin change or tenderness.   Genitourinary:     Comments: External genitalia normal. Vagina without discharge.  Visible approximately 0.5 cm nodule in the midline at the apex.  Not visually concerning for hypervascularity or dysplasia. Bimanual and rectovaginal exam deferred.  Musculoskeletal:         General: No swelling, tenderness, deformity or signs of injury.      Right lower leg: No edema.      Left lower leg: No edema.   Lymphadenopathy:      Upper Body:      Right upper body: No axillary adenopathy.      Left upper body: No axillary adenopathy.   Skin:     General: Skin is warm and dry.      Findings: No erythema, lesion or rash.   Neurological:      General: No focal deficit present.      Mental Status: She is alert and oriented to person, place, and time. Mental status is at baseline.   Psychiatric:         Behavior: Behavior normal.         Thought Content: Thought content normal.          Assessment / Plan    Patricia F Bancroft is a 72 y.o. female who presents with a history of GABINO III (treated surgically in 4/2021). She is here today for surveillance visit. No evidence of recurrence on today's exam. Reassurance provided. Will continue surveillance with exams every 6 months.    Menopause with long-term HRT use: Again reviewed risk and benefit  profile of continued HRT. She prefers to continue due to quality of life issues. Agree with continuation of climara patch. She is on a low dose and has been doing well.      Breast exam performed at patient's request. She has a PCP now that she follows with and had a normal mammogram last year.     History of hysterectomy for benign indications: No history of DANTE II or greater. Paps not currently indicated.    Diagnoses and all orders for this visit:    1. History of vulvar dysplasia (Primary)    2. Menopausal state       Follow Up: 6 months for surveillance of vulvar dysplasia    Betty Fulton MD  Gynecologic Oncology Resident    Patient was seen and examined with Dr. Fulton,  resident, who performed portions of the examination and documentation for this patient's care under my direct supervision.  I agree with the above documentation and plan.    Katya Hill MD  Gynecologic Oncology

## 2023-04-27 ENCOUNTER — OFFICE VISIT (OUTPATIENT)
Dept: GYNECOLOGIC ONCOLOGY | Facility: CLINIC | Age: 73
End: 2023-04-27
Payer: MEDICARE

## 2023-04-27 DIAGNOSIS — N95.1 MENOPAUSAL STATE: ICD-10-CM

## 2023-04-27 DIAGNOSIS — Z87.412 HISTORY OF VULVAR DYSPLASIA: Primary | ICD-10-CM

## 2023-04-27 NOTE — LETTER
2023     Franco Whittaker MD  [address] [facility] [address]  [facility] [address]    Patient: Patricia F Bancroft   YOB: 1950   Date of Visit: 2023       Dear Franco Whittaker MD    Patricia Bancroft was in my office today. Below is a copy of my note.    If you have questions, please do not hesitate to call me. I look forward to following Dahiana along with you.         Sincerely,        Katya Hill MD        CC: No Recipients         Follow Up Office Visit      Patient Name: Patricia F Bancroft  : 1950   MRN: [id number]     Chief Complaint:  History of vulvar dysplasia    History of Present Illness: Patricia F Bancroft is a 72 y.o. female who is here today to follow up with Gynecologic Oncology for surveillance visit for her history of GABINO III.    Today, she is overall doing well.  She has been doing better with counseling since her  passed away. She feels some intermittent vaginal irritation but no pain. No vaginal bleeding or abnormal discharge. She's not had a breast exam in 3 years and would like to have one today. She did have a mammogram last year which was normal.    I have reviewed and the following portions of the patient's history were updated as appropriate: past family history, past medical history, past social history, past surgical history, past obstetrics/gynecologic history and problem list.    Medications: The current medication list was reviewed with the patient and updated in the EMR this date per the Medical Assistant. Medication dosages and frequencies were confirmed to be accurate.      Allergies:   Allergies   Allergen Reactions   • Ciprofloxacin Myalgia   • Dust Mite Extract Itching, Other (See Comments) and Cough     Household Dust, and Animal Dander   • Ibuprofen Hives       Objective     Physical Exam:  Vital Signs:   Vitals:    23 1415   BP: 124/64  Comment: left arm sitting   Pulse: 73   Temp: 97.4 °F (36.3 °C)   TempSrc: Temporal    SpO2: 98%  Comment: resting room air   Weight: 67.2 kg (148 lb 3.2 oz)   PainSc: 0-No pain     BMI: Body mass index is 28.37 kg/m².                 PHQ-2 Depression Screening  Little interest or pleasure in doing things?     Feeling down, depressed, or hopeless?     PHQ-2 Total Score           Physical Exam  Vitals and nursing note reviewed. Exam conducted with a chaperone present.   Constitutional:       General: She is not in acute distress.     Appearance: Normal appearance. She is well-developed. She is not diaphoretic.   HENT:      Head: Normocephalic and atraumatic.      Right Ear: External ear normal.      Left Ear: External ear normal.   Neck:      Thyroid: No thyromegaly.   Cardiovascular:      Rate and Rhythm: Normal rate and regular rhythm.      Heart sounds: No murmur heard.  Pulmonary:      Effort: Pulmonary effort is normal. No respiratory distress.      Breath sounds: Normal breath sounds. No wheezing.   Chest:   Breasts:     Jaya Score is 5.      Right: Normal. No swelling, bleeding, mass, nipple discharge, skin change or tenderness.      Left: Normal. No swelling, bleeding, mass, nipple discharge, skin change or tenderness.   Genitourinary:     Comments: External genitalia normal. Vagina without discharge.  Visible approximately 0.5 cm nodule in the midline at the apex.  Not visually concerning for hypervascularity or dysplasia. Bimanual and rectovaginal exam deferred.  Musculoskeletal:         General: No swelling, tenderness, deformity or signs of injury.      Right lower leg: No edema.      Left lower leg: No edema.   Lymphadenopathy:      Upper Body:      Right upper body: No axillary adenopathy.      Left upper body: No axillary adenopathy.   Skin:     General: Skin is warm and dry.      Findings: No erythema, lesion or rash.   Neurological:      General: No focal deficit present.      Mental Status: She is alert and oriented to person, place, and time. Mental status is at baseline.    Psychiatric:         Behavior: Behavior normal.         Thought Content: Thought content normal.          Assessment / Plan    Patricia F Bancroft is a 72 y.o. female who presents with a history of GABINO III (treated surgically in 4/2021). She is here today for surveillance visit. No evidence of recurrence on today's exam. Reassurance provided. Will continue surveillance with exams every 6 months.    Menopause with long-term HRT use: Again reviewed risk and benefit profile of continued HRT. She prefers to continue due to quality of life issues. Agree with continuation of climara patch. She is on a low dose and has been doing well.      Breast exam performed at patient's request. She has a PCP now that she follows with and had a normal mammogram last year.     History of hysterectomy for benign indications: No history of DANTE II or greater. Paps not currently indicated.    Diagnoses and all orders for this visit:    1. History of vulvar dysplasia (Primary)    2. Menopausal state       Follow Up: 6 months for surveillance of vulvar dysplasia    Betty Fulton MD  Gynecologic Oncology Resident    Patient was seen and examined with Dr. Fulton,  resident, who performed portions of the examination and documentation for this patient's care under my direct supervision.  I agree with the above documentation and plan.    Katya Hill MD  Gynecologic Oncology

## 2023-04-28 VITALS
SYSTOLIC BLOOD PRESSURE: 124 MMHG | OXYGEN SATURATION: 98 % | TEMPERATURE: 97.4 F | HEART RATE: 73 BPM | DIASTOLIC BLOOD PRESSURE: 64 MMHG | WEIGHT: 148.2 LBS | BODY MASS INDEX: 28.37 KG/M2

## 2023-05-25 DIAGNOSIS — N95.1 POST MENOPAUSAL SYNDROME: Primary | ICD-10-CM

## 2023-05-25 RX ORDER — ESTRADIOL 0.03 MG/D
1 FILM, EXTENDED RELEASE TRANSDERMAL
Qty: 8 EACH | Refills: 1 | Status: SHIPPED | OUTPATIENT
Start: 2023-05-25 | End: 2023-05-26

## 2023-05-26 RX ORDER — ESTRADIOL 0.03 MG/D
1 FILM, EXTENDED RELEASE TRANSDERMAL 2 TIMES WEEKLY
Qty: 8 EACH | Refills: 1 | Status: SHIPPED | OUTPATIENT
Start: 2023-05-29

## 2023-06-20 PROBLEM — Z87.891 HISTORY OF TOBACCO USE: Status: RESOLVED | Noted: 2020-12-01 | Resolved: 2023-06-20

## 2023-06-20 PROBLEM — N95.1 POST MENOPAUSAL SYNDROME: Status: ACTIVE | Noted: 2023-06-20

## 2023-07-19 ENCOUNTER — OFFICE VISIT (OUTPATIENT)
Dept: ONCOLOGY | Facility: CLINIC | Age: 73
End: 2023-07-19
Payer: MEDICARE

## 2023-07-19 VITALS
OXYGEN SATURATION: 97 % | WEIGHT: 138.8 LBS | HEART RATE: 72 BPM | SYSTOLIC BLOOD PRESSURE: 144 MMHG | DIASTOLIC BLOOD PRESSURE: 67 MMHG | RESPIRATION RATE: 20 BRPM | BODY MASS INDEX: 26.21 KG/M2 | HEIGHT: 61 IN | TEMPERATURE: 97.3 F

## 2023-07-19 DIAGNOSIS — N90.89 VULVAR LESION: Primary | ICD-10-CM

## 2023-07-19 PROCEDURE — 3077F SYST BP >= 140 MM HG: CPT | Performed by: NURSE PRACTITIONER

## 2023-07-19 PROCEDURE — 1125F AMNT PAIN NOTED PAIN PRSNT: CPT | Performed by: NURSE PRACTITIONER

## 2023-07-19 PROCEDURE — 3078F DIAST BP <80 MM HG: CPT | Performed by: NURSE PRACTITIONER

## 2023-07-19 PROCEDURE — 99213 OFFICE O/P EST LOW 20 MIN: CPT | Performed by: NURSE PRACTITIONER

## 2023-07-20 PROBLEM — A60.04 HERPES SIMPLEX VULVOVAGINITIS: Status: ACTIVE | Noted: 2023-07-20

## 2023-07-26 NOTE — PROGRESS NOTES
"GYN ONCOLOGY FOLLOW-UP    Patricia F Bancroft  [id number]  1950    Subjective   Chief Complaint: vulvar lesion and pain        History of present illness:     Patricia F Bancroft is a 72 y.o. year old female who is here today for complaint of new onset vulvar lesions, external genital swelling, and pain. She has a history of GABINO III, s/p surgical excision in 4/2021, undergoing surveillance with Dr. Hill. Current symptoms had acute onset earlier this week and she called the office yesterday requesting in office evaluation. At the time of the call a prescription for Valtrex was sent to the pharmacy in the case that this is a viral outbreak to help with pain control.   Since her phone call with symptoms, patient has taken 2 doses of Valtrex and notes pain has begun to improve. She describes lesions and pain only to right vulva, feeling of swelling throughout. Denies itching or bleeding. She is currently sexually active with a new partner for the last 8 weeks. This is her first partner since her  of 50 years passed away about 14 months ago. She denies any personal history of STIs or HSV, denies known STIs from new partner. She reports one oral cold sore several decades ago, none since.       The current medication list and allergy list were reviewed and reconciled.     Past Medical History, Past Surgical History, Social History, Family History have been reviewed and are without significant changes except as mentioned.      Review of Systems   Constitutional: Negative.    Gastrointestinal: Negative.    Genitourinary:  Positive for genital sores and vaginal pain.   Psychiatric/Behavioral: Negative.         Objective   Physical Exam  Vital Signs: /67   Pulse 72   Temp 97.3 °F (36.3 °C) (Temporal)   Resp 20   Ht 153.9 cm (60.6\")   Wt 63 kg (138 lb 12.8 oz)   LMP  (LMP Unknown)   SpO2 97%   BMI 26.57 kg/m²   Vitals:    07/19/23 0851   PainSc:   3   PainLoc: Groin           General Appearance:  " alert, cooperative, no apparent distress and appears stated age   Neurologic/Psych: A&O x 3, gait steady, appropriate affect   HEENT:  Normocephalic, without obvious abnormality, mucous membranes moist   Breasts:  deferred   Abdomen:   Soft, non-tender, non-distended, and no organomegaly   Lymph nodes: No inguinal adenopathy noted   Extremities: Normal, atraumatic; no clubbing, cyanosis, or edema    Pelvic: External Genitalia  remarkable for blistered/ulcerated lesions to right labia majora (suspicious for herpetic lesions) and single ulcerated lesion to right labia minora with whitened anterior area. No left vulvar lesions noted  Vagina  is pink, moist, without lesions.   Vaginal Cuff  Female Vaginal Cuff: smooth, intact, and without visible lesions  Uterus  surgically absent  Ovaries  surgically absent bilaterally  Parametria  smooth  Rectovaginal  Female rectovaginal: deferred     ECOG score: 0                    Assessment and Plan:    Diagnoses and all orders for this visit:    1. Vulvar lesion (Primary)  -     HSV 1 & 2 - Specific Antibody, IgG; Future          Patient and I discussed her acute symptoms and my physical exam findings. These are suspicious for genital HSV. She has noted improvement of pain symptom since first 2 doses of Valtrex which additionally leads to consideration of viral source of these lesions. I recommended blood work for confirmation of diagnosis. Encouraged to complete entire course of Valtrex as prescribed. She will be notified of lab results upon their return. Reviewed prescription management PRN outbreaks if HSV confirmed.   Questions answered regarding viral transmission, when to avoid sexual contact, and how to approach conversation with her new partner. Explained to patient that the HSV virus can be present for years before a patient would see an initial outbreak, therefore we cannot definitively determine when transmission initially occurred and from whom. She v/u.     Pain  assessment was performed today as a part of patient’s care.  For patients with pain related to surgery, gynecologic malignancy or cancer treatment, the plan is as noted in the assessment/plan.  For patients with pain not related to these issues, they are to seek any further needed care from a more appropriate provider, such as PCP.      Follow-up:     Return to clinic in 10/2023 for GABINO surveillance with Dr. Hill as scheduled.       Electronically signed by ROSA ELENA Lebron on 07/19/2023

## 2023-08-14 ENCOUNTER — OFFICE VISIT (OUTPATIENT)
Dept: INTERNAL MEDICINE | Facility: CLINIC | Age: 73
End: 2023-08-14
Payer: MEDICARE

## 2023-08-14 VITALS
SYSTOLIC BLOOD PRESSURE: 120 MMHG | RESPIRATION RATE: 12 BRPM | HEART RATE: 65 BPM | WEIGHT: 136 LBS | DIASTOLIC BLOOD PRESSURE: 76 MMHG | TEMPERATURE: 98.1 F | BODY MASS INDEX: 26.04 KG/M2
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
## 2023-08-14 DIAGNOSIS — S80.861A INSECT BITE OF RIGHT LOWER LEG, INITIAL ENCOUNTER: Primary | ICD-10-CM

## 2023-08-14 NOTE — LETTER
"VACCINE CONSENT FORM      Patient Name:  Patricia F Bancroft    Patient :  1950      I/We have read, or have been explained, the information about the diseases and the vaccines listed below.  There was an opportunity to ask questions and any questions were answered satisfactorily.  I/We believe that I/we understand the benefits and risks of the vaccines(s) cited, and ask the vaccine(s) listed below be given to me/us or the person named above (for which i have authorized to make the request).      Vaccine(s) give:    Orders Placed This Encounter   Procedures    COVID-19 (Pfizer) Bivalent 12+yrs         Medicare patients:    The only vaccine covered under your medical benefit is flu/pneumonia and hepatitis B.  All other may be covered under your \"Part D\" prescription plan and requires you to go to a pharmacy with a Physician orders for administration.  If you still prefer to have it administered at our office, you will receive a bill for the vaccine and administration cost.               Patient Initials                     Patient or Parent/Guardian Signature                    Date        A copy of the appropriate Centers for Disease Control and Prevention Vaccine Information Statements has been provided.   "

## 2023-08-14 NOTE — PROGRESS NOTES
"    Office Note     Name: Patricia F Bancroft    : 1950     MRN: [id number]     Chief Complaint  Insect Bite    Subjective     History of Present Illness:  Patricia F Bancroft is a 73 y.o. female who presents today for insect bite.    She complains of an insect bite. Patient reports she believes she was bit by an insect last night and area began as a small hive and then today has a \"splotchy purple red\" appearance.  Patient denies pain or itching. Patient denies any home treatment.  She does not have petechia on other parts of her body.     Review of Systems:   Review of Systems    Past Medical History:   Past Medical History:   Diagnosis Date    Allergic     Allergic rhinitis     Seasonal allergies    Coronary artery disease     S/P JAN to RCA, May 2014 EF 65% no residual Left system disease    Dyslipidemia     Dysosmia     Heart murmur     History of tobacco use 2020    HL (hearing loss) 1970    Tinnitus    Hx of migraines     Hyperlipidemia     Hypertension     Mitral valve prolapse     Osteopenia     Osteopenia improving    Pneumonia 1971    Prediabetes 10/07/2016    Urinary tract infection 2016?       Past Surgical History:   Past Surgical History:   Procedure Laterality Date    BREAST BIOPSY Left     BREAST EXCISIONAL BIOPSY Left     BREAST SURGERY      breast biopsy X2    CARDIAC CATHETERIZATION  2014    Also     COLONOSCOPY  ~    COLONOSCOPY      cologaurd 2017    COLONOSCOPY      2009    CORONARY STENT PLACEMENT  2014    HYSTERECTOMY  2004    OOPHORECTOMY      OTHER SURGICAL HISTORY      Stent placed in the right coronary artery 6 weeks ago    VULVECTOMY  2021       Immunizations:   Immunization History   Administered Date(s) Administered    COVID-19 (PFIZER) BIVALENT 12+YRS 10/04/2022    COVID-19 (PFIZER) Purple Cap Monovalent 2021, 2021, 2021, 2022    FLUAD TRI 65YR+ 10/01/2019    Fluad Quad 65+ 2020    Fluzone High " Dose =>65 Years ([facility] ONLY) 10/03/2016, 10/03/2017, 10/01/2018    Fluzone High-Dose 65+yrs 10/09/2021, 10/04/2022    Pneumococcal Conjugate 13-Valent (PCV13) 2015    Pneumococcal Polysaccharide (PPSV23) 10/17/2016        Medications:     Current Outpatient Medications:     aspirin 162 MG EC tablet, Take 1 tablet by mouth Daily., Disp: , Rfl:     cetirizine (zyrTEC) 10 MG tablet, Take 1 tablet by mouth Daily., Disp: , Rfl:     estradiol (CLIMARA) 0.025 MG/24HR patch, Place 1 patch on the skin as directed by provider 1 (One) Time Per Week., Disp: 16 each, Rfl: 1    famotidine (PEPCID) 10 MG tablet, Take 1 tablet by mouth 2 (Two) Times a Day., Disp: 90 tablet, Rfl: 3    fluticasone (FLONASE) 50 MCG/ACT nasal spray, 2 sprays into the nostril(s) as directed by provider Daily., Disp: , Rfl:     lisinopril (PRINIVIL,ZESTRIL) 10 MG tablet, Take 1 tablet by mouth Daily., Disp: 90 tablet, Rfl: 3    metoprolol succinate XL (TOPROL-XL) 25 MG 24 hr tablet, Take 1 tablet by mouth Daily., Disp: 90 tablet, Rfl: 1    Polyethylene Glycol 3350 (MIRALAX PO), Take  by mouth., Disp: , Rfl:     rosuvastatin (CRESTOR) 40 MG tablet, Take 1 tablet by mouth Daily., Disp: 90 tablet, Rfl: 3    Allergies:   Allergies   Allergen Reactions    Ciprofloxacin Myalgia    Dust Mite Extract Itching, Other (See Comments) and Cough     Household Dust, and Animal Dander    Ibuprofen Hives       Family History:   Family History   Problem Relation Age of Onset    Cancer Mother         Lung cancer;  Dec 1996    Lung cancer Mother     Cancer Father         Primary site unknown;  1996    Prostate cancer Father     Heart disease Father     Cancer Sister         Pancreatic cancer;      Pancreatic cancer Sister     Heart disease Maternal Grandfather     Diabetes Cousin     Stroke Maternal Aunt     Breast cancer Neg Hx     Ovarian cancer Neg Hx     Uterine cancer Neg Hx     Colon cancer Neg Hx     Melanoma Neg Hx        Social History:  "  Social History     Socioeconomic History    Marital status:    Tobacco Use    Smoking status: Former     Packs/day: 1.50     Years: 43.00     Pack years: 64.50     Types: Cigarettes     Start date: 1966     Quit date: 2009     Years since quittin.0    Smokeless tobacco: Never   Vaping Use    Vaping Use: Never used   Substance and Sexual Activity    Alcohol use: No     Comment: Quit     Drug use: No     Types: Marijuana     Comment: as a teenager    Sexual activity: Yes     Partners: Male     Birth control/protection: Post-menopausal         Objective     Vital Signs  /76 (BP Location: Right arm, Patient Position: Sitting, Cuff Size: Adult)   Pulse 65   Temp 98.1 øF (36.7 øC) (Infrared)   Resp 12   Wt 61.7 kg (136 lb)   BMI 26.04 kg/mý   Estimated body mass index is 26.04 kg/mý as calculated from the following:    Height as of 23: 153.9 cm (60.6\").    Weight as of this encounter: 61.7 kg (136 lb).          Physical Exam  Vitals and nursing note reviewed.   Constitutional:       Appearance: Normal appearance.   Cardiovascular:      Rate and Rhythm: Normal rate and regular rhythm.      Pulses: Normal pulses.      Heart sounds: Normal heart sounds.   Pulmonary:      Effort: Pulmonary effort is normal.      Breath sounds: Normal breath sounds.   Skin:     General: Skin is warm and dry.   Neurological:      General: No focal deficit present.      Mental Status: She is alert.   Psychiatric:         Mood and Affect: Mood normal.         Behavior: Behavior normal.        Assessment and Plan     1. Insect bite of right lower leg, initial encounter  Patient instructed to monitor carefully, use hydrocortisone if needed.   Red flag symptoms discussed with patient, patient will call office if symptoms worsen     Patient verbalized good understanding of diagnosis and treatment plan.  All questions answered to their satisfaction.      Follow Up  No follow-ups on file.    Pau Gilbert, " KAMAR DIAS [facility]  [address] [address] [phone]  Transcribed from ambient dictation for Pau Gilbert PA-C by Viktoriya Frank.  08/14/23   15:12 EDT    Patient or patient representative verbalized consent to the visit recording.  I have personally performed the services described in this document as transcribed by the above individual, and it is both accurate and complete.

## 2023-08-31 ENCOUNTER — HOSPITAL ENCOUNTER (OUTPATIENT)
Dept: MAMMOGRAPHY | Facility: HOSPITAL | Age: 73
Discharge: HOME | End: 2023-08-31
Payer: MEDICARE

## 2023-08-31 ENCOUNTER — HOSPITAL ENCOUNTER (OUTPATIENT)
Dept: CT IMAGING | Facility: HOSPITAL | Age: 73
Discharge: HOME | End: 2023-08-31
Payer: MEDICARE

## 2023-08-31 DIAGNOSIS — Z12.31 ENCOUNTER FOR SCREENING MAMMOGRAM FOR MALIGNANT NEOPLASM OF BREAST: ICD-10-CM

## 2023-08-31 DIAGNOSIS — Z87.891 HISTORY OF TOBACCO USE: ICD-10-CM

## 2023-08-31 DIAGNOSIS — Z12.2 SCREENING FOR LUNG CANCER: ICD-10-CM

## 2023-08-31 PROBLEM — J98.4: Status: ACTIVE | Noted: 2023-08-31

## 2023-08-31 PROCEDURE — 71271 CT THORAX LUNG CANCER SCR C-: CPT

## 2023-08-31 PROCEDURE — 77063 BREAST TOMOSYNTHESIS BI: CPT

## 2023-08-31 PROCEDURE — 77067 SCR MAMMO BI INCL CAD: CPT

## 2023-10-02 ENCOUNTER — FLU SHOT (OUTPATIENT)
Dept: INTERNAL MEDICINE | Facility: CLINIC | Age: 73
End: 2023-10-02
Payer: MEDICARE

## 2023-10-02 DIAGNOSIS — Z23 NEED FOR INFLUENZA VACCINATION: Primary | ICD-10-CM

## 2023-10-02 PROCEDURE — G0008 ADMIN INFLUENZA VIRUS VAC: HCPCS | Performed by: STUDENT IN AN ORGANIZED HEALTH CARE EDUCATION/TRAINING PROGRAM

## 2023-10-02 PROCEDURE — 90662 IIV NO PRSV INCREASED AG IM: CPT | Performed by: STUDENT IN AN ORGANIZED HEALTH CARE EDUCATION/TRAINING PROGRAM

## 2023-10-04 ENCOUNTER — TELEPHONE (OUTPATIENT)
Dept: GYNECOLOGIC ONCOLOGY | Facility: CLINIC | Age: 73
End: 2023-10-04
Payer: MEDICARE

## 2023-10-04 NOTE — TELEPHONE ENCOUNTER
"  Caller: Bancroft, Patricia F \"Pat\"    Relationship: Self    Best call back number: [phone]    What is the best time to reach you: ANYTIME    Who are you requesting to speak with (clinical staff, provider,  specific staff member): CLINICAL     Do you know the name of the person who called: DAHIANA     What was the call regarding: PATIENT NEEDS A SCRIPT SENT TO [facility], SHE WOULD LIKE A DIFFERENT VERSION OF THE PATCH, WOULD LIKE THE VIVELLE-DOT PATCH.      CALL WITH ANY QUESTIONS.       Is it okay if the provider responds through MyChart:       "

## 2023-10-05 ENCOUNTER — TELEPHONE (OUTPATIENT)
Dept: INTERNAL MEDICINE | Facility: CLINIC | Age: 73
End: 2023-10-05
Payer: MEDICARE

## 2023-10-05 DIAGNOSIS — N95.1 POST MENOPAUSAL SYNDROME: Primary | ICD-10-CM

## 2023-10-05 NOTE — TELEPHONE ENCOUNTER
Does she have a brand that she has previously used where she has tolerated the adhesive better than her current patches?  Thanks.

## 2023-10-05 NOTE — TELEPHONE ENCOUNTER
"RN called patient back and let her know that Dr. Hill wants her PCP, Dr. Whittaker, to continue to manage her hormone patches at this time. Patient told RN that Dr. Whittaker previously told her that he wants Dr. Hill to take over her hormone management. RN told patient that Dr. Hill usually does not prescribe hormones unless she is the one who put the patient into menopause surgically by removing their ovaries. RN asked patient if she had a regular gynecologist and patient replied, \"Dr. Hill is my regular gynecologist.\" Per Dr. Hill's instruction, RN told patient to call Dr. Whittaker's office to continue to manage her HRT at this time, and call us back if she has any trouble. Patient has appointment with MD on 10/26 and can discuss this further then.   "

## 2023-10-05 NOTE — TELEPHONE ENCOUNTER
"Caller: Bancroft, Patricia F \"Pat\"    Relationship: Self    Best call back number: [phone]     What medication are you requesting: VIVALDOT TWICE WEEKLY HORMONE PATCH    What are your current symptoms: HORMONE REPLACEMENT    Have you had these symptoms before:    [x] Yes  [] No    Have you been treated for these symptoms before:   [x] Yes  [] No    If a prescription is needed, what is your preferred pharmacy and phone number:  [facility] [place] [id number] [facility] [phone] [facility] [phone]      Additional notes: PATIENT STATED THAT THE ADHESIVE ON THE CURRENT PATCHES THAT SHE USES CAUSES HER SKIN TO BREAK OUT AND DOES NOT STICK VERY WELL.    PLEASE CALL THE PATIENT WHEN THIS IS CALLED IN AND LEAVE A MESSAGE IF NO ANSWER  "

## 2023-10-06 RX ORDER — ESTRADIOL 0.03 MG/D
1 FILM, EXTENDED RELEASE TRANSDERMAL 2 TIMES WEEKLY
Qty: 8 EACH | Refills: 3 | Status: SHIPPED | OUTPATIENT
Start: 2023-10-09

## 2023-10-06 NOTE — TELEPHONE ENCOUNTER
Patient states she is currently getting the generic Miland brand for her patches but the adhesive really irritates her skin and they don't stay on well. The patient is requesting to get the name brand Vivaldot twice weekly hormone patches. She has contacted the pharmacy and they said her insurance will cover it with no copay.

## 2023-10-06 NOTE — TELEPHONE ENCOUNTER
I have called and notified her that her medication was sent in and she demonstrates appreciation and understanding.

## 2023-10-06 NOTE — TELEPHONE ENCOUNTER
I left a message on the patients voicemail to call our office back, office number provided.     HUB relay:  Does she have a brand that she has previously used where she has tolerated the adhesive better than her current patches?  Thanks.

## 2023-10-23 NOTE — PROGRESS NOTES
"     Follow Up Office Visit      Patient Name: Patricia F Bancroft  : 1950   MRN: [id number]     Chief Complaint:  History of vulvar dysplasia    History of Present Illness: Patricia F Bancroft is a 73 y.o. female who is here today to follow up with Gynecologic Oncology for surveillance visit for her history of GABINO III. She denies any new vulvar lesions today. Menopause symptoms well controlled with estrogen patch.    I have reviewed and the following portions of the patient's history were updated as appropriate: past family history, past medical history, past social history, past surgical history, past obstetrics/gynecologic history and problem list.    Medications: The current medication list was reviewed with the patient and updated in the EMR this date per the Medical Assistant. Medication dosages and frequencies were confirmed to be accurate.      Allergies:   Allergies   Allergen Reactions    Ciprofloxacin Myalgia    Dust Mite Extract Itching, Other (See Comments) and Cough     Household Dust, and Animal Dander    Ibuprofen Hives       Objective     Physical Exam:  Vital Signs:   Vitals:    10/26/23 1405   BP: 108/64   Pulse: 64   Resp: 18   Temp: 98.4 °F (36.9 °C)   TempSrc: Temporal   SpO2: 98%   Weight: 62.2 kg (137 lb 3.2 oz)   Height: 153.9 cm (60.6\")   PainSc: 0-No pain       BMI: Body mass index is 26.27 kg/m².     ECOG score: 0           PHQ-2 Depression Screening  Little interest or pleasure in doing things?     Feeling down, depressed, or hopeless?     PHQ-2 Total Score           Physical Exam  Vitals and nursing note reviewed. Exam conducted with a chaperone present.   Constitutional:       General: She is not in acute distress.     Appearance: Normal appearance. She is well-developed. She is not diaphoretic.   HENT:      Head: Normocephalic and atraumatic.      Right Ear: External ear normal.      Left Ear: External ear normal.   Neck:      Thyroid: No thyromegaly.   Cardiovascular:      " Rate and Rhythm: Normal rate and regular rhythm.      Heart sounds: No murmur heard.  Pulmonary:      Effort: Pulmonary effort is normal. No respiratory distress.      Breath sounds: Normal breath sounds. No wheezing.   Chest:   Breasts:     Jaya Score is 5.      Right: Normal. No swelling, bleeding, mass, nipple discharge, skin change or tenderness.      Left: Normal. No swelling, bleeding, mass, nipple discharge, skin change or tenderness.   Genitourinary:     Comments: External genitalia normal. Vagina without discharge.  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 Vaginal mucosa normal.   Musculoskeletal:         General: No swelling, tenderness, deformity or signs of injury.      Right lower leg: No edema.      Left lower leg: No edema.   Lymphadenopathy:      Upper Body:      Right upper body: No axillary adenopathy.      Left upper body: No axillary adenopathy.   Skin:     General: Skin is warm and dry.      Findings: No erythema, lesion or rash.   Neurological:      General: No focal deficit present.      Mental Status: She is alert and oriented to person, place, and time. Mental status is at baseline.   Psychiatric:         Behavior: Behavior normal.         Thought Content: Thought content normal.          Assessment / Plan    Patricia F Bancroft is a 73 y.o. female who presents with a history of GABINO III (treated surgically in 4/2021). She is here today for surveillance visit. No evidence of recurrence on today's exam. Reassurance provided. Recommend annual follow up with OB/GYN. Discussed that I am no longer going to be with [employer] after the first of the year so we will need to transfer her care to a general OB/GYN. A referral was placed today.     Menopause with long-term HRT use: Again reviewed risk and benefit profile of continued HRT. She prefers to continue due to quality of life issues. Agree with continuation of low-dose estrogen patch. Refill provided.    Breast exam  performed at patient's request. She has a PCP now that she follows with and had a normal mammogram last year.     Diagnoses and all orders for this visit:    1. History of vulvar dysplasia (Primary)  -     Cancel: Ambulatory Referral to Obstetrics / Gynecology  -     Ambulatory Referral to Obstetrics / Gynecology    2. Post menopausal syndrome  -     Ambulatory Referral to Obstetrics / Gynecology  -     estradiol (VIVELLE-DOT) 0.025 MG/24HR patch; Place 1 patch on the skin as directed by provider 2 (Two) Times a Week.  Dispense: 24 each; Refill: 3         Follow Up: RENY Hill MD  Gynecologic Oncology

## 2023-10-26 ENCOUNTER — OFFICE VISIT (OUTPATIENT)
Dept: GYNECOLOGIC ONCOLOGY | Facility: CLINIC | Age: 73
End: 2023-10-26
Payer: MEDICARE

## 2023-10-26 VITALS
WEIGHT: 137.2 LBS | DIASTOLIC BLOOD PRESSURE: 64 MMHG | RESPIRATION RATE: 18 BRPM | OXYGEN SATURATION: 98 % | SYSTOLIC BLOOD PRESSURE: 108 MMHG | HEIGHT: 61 IN | TEMPERATURE: 98.4 F | BODY MASS INDEX: 25.9 KG/M2 | HEART RATE: 64 BPM

## 2023-10-26 DIAGNOSIS — Z87.412 HISTORY OF VULVAR DYSPLASIA: Primary | ICD-10-CM

## 2023-10-26 DIAGNOSIS — N95.1 POST MENOPAUSAL SYNDROME: ICD-10-CM

## 2023-10-26 RX ORDER — ESTRADIOL 0.03 MG/D
1 FILM, EXTENDED RELEASE TRANSDERMAL 2 TIMES WEEKLY
Qty: 24 EACH | Refills: 3 | Status: SHIPPED | OUTPATIENT
Start: 2023-10-26 | End: 2023-10-27 | Stop reason: SDUPTHER

## 2023-10-26 NOTE — LETTER
"2023     Franco Whittaker MD  [address]  [facility] [address]    Patient: Patricia F Bancroft   YOB: 1950   Date of Visit: 10/26/2023       Dear Franco Whittaker MD    Patricia Bancroft was in my office today. Below is a copy of my note.    If you have questions, please do not hesitate to call me. I look forward to following [place] along with you.         Sincerely,        Katya Hill MD        CC: No Recipients         Follow Up Office Visit      Patient Name: Patricia F Bancroft  : 1950   MRN: [id number]     Chief Complaint:  History of vulvar dysplasia    History of Present Illness: Patricia F Bancroft is a 73 y.o. female who is here today to follow up with Gynecologic Oncology for surveillance visit for her history of GABINO III. She denies any new vulvar lesions today. Menopause symptoms well controlled with estrogen patch.    I have reviewed and the following portions of the patient's history were updated as appropriate: past family history, past medical history, past social history, past surgical history, past obstetrics/gynecologic history and problem list.    Medications: The current medication list was reviewed with the patient and updated in the EMR this date per the Medical Assistant. Medication dosages and frequencies were confirmed to be accurate.      Allergies:   Allergies   Allergen Reactions   • Ciprofloxacin Myalgia   • Dust Mite Extract Itching, Other (See Comments) and Cough     Household Dust, and Animal Dander   • Ibuprofen Hives       Objective     Physical Exam:  Vital Signs:   Vitals:    10/26/23 1405   BP: 108/64   Pulse: 64   Resp: 18   Temp: 98.4 °F (36.9 °C)   TempSrc: Temporal   SpO2: 98%   Weight: 62.2 kg (137 lb 3.2 oz)   Height: 153.9 cm (60.6\")   PainSc: 0-No pain       BMI: Body mass index is 26.27 kg/m².     ECOG score: 0           PHQ-2 Depression Screening  Little interest or pleasure in doing things?     Feeling down, " depressed, or hopeless?     PHQ-2 Total Score           Physical Exam  Vitals and nursing note reviewed. Exam conducted with a chaperone present.   Constitutional:       General: She is not in acute distress.     Appearance: Normal appearance. She is well-developed. She is not diaphoretic.   HENT:      Head: Normocephalic and atraumatic.      Right Ear: External ear normal.      Left Ear: External ear normal.   Neck:      Thyroid: No thyromegaly.   Cardiovascular:      Rate and Rhythm: Normal rate and regular rhythm.      Heart sounds: No murmur heard.  Pulmonary:      Effort: Pulmonary effort is normal. No respiratory distress.      Breath sounds: Normal breath sounds. No wheezing.   Chest:   Breasts:     Jaya Score is 5.      Right: Normal. No swelling, bleeding, mass, nipple discharge, skin change or tenderness.      Left: Normal. No swelling, bleeding, mass, nipple discharge, skin change or tenderness.   Genitourinary:     Comments: External genitalia normal. Vagina without discharge.  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 Vaginal mucosa normal.   Musculoskeletal:         General: No swelling, tenderness, deformity or signs of injury.      Right lower leg: No edema.      Left lower leg: No edema.   Lymphadenopathy:      Upper Body:      Right upper body: No axillary adenopathy.      Left upper body: No axillary adenopathy.   Skin:     General: Skin is warm and dry.      Findings: No erythema, lesion or rash.   Neurological:      General: No focal deficit present.      Mental Status: She is alert and oriented to person, place, and time. Mental status is at baseline.   Psychiatric:         Behavior: Behavior normal.         Thought Content: Thought content normal.          Assessment / Plan    Patricia F Bancroft is a 73 y.o. female who presents with a history of GABINO III (treated surgically in 4/2021). She is here today for surveillance visit. No evidence of recurrence on  today's exam. Reassurance provided. Recommend annual follow up with OB/GYN. Discussed that I am no longer going to be with [facility] after the first of the year so we will need to transfer her care to a general OB/GYN. A referral was placed today.     Menopause with long-term HRT use: Again reviewed risk and benefit profile of continued HRT. She prefers to continue due to quality of life issues. Agree with continuation of low-dose estrogen patch. Refill provided.    Breast exam performed at patient's request. She has a PCP now that she follows with and had a normal mammogram last year.     Diagnoses and all orders for this visit:    1. History of vulvar dysplasia (Primary)  -     Cancel: Ambulatory Referral to Obstetrics / Gynecology  -     Ambulatory Referral to Obstetrics / Gynecology    2. Post menopausal syndrome  -     Ambulatory Referral to Obstetrics / Gynecology  -     estradiol (VIVELLE-DOT) 0.025 MG/24HR patch; Place 1 patch on the skin as directed by provider 2 (Two) Times a Week.  Dispense: 24 each; Refill: 3         Follow Up: RENY Hill MD  Gynecologic Oncology

## 2023-10-27 DIAGNOSIS — N95.1 POST MENOPAUSAL SYNDROME: ICD-10-CM

## 2023-10-27 RX ORDER — ESTRADIOL 0.03 MG/D
1 FILM, EXTENDED RELEASE TRANSDERMAL 2 TIMES WEEKLY
Qty: 24 EACH | Refills: 3 | Status: SHIPPED | OUTPATIENT
Start: 2023-10-30

## 2023-11-01 ENCOUNTER — TELEPHONE (OUTPATIENT)
Dept: GYNECOLOGIC ONCOLOGY | Facility: CLINIC | Age: 73
End: 2023-11-01
Payer: MEDICARE

## 2023-11-01 NOTE — TELEPHONE ENCOUNTER
"  Caller: Bancroft, Patricia F \"Pat\"    Relationship: Self    Best call back number: [phone]       Who are you requesting to speak with (clinical staff, provider,  specific staff member): CLINICAL      What was the call regarding: PATIENT CALLING TO CLARIFY WHICH PROVIDER MD MENSAH WOULD PREFER PATIENT SEE AT OBGYN AND IF SHE WILL BE FOLLOWING UP YEARLY OR ON A 6 MONTH BASIS  "

## 2023-11-03 NOTE — TELEPHONE ENCOUNTER
RN called patient back to let her know that Dr. Hill recommends that she see Dr. Rhea Okeefe at [facility] and she can follow up on a yearly basis since it has been 2 years. Patient requested that RN send her a message in [employer] with that info, v/u and appreciation.

## 2023-11-07 ENCOUNTER — TELEPHONE (OUTPATIENT)
Dept: GYNECOLOGIC ONCOLOGY | Facility: CLINIC | Age: 73
End: 2023-11-07
Payer: MEDICARE

## 2023-11-07 NOTE — TELEPHONE ENCOUNTER
"  Caller: Bancroft, Patricia F \"Pat\"    Relationship: Self    Best call back number: [phone]    What is the best time to reach you: ANY    Who are you requesting to speak with (clinical staff, provider,  specific staff member): CLINICAL        What was the call regarding: PATIENT CALLED ABOUT EXTERNAL REFERRAL TO OBGYN. PATIENT SAID THIS IS THE \"3RD TIME SHE'S CALLED\"    Is it okay if the provider responds through MyChart: NO    "

## 2023-11-07 NOTE — TELEPHONE ENCOUNTER
Spoke with patient. She was seen here on 10/26/23. Next appointment should be 10/27/24 or after. I will make note in referral.

## 2023-12-13 DIAGNOSIS — I10 ESSENTIAL HYPERTENSION: ICD-10-CM

## 2023-12-13 RX ORDER — METOPROLOL SUCCINATE 25 MG/1
25 TABLET, EXTENDED RELEASE ORAL DAILY
Qty: 90 TABLET | Refills: 1 | Status: SHIPPED | OUTPATIENT
Start: 2023-12-13

## 2023-12-13 NOTE — TELEPHONE ENCOUNTER
Lab Results   Component Value Date    GLUCOSE 74 08/08/2022    CALCIUM 9.5 08/08/2022     08/08/2022    K 4.6 08/08/2022    CO2 25.0 08/08/2022     08/08/2022    BUN 13 08/08/2022    CREATININE 0.73 08/08/2022    EGFR 87.5 08/08/2022    BCR 17.8 08/08/2022    ANIONGAP 10.0 08/08/2022

## 2024-02-01 DIAGNOSIS — K21.9 GASTROESOPHAGEAL REFLUX DISEASE, UNSPECIFIED WHETHER ESOPHAGITIS PRESENT: ICD-10-CM

## 2024-02-01 RX ORDER — FAMOTIDINE 10 MG
10 TABLET ORAL 2 TIMES DAILY
Qty: 90 TABLET | Refills: 3 | Status: SHIPPED | OUTPATIENT
Start: 2024-02-01 | End: 2024-02-01 | Stop reason: SDUPTHER

## 2024-02-01 RX ORDER — FAMOTIDINE 20 MG/1
20 TABLET, FILM COATED ORAL 2 TIMES DAILY
Qty: 180 TABLET | Refills: 3 | Status: SHIPPED | OUTPATIENT
Start: 2024-02-01 | End: 2024-02-02 | Stop reason: SDUPTHER

## 2024-02-02 ENCOUNTER — TELEPHONE (OUTPATIENT)
Dept: INTERNAL MEDICINE | Facility: CLINIC | Age: 74
End: 2024-02-02

## 2024-02-02 DIAGNOSIS — K21.9 GASTROESOPHAGEAL REFLUX DISEASE, UNSPECIFIED WHETHER ESOPHAGITIS PRESENT: ICD-10-CM

## 2024-02-02 RX ORDER — FAMOTIDINE 20 MG/1
20 TABLET, FILM COATED ORAL 2 TIMES DAILY
Qty: 180 TABLET | Refills: 3 | Status: SHIPPED | OUTPATIENT
Start: 2024-02-02

## 2024-02-02 NOTE — TELEPHONE ENCOUNTER
She just sent me this message saying that it should be 20 mg:      I'll send it to mail order regardless.  Let me know if she needs anything else.  Thanks.

## 2024-02-02 NOTE — TELEPHONE ENCOUNTER
"  Caller: Bancroft, Patricia F \"Pat\"    Relationship: Self    Best call back number: [phone]    What is the best time to reach you: ANYTIME    Who are you requesting to speak with (clinical staff, provider,  specific staff member): CLINICAL STAFF    Do you know the name of the person who called: PATIENT/ PAT    What was the call regarding: WRONG DOSAGE OF FAMOTIDINE WAS SENT TO WRONG PHARMACY.  DOSAGE SHOULD BE 10 MG AND PHARMACY SHOULD BE [facility] [address] - [phone] [facility] [phone] FX     Is it okay if the provider responds through [employer]:         "

## 2024-02-07 ENCOUNTER — OFFICE VISIT (OUTPATIENT)
Dept: CARDIOLOGY | Facility: CLINIC | Age: 74
End: 2024-02-07
Payer: MEDICARE

## 2024-02-07 VITALS
DIASTOLIC BLOOD PRESSURE: 82 MMHG | OXYGEN SATURATION: 99 % | BODY MASS INDEX: 21 KG/M2 | HEIGHT: 67 IN | HEART RATE: 66 BPM | WEIGHT: 133.8 LBS | SYSTOLIC BLOOD PRESSURE: 134 MMHG

## 2024-02-07 DIAGNOSIS — I25.10 CORONARY ARTERY DISEASE INVOLVING NATIVE CORONARY ARTERY OF NATIVE HEART WITHOUT ANGINA PECTORIS: Primary | ICD-10-CM

## 2024-02-07 DIAGNOSIS — I10 ESSENTIAL HYPERTENSION: ICD-10-CM

## 2024-02-07 DIAGNOSIS — E78.2 MIXED HYPERLIPIDEMIA: ICD-10-CM

## 2024-02-07 PROCEDURE — 3075F SYST BP GE 130 - 139MM HG: CPT | Performed by: INTERNAL MEDICINE

## 2024-02-07 PROCEDURE — 3079F DIAST BP 80-89 MM HG: CPT | Performed by: INTERNAL MEDICINE

## 2024-02-07 PROCEDURE — 99214 OFFICE O/P EST MOD 30 MIN: CPT | Performed by: INTERNAL MEDICINE

## 2024-02-07 NOTE — PROGRESS NOTES
[facility] at [facility]  Office visit  Patricia F Bancroft  1950  [phone]    VISIT DATE:  2/7/2024      PCP: Franco Whittaker MD  [id number]    CC:  Chief Complaint   Patient presents with    Coronary Artery Disease     Coronary artery disease involving native coronary artery of native heart without angina pectoris           PROBLEM LIST:  Coronary artery disease:  Status post drug-eluting stent to right coronary artery, May 2014, with no significant residual left system disease (EF 65%).  Essential Hypertension.  Dyslipidemia.  Seasonal allergies/allergic rhinitis.  History of remote tobacco use.  Hyperglycemia.  Surgical history:  Benign breast biopsy x2.  Hysterectomy with ovary removal.      September 2022 TTE  Left ventricular ejection fraction appears to be 56 - 60%. Left ventricular systolic function is normal.  Left ventricular wall thickness is consistent with mild septal asymmetric hypertrophy. Sigmoid-shaped ventricular septum is present.  Left ventricular diastolic function is consistent with (grade Ia w/high LAP) impaired relaxation.  No significant valve heart disease    ASSESSMENT:   Diagnosis Plan   1. Coronary artery disease involving native coronary artery of native heart without angina pectoris        2. Essential hypertension        3. Mixed hyperlipidemia              PLAN:  Coronary disease: Stable asymptomatic.  Continue aspirin 162 mg by mouth daily, high intensity statin therapy and afterload reduction with combination of lisinopril and metoprolol.    Hypertension: Goal less than 130/80.  Currently controlled.  Continue current medical therapy.    Hyperlipidemia: Goal LDL <70, continue rosuvastatin 40 mg p.o. daily.  Fasting lipid profile pending.    Aortic sclerosis without stenosis.    Palpitations: No high risk clinical features.  Continue beta-blockade.  Previously developed skin reaction to ambulatory ECG  "monitor.    Subjective  She is exercising on a regular basis without difficulty [facility].  Denies chest discomfort.  No dyspnea.  Blood pressures running less than 130/80 mmHg.  She is compliant with medical therapy.    PHYSICAL EXAMINATION:  Vitals:    02/07/24 1022   BP: 134/82   BP Location: Left arm   Patient Position: Sitting   Cuff Size: Adult   Pulse: 66   SpO2: 99%   Weight: 60.7 kg (133 lb 12.8 oz)   Height: 170.2 cm (67\")       General Appearance:    Alert, cooperative, no distress, appears stated age   Head:    Normocephalic, without obvious abnormality, atraumatic   Eyes:    conjunctiva/corneas clear   Nose:   Nares normal, septum midline, mucosa normal, no drainage   Throat:   Lips, teeth and gums normal   Neck:   Supple, symmetrical, trachea midline, no carotid    bruit or JVD   Lungs:     Clear to auscultation bilaterally, respirations unlabored   Chest Wall:    No tenderness or deformity    Heart:    Regular rate and rhythm, S1 and S2 normal, I/6 early peaking systolic murmur right upper sternal border, rub   or gallop, normal carotid impulse bilaterally without bruit.   Abdomen:     Soft, non-tender   Extremities:   Extremities normal, atraumatic, no cyanosis or edema   Pulses:   2+ and symmetric all extremities   Skin:   Skin color, texture, turgor normal, no rashes or lesions       Diagnostic Data:  Procedures  Lab Results   Component Value Date    CHLPL 172 04/27/2016    TRIG 95 08/08/2022    HDL 64 (H) 08/08/2022     Lab Results   Component Value Date    GLUCOSE 74 08/08/2022    BUN 13 08/08/2022    CREATININE 0.73 08/08/2022     08/08/2022    K 4.6 08/08/2022     08/08/2022    CO2 25.0 08/08/2022     Lab Results   Component Value Date    HGBA1C 5.6 06/20/2023     Lab Results   Component Value Date    WBC 8.00 03/25/2021    HGB 16.0 (H) 03/25/2021    HCT 48.9 (H) 03/25/2021     03/25/2021       Allergies  Allergies   Allergen Reactions    Other Other (See Comments) "    Procaine Other (See Comments)    Ciprofloxacin Myalgia and Other (See Comments)    Dust Mite Extract Cough, Itching and Other (See Comments)     Household Dust, and Animal Dander    Ibuprofen Hives       Current Medications    Current Outpatient Medications:     aspirin 162 MG EC tablet, Take 81 mg by mouth Daily., Disp: , Rfl:     cetirizine (zyrTEC) 10 MG tablet, Take 1 tablet by mouth Daily., Disp: , Rfl:     estradiol (VIVELLE-DOT) 0.025 MG/24HR patch, Place 1 patch on the skin as directed by provider 2 (Two) Times a Week., Disp: 24 each, Rfl: 3    famotidine (PEPCID) 20 MG tablet, Take 1 tablet by mouth 2 (Two) Times a Day., Disp: 180 tablet, Rfl: 3    fluticasone (FLONASE) 50 MCG/ACT nasal spray, 2 sprays into the nostril(s) as directed by provider Daily., Disp: , Rfl:     lisinopril (PRINIVIL,ZESTRIL) 10 MG tablet, Take 1 tablet by mouth Daily., Disp: 90 tablet, Rfl: 3    metoprolol succinate XL (TOPROL-XL) 25 MG 24 hr tablet, Take 1 tablet by mouth Daily., Disp: 90 tablet, Rfl: 1    Polyethylene Glycol 3350 (MIRALAX PO), Take  by mouth., Disp: , Rfl:     rosuvastatin (CRESTOR) 40 MG tablet, Take 1 tablet by mouth Daily., Disp: 90 tablet, Rfl: 3          ROS  Review of Systems   Cardiovascular:  Negative for chest pain, dyspnea on exertion and irregular heartbeat.   Respiratory:  Negative for cough.          SOCIAL HX  Social History     Socioeconomic History    Marital status:    Tobacco Use    Smoking status: Former     Packs/day: 1.50     Years: 43.00     Additional pack years: 0.00     Total pack years: 64.50     Types: Cigarettes     Start date: 1966     Quit date: 2009     Years since quittin.4    Smokeless tobacco: Never   Vaping Use    Vaping Use: Never used   Substance and Sexual Activity    Alcohol use: No     Comment: Quit     Drug use: No     Types: Marijuana     Comment: as a teenager    Sexual activity: Yes     Partners: Male     Birth control/protection:  Post-menopausal       FAMILY HX  Family History   Problem Relation Age of Onset    Cancer Mother         Lung cancer;  Dec 1996    Lung cancer Mother     Cancer Father         Primary site unknown;  1996    Prostate cancer Father     Heart disease Father     Cancer Sister         Pancreatic cancer;      Pancreatic cancer Sister     Heart disease Maternal Grandfather     Diabetes Cousin     Stroke Maternal Aunt     Breast cancer Neg Hx     Ovarian cancer Neg Hx     Uterine cancer Neg Hx     Colon cancer Neg Hx     Melanoma Neg Hx              Ghanshyam Padron III, MD, FACC

## 2024-03-06 ENCOUNTER — OFFICE VISIT (OUTPATIENT)
Dept: INTERNAL MEDICINE | Facility: CLINIC | Age: 74
End: 2024-03-06
Payer: MEDICARE

## 2024-03-06 VITALS
HEIGHT: 67 IN | WEIGHT: 139 LBS | OXYGEN SATURATION: 97 % | SYSTOLIC BLOOD PRESSURE: 130 MMHG | HEART RATE: 72 BPM | DIASTOLIC BLOOD PRESSURE: 80 MMHG | BODY MASS INDEX: 21.82 KG/M2 | RESPIRATION RATE: 20 BRPM

## 2024-03-06 DIAGNOSIS — E13.40 NEUROPATHY DUE TO SECONDARY DIABETES: ICD-10-CM

## 2024-03-06 DIAGNOSIS — Z78.0 POSTMENOPAUSE: ICD-10-CM

## 2024-03-06 DIAGNOSIS — Z12.31 ENCOUNTER FOR SCREENING MAMMOGRAM FOR MALIGNANT NEOPLASM OF BREAST: ICD-10-CM

## 2024-03-06 DIAGNOSIS — G62.9 POLYNEUROPATHY: ICD-10-CM

## 2024-03-06 DIAGNOSIS — Z00.00 MEDICARE ANNUAL WELLNESS VISIT, SUBSEQUENT: Primary | ICD-10-CM

## 2024-03-06 DIAGNOSIS — R73.03 PREDIABETES: ICD-10-CM

## 2024-03-06 DIAGNOSIS — E11.8 DIABETIC FOOT: ICD-10-CM

## 2024-03-06 DIAGNOSIS — J98.4 PULMONARY CALCIFICATION DETERMINED BY X-RAY: ICD-10-CM

## 2024-03-06 DIAGNOSIS — Z23 ENCOUNTER FOR VACCINATION: ICD-10-CM

## 2024-03-06 DIAGNOSIS — N95.1 POST MENOPAUSAL SYNDROME: ICD-10-CM

## 2024-03-06 DIAGNOSIS — N90.3 VULVAR DYSPLASIA: ICD-10-CM

## 2024-03-06 DIAGNOSIS — E78.5 HYPERLIPIDEMIA LDL GOAL <70: ICD-10-CM

## 2024-03-06 DIAGNOSIS — Z12.2 ENCOUNTER FOR SCREENING FOR LUNG CANCER: ICD-10-CM

## 2024-03-06 DIAGNOSIS — I10 ESSENTIAL HYPERTENSION: ICD-10-CM

## 2024-03-06 DIAGNOSIS — Z87.891 PERSONAL HISTORY OF NICOTINE DEPENDENCE: ICD-10-CM

## 2024-03-06 PROBLEM — E11.42 DIABETIC POLYNEUROPATHY ASSOCIATED WITH TYPE 2 DIABETES MELLITUS: Status: RESOLVED | Noted: 2023-01-17 | Resolved: 2024-03-06

## 2024-03-06 PROBLEM — R14.0 ABDOMINAL BLOATING: Status: RESOLVED | Noted: 2020-12-01 | Resolved: 2024-03-06

## 2024-03-06 PROBLEM — L84 FOOT CALLUS: Status: ACTIVE | Noted: 2024-03-06

## 2024-03-06 LAB
ALBUMIN SERPL-MCNC: 4.2 G/DL (ref 3.5–5.2)
ALBUMIN UR-MCNC: <1.2 MG/DL
ALBUMIN/GLOB SERPL: 1.8 G/DL
ALP SERPL-CCNC: 42 U/L (ref 39–117)
ALT SERPL W P-5'-P-CCNC: 17 U/L (ref 1–33)
ANION GAP SERPL CALCULATED.3IONS-SCNC: 9 MMOL/L (ref 5–15)
AST SERPL-CCNC: 23 U/L (ref 1–32)
BILIRUB SERPL-MCNC: 0.5 MG/DL (ref 0–1.2)
BUN SERPL-MCNC: 15 MG/DL (ref 8–23)
BUN/CREAT SERPL: 17.9 (ref 7–25)
CALCIUM SPEC-SCNC: 9.1 MG/DL (ref 8.6–10.5)
CHLORIDE SERPL-SCNC: 107 MMOL/L (ref 98–107)
CHOLEST SERPL-MCNC: 182 MG/DL (ref 0–200)
CO2 SERPL-SCNC: 25 MMOL/L (ref 22–29)
CREAT SERPL-MCNC: 0.84 MG/DL (ref 0.57–1)
CREAT UR-MCNC: 22 MG/DL
EGFRCR SERPLBLD CKD-EPI 2021: 73.5 ML/MIN/1.73
EXPIRATION DATE: NORMAL
GLOBULIN UR ELPH-MCNC: 2.4 GM/DL
GLUCOSE SERPL-MCNC: 90 MG/DL (ref 65–99)
HBA1C MFR BLD: 5.6 % (ref 4.5–5.7)
HDLC SERPL-MCNC: 78 MG/DL (ref 40–60)
LDLC SERPL CALC-MCNC: 93 MG/DL (ref 0–100)
LDLC/HDLC SERPL: 1.18 {RATIO}
Lab: NORMAL
MICROALBUMIN/CREAT UR: NORMAL MG/G{CREAT}
POTASSIUM SERPL-SCNC: 4.4 MMOL/L (ref 3.5–5.2)
PROT SERPL-MCNC: 6.6 G/DL (ref 6–8.5)
SODIUM SERPL-SCNC: 141 MMOL/L (ref 136–145)
TRIGL SERPL-MCNC: 58 MG/DL (ref 0–150)
VLDLC SERPL-MCNC: 11 MG/DL (ref 5–40)

## 2024-03-06 PROCEDURE — 80061 LIPID PANEL: CPT | Performed by: STUDENT IN AN ORGANIZED HEALTH CARE EDUCATION/TRAINING PROGRAM

## 2024-03-06 PROCEDURE — 80053 COMPREHEN METABOLIC PANEL: CPT | Performed by: STUDENT IN AN ORGANIZED HEALTH CARE EDUCATION/TRAINING PROGRAM

## 2024-03-06 PROCEDURE — 82570 ASSAY OF URINE CREATININE: CPT | Performed by: STUDENT IN AN ORGANIZED HEALTH CARE EDUCATION/TRAINING PROGRAM

## 2024-03-06 PROCEDURE — 82043 UR ALBUMIN QUANTITATIVE: CPT | Performed by: STUDENT IN AN ORGANIZED HEALTH CARE EDUCATION/TRAINING PROGRAM

## 2024-03-06 NOTE — PATIENT INSTRUCTIONS
Health Maintenance for Postmenopausal Women  Menopause is a normal process in which your reproductive ability comes to an end. This process happens gradually over a span of months to years, usually between the ages of 48 and 55. Menopause is complete when you have missed 12 consecutive menstrual periods.  It is important to talk with your health care provider about some of the most common conditions that affect postmenopausal women, such as heart disease, cancer, and bone loss (osteoporosis). Adopting a healthy lifestyle and getting preventive care can help to promote your health and wellness. Those actions can also lower your chances of developing some of these common conditions.  What should I know about menopause?  During menopause, you may experience a number of symptoms, such as:  Moderate-to-severe hot flashes.  Night sweats.  Decrease in sex drive.  Mood swings.  Headaches.  Tiredness.  Irritability.  Memory problems.  Insomnia.     Choosing to treat or not to treat menopausal changes is an individual decision that you make with your health care provider.  What should I know about hormone replacement therapy and supplements?  Hormone therapy products are effective for treating symptoms that are associated with menopause, such as hot flashes and night sweats. Hormone replacement carries certain risks, especially as you become older. If you are thinking about using estrogen or estrogen with progestin treatments, discuss the benefits and risks with your health care provider.  What should I know about heart disease and stroke?  Heart disease, heart attack, and stroke become more likely as you age. This may be due, in part, to the hormonal changes that your body experiences during menopause. These can affect how your body processes dietary fats, triglycerides, and cholesterol. Heart attack and stroke are both medical emergencies.    There are many things that you can do to help prevent heart disease and  stroke:  Have your blood pressure checked at least every 1-2 years. High blood pressure causes heart disease and increases the risk of stroke.  If you are 55-79 years old, ask your health care provider if you should take aspirin to prevent a heart attack or a stroke.  Do not use any tobacco products, including cigarettes, chewing tobacco, or electronic cigarettes. If you need help quitting, ask your health care provider.  It is important to eat a healthy diet and maintain a healthy weight.  Be sure to include plenty of vegetables, fruits, low-fat dairy products, and lean protein.  Avoid eating foods that are high in solid fats, added sugars, or salt (sodium).  Get regular exercise. This is one of the most important things that you can do for your health.  Try to exercise for at least 150 minutes each week. The type of exercise that you do should increase your heart rate and make you sweat. This is known as moderate-intensity exercise.  Try to do strengthening exercises at least twice each week. Do these in addition to the moderate-intensity exercise.  Know your numbers. Ask your health care provider to check your cholesterol and your blood glucose. Continue to have your blood tested as directed by your health care provider.     What should I know about cancer screening?  There are several types of cancer. Take the following steps to reduce your risk and to catch any cancer development as early as possible.  Breast Cancer  Practice breast self-awareness.  This means understanding how your breasts normally appear and feel.  It also means doing regular breast self-exams. Let your health care provider know about any changes, no matter how small.  If you are 40 or older, have a clinician do a breast exam (clinical breast exam or CBE) every year. Depending on your age, family history, and medical history, it may be recommended that you also have a yearly breast X-ray (mammogram).  If you have a family history of breast  cancer, talk with your health care provider about genetic screening.  If you are at high risk for breast cancer, talk with your health care provider about having an MRI and a mammogram every year.  Breast cancer (BRCA) gene test is recommended for women who have family members with BRCA-related cancers. Results of the assessment will determine the need for genetic counseling and BRCA1 and for BRCA2 testing. BRCA-related cancers include these types:  Breast. This occurs in males or females.  Ovarian.  Tubal. This may also be called fallopian tube cancer.  Cancer of the abdominal or pelvic lining (peritoneal cancer).  Prostate.  Pancreatic.     Cervical, Uterine, and Ovarian Cancer  Your health care provider may recommend that you be screened regularly for cancer of the pelvic organs. These include your ovaries, uterus, and vagina. This screening involves a pelvic exam, which includes checking for microscopic changes to the surface of your cervix (Pap test).  For women ages 21-65, health care providers may recommend a pelvic exam and a Pap test every three years. For women ages 30-65, they may recommend the Pap test and pelvic exam, combined with testing for human papilloma virus (HPV), every five years. Some types of HPV increase your risk of cervical cancer. Testing for HPV may also be done on women of any age who have unclear Pap test results.  Other health care providers may not recommend any screening for nonpregnant women who are considered low risk for pelvic cancer and have no symptoms. Ask your health care provider if a screening pelvic exam is right for you.  If you have had past treatment for cervical cancer or a condition that could lead to cancer, you need Pap tests and screening for cancer for at least 20 years after your treatment. If Pap tests have been discontinued for you, your risk factors (such as having a new sexual partner) need to be reassessed to determine if you should start having screenings  again. Some women have medical problems that increase the chance of getting cervical cancer. In these cases, your health care provider may recommend that you have screening and Pap tests more often.  If you have a family history of uterine cancer or ovarian cancer, talk with your health care provider about genetic screening.  If you have vaginal bleeding after reaching menopause, tell your health care provider.  There are currently no reliable tests available to screen for ovarian cancer.     Lung Cancer  Lung cancer screening is recommended for adults 55-80 years old who are at high risk for lung cancer because of a history of smoking. A yearly low-dose CT scan of the lungs is recommended if you:  Currently smoke.  Have a history of at least 30 pack-years of smoking and you currently smoke or have quit within the past 15 years. A pack-year is smoking an average of one pack of cigarettes per day for one year.     Yearly screening should:  Continue until it has been 15 years since you quit.  Stop if you develop a health problem that would prevent you from having lung cancer treatment.     Colorectal Cancer  This type of cancer can be detected and can often be prevented.  Routine colorectal cancer screening usually begins at age 50 and continues through age 75.  If you have risk factors for colon cancer, your health care provider may recommend that you be screened at an earlier age.  If you have a family history of colorectal cancer, talk with your health care provider about genetic screening.  Your health care provider may also recommend using home test kits to check for hidden blood in your stool.  A small camera at the end of a tube can be used to examine your colon directly (sigmoidoscopy or colonoscopy). This is done to check for the earliest forms of colorectal cancer.  Direct examination of the colon should be repeated every 5-10 years until age 75. However, if early forms of precancerous polyps or small  growths are found or if you have a family history or genetic risk for colorectal cancer, you may need to be screened more often.     Skin Cancer  Check your skin from head to toe regularly.  Monitor any moles. Be sure to tell your health care provider:  About any new moles or changes in moles, especially if there is a change in a mole's shape or color.  If you have a mole that is larger than the size of a pencil eraser.  If any of your family members has a history of skin cancer, especially at a young age, talk with your health care provider about genetic screening.  Always use sunscreen. Apply sunscreen liberally and repeatedly throughout the day.  Whenever you are outside, protect yourself by wearing long sleeves, pants, a wide-brimmed hat, and sunglasses.     What should I know about osteoporosis?  Osteoporosis is a condition in which bone destruction happens more quickly than new bone creation. After menopause, you may be at an increased risk for osteoporosis. To help prevent osteoporosis or the bone fractures that can happen because of osteoporosis, the following is recommended:  If you are 19-50 years old, get at least 1,000 mg of calcium and at least 600 mg of vitamin D per day.  If you are older than age 50 but younger than age 70, get at least 1,200 mg of calcium and at least 600 mg of vitamin D per day.  If you are older than age 70, get at least 1,200 mg of calcium and at least 800 mg of vitamin D per day.     Smoking and excessive alcohol intake increase the risk of osteoporosis. Eat foods that are rich in calcium and vitamin D, and do weight-bearing exercises several times each week as directed by your health care provider.  What should I know about how menopause affects my mental health?  Depression may occur at any age, but it is more common as you become older. Common symptoms of depression include:  Low or sad mood.  Changes in sleep patterns.  Changes in appetite or eating patterns.  Feeling an  overall lack of motivation or enjoyment of activities that you previously enjoyed.  Frequent crying spells.     Talk with your health care provider if you think that you are experiencing depression.  What should I know about immunizations?  It is important that you get and maintain your immunizations. These include:  Tetanus, diphtheria, and pertussis (Tdap) booster vaccine.  Influenza every year before the flu season begins.  Pneumonia vaccine.  Shingles vaccine.     Your health care provider may also recommend other immunizations.  This information is not intended to replace advice given to you by your health care provider. Make sure you discuss any questions you have with your health care provider.  Document Released: 02/09/2007 Document Revised: 07/07/2017 Document Reviewed: 09/20/2016  [employer] Interactive Patient Education © 2018 [employer]            Healthy Delicious Recipes from [employer]: https://[employer].org/  [place] Plant Based Meal Recipes: https://[employer].Next audience/  Heart Healthy Recipes from [employer]: https://abcardio.org/wp-content/uploads/2020/06/ABC_Cookbook.pdf  [place] Healthy Living Guide: https://www.hsp.[place].edu/nutritionsource/2022/01/06/[email or web address]-guide-5650-3636/  [employer] Cookbook for Budgets: http://ongov.net/dss/documents/good-and-cheap.pdf

## 2024-03-06 NOTE — PROGRESS NOTES
The ABCs of the Annual Wellness Visit  Subsequent Medicare Wellness Visit    Subjective    Patricia F Bancroft is a 73 y.o. female who presents for a Subsequent Medicare Wellness Visit.    The following portions of the patient's history were reviewed and   updated as appropriate: allergies, current medications, past family history, past medical history, past social history, past surgical history, and problem list.    Compared to one year ago, the patient feels her physical   health is better.    Compared to one year ago, the patient feels her mental   health is better.    Recent Hospitalizations:  She was not admitted to the hospital during the last year.       Current Medical Providers:  Patient Care Team:  Fracno Whittaker MD as PCP - General (Family Medicine)  Nitish Carmona MD as Consulting Physician (Colon and Rectal Surgery)  Dennis Blackwood DPM as Consulting Physician (Podiatry)  Ghanshyam Padron III, MD as Cardiologist (Cardiology)    Outpatient Medications Prior to Visit   Medication Sig Dispense Refill   • aspirin 162 MG EC tablet Take 81 mg by mouth Daily.     • cetirizine (zyrTEC) 10 MG tablet Take 1 tablet by mouth Daily.     • estradiol (VIVELLE-DOT) 0.025 MG/24HR patch Place 1 patch on the skin as directed by provider 2 (Two) Times a Week. 24 each 3   • famotidine (PEPCID) 20 MG tablet Take 1 tablet by mouth 2 (Two) Times a Day. 180 tablet 3   • fluticasone (FLONASE) 50 MCG/ACT nasal spray 2 sprays into the nostril(s) as directed by provider Daily.     • lisinopril (PRINIVIL,ZESTRIL) 10 MG tablet Take 1 tablet by mouth Daily. 90 tablet 3   • metoprolol succinate XL (TOPROL-XL) 25 MG 24 hr tablet Take 1 tablet by mouth Daily. 90 tablet 1   • Polyethylene Glycol 3350 (MIRALAX PO) Take  by mouth.     • rosuvastatin (CRESTOR) 40 MG tablet Take 1 tablet by mouth Daily. 90 tablet 3     No facility-administered medications prior to visit.       No opioid medication identified on active medication list. I have  "reviewed chart for other potential  high risk medication/s and harmful drug interactions in the elderly.        Aspirin is on active medication list. Aspirin use is indicated based on review of current medical condition/s. Pros and cons of this therapy have been discussed today. Benefits of this medication outweigh potential harm.  Patient has been encouraged to continue taking this medication.  .      Patient Active Problem List   Diagnosis   • Coronary artery disease involving native coronary artery of native heart without angina pectoris   • Essential hypertension   • Hyperlipidemia LDL goal <70   • Prediabetes   • Allergic rhinitis   • Chronic idiopathic constipation   • Gastroesophageal reflux disease   • Allergic rhinitis due to animal dander   • Combined form of senile cataract   • Myopia   • Non-toxic uninodular goiter   • Posterior vitreous detachment   • Presbyopia   • Regular astigmatism   • Vulvar dysplasia   • Medicare annual wellness visit, subsequent   • Post menopausal syndrome   • Herpes simplex vulvovaginitis   • Pulmonary calcification determined by X-ray   • Polyneuropathy   • Diabetic foot     Advance Care Planning   Advance Care Planning     Advance Directive is not on file.  ACP discussion was held with the patient during this visit. Patient does not have an advance directive, information provided.     Objective    Vitals:    03/06/24 0802   BP: 130/80   Pulse: 72   Resp: 20   SpO2: 97%   Weight: 63 kg (139 lb)   Height: 170.2 cm (67.01\")   PainSc: 0-No pain     Estimated body mass index is 21.77 kg/m² as calculated from the following:    Height as of this encounter: 170.2 cm (67.01\").    Weight as of this encounter: 63 kg (139 lb).    BMI is within normal parameters. No other follow-up for BMI required.      Does the patient have evidence of cognitive impairment? No    Lab Results   Component Value Date    HGBA1C 5.6 03/06/2024        HEALTH RISK ASSESSMENT    Smoking Status:  Social History "     Tobacco Use   Smoking Status Former   • Current packs/day: 0.00   • Average packs/day: 1.5 packs/day for 43.6 years (65.4 ttl pk-yrs)   • Types: Cigarettes   • Start date: 1966   • Quit date: 2009   • Years since quittin.5   Smokeless Tobacco Never     Alcohol Consumption:  Social History     Substance and Sexual Activity   Alcohol Use No    Comment: Quit      Fall Risk Screen:    JASS Fall Risk Assessment was completed, and patient is at LOW risk for falls.Assessment completed on:3/6/2024    Depression Screening:      3/6/2024     8:06 AM   PHQ-2/PHQ-9 Depression Screening   Little Interest or Pleasure in Doing Things 0-->not at all   Feeling Down, Depressed or Hopeless 0-->not at all   PHQ-9: Brief Depression Severity Measure Score 0       Health Habits and Functional and Cognitive Screening:      3/6/2024     8:04 AM   Functional & Cognitive Status   Do you have difficulty preparing food and eating? No   Do you have difficulty bathing yourself, getting dressed or grooming yourself? No   Do you have difficulty using the toilet? No   Do you have difficulty moving around from place to place? No   Do you have trouble with steps or getting out of a bed or a chair? No   Current Diet Well Balanced Diet   Dental Exam Up to date   Eye Exam Up to date   Exercise (times per week) 3 times per week   Current Exercises Include Walking;House Cleaning;Other   Do you need help using the phone?  No   Are you deaf or do you have serious difficulty hearing?  No   Do you need help to go to places out of walking distance? No   Do you need help shopping? No   Do you need help preparing meals?  No   Do you need help with housework?  No   Do you need help with laundry? No   Do you need help taking your medications? No   Do you need help managing money? No   Do you ever drive or ride in a car without wearing a seat belt? No   Have you felt unusual stress, anger or loneliness in the last month? No   Who do you live  with? Alone   If you need help, do you have trouble finding someone available to you? No   Have you been bothered in the last four weeks by sexual problems? No   Do you have difficulty concentrating, remembering or making decisions? No       Age-appropriate Screening Schedule:  Refer to the list below for future screening recommendations based on patient's age, sex and/or medical conditions. Orders for these recommended tests are listed in the plan section. The patient has been provided with a written plan.    Health Maintenance   Topic Date Due   • RSV Vaccine - Adults (1 - 1-dose 60+ series) Never done   • TDAP/TD VACCINES (2 - Td or Tdap) 08/01/2022   • URINE MICROALBUMIN  06/29/2023   • LIPID PANEL  08/08/2023   • COVID-19 Vaccine (7 - 2023-24 season) 09/01/2023   • DIABETIC EYE EXAM  06/29/2024   • DXA SCAN  08/29/2024   • LUNG CANCER SCREENING  08/31/2024   • HEMOGLOBIN A1C  09/06/2024   • ANNUAL WELLNESS VISIT  03/06/2025   • MAMMOGRAM  08/31/2025   • COLORECTAL CANCER SCREENING  10/27/2026   • HEPATITIS C SCREENING  Completed   • INFLUENZA VACCINE  Completed   • Pneumococcal Vaccine 65+  Completed   • DIABETIC FOOT EXAM  Discontinued   • PAP SMEAR  Discontinued   • ZOSTER VACCINE  Discontinued                  CMS Preventative Services Quick Reference  Risk Factors Identified During Encounter  Dental Screening Recommended  Vision Screening Recommended  The above risks/problems have been discussed with the patient.  Pertinent information has been shared with the patient in the After Visit Summary.  An After Visit Summary and PPPS were made available to the patient.    Follow Up:   Next Medicare Wellness visit to be scheduled in 1 year.       Additional E&M Note during same encounter follows:  Patient has multiple medical problems which are significant and separately identifiable that require additional work above and beyond the Medicare Wellness Visit.      Chief Complaint  Medicare  Wellness-subsequent    Subjective        HPI  History of Present Illness  The patient presents for Medicare wellness.    Her weight is stable. When she saw Dr. Padron a few weeks ago, her BMI was 20.    She has had trouble with her feet for years. She mentioned some numbness the last time she was here. She thinks she might have a corn on her right foot. It is hard to walk on the right foot. Both feet still have patches of numbness. The top of her leg is numb. She first noticed the leg issue early last summer. It has not gotten any worse. The foot issue has been going on for the last couple of months with the callus. She can not walk on a hard floor. She is wondering if she needs a different kind of orthotic device. She is banging her feet in exercise classes too much. She has been using a little pumice stone to get it down a bit, but it splits and dries out. She has a hunk of dry skin. It hurts when it gets swollen up. She has whiteness. She has hammertoe on the left foot. She does not want to take medications for it.    She has some numbness in the genital area that was not there before. It concerns her because she can not tell when she needs to go to the bathroom sometimes. She denies any radiation of pain from her back. Occasionally, her back is tired, and she puts a heating pad on it. It is not sciatica or cramp.    She quit smoking in 2009. She follows with cardiology, Dr. Ghanshyam Padron. She is on rosuvastatin for coronary artery disease. She follows with gynecology for vulvar dysplasia and estrogen therapy. She is scheduled with a new gynecologist in 11/2024. The patches do not work well for her. She has miserable hot flashes. She feels much better physically and mentally compared to last year. She feels safe around him. She has an eye doctor and a dentist. She saw her dermatologist on Monday. She can not get water out of her ears when she is in the shower.    Immunizations:  Immunization History   Administered  Date(s) Administered   • COVID-19 (PFIZER) BIVALENT 12+YRS 10/04/2022   • COVID-19 (PFIZER) Purple Cap Monovalent 01/30/2021, 02/24/2021, 09/29/2021, 03/31/2022   • FLUAD TRI 65YR+ 10/01/2019   • Fluad Quad 65+ 09/26/2020   • Fluzone High Dose =>65 Years ([employer] ONLY) 10/03/2016, 10/03/2017, 10/01/2018   • Fluzone High-Dose 65+yrs 10/09/2021, 10/04/2022, 10/02/2023   • Pneumococcal Conjugate 13-Valent (PCV13) 09/24/2015   • Pneumococcal Polysaccharide (PPSV23) 10/17/2016       Colorectal Screening:   Up-To-Date   Last Completed Colonoscopy            COLORECTAL CANCER SCREENING (COLONOSCOPY - Every 5 Years) Next due on 10/27/2026      10/27/2021  SCANNED - COLONOSCOPY    12/01/2020  COLONOSCOPY (Declined)    04/17/2017  COLONOSCOPY (Declined)                  Pap:  Up-To-Date   Last Completed Pap Smear            Discontinued - PAP SMEAR  Discontinued      01/01/2016  Done                   Mammogram:  Ordered  Last Completed Mammogram            Ordered - MAMMOGRAM (Every 2 Years) Ordered on 3/6/2024      08/31/2023  Mammo Screening Digital Tomosynthesis Bilateral With CAD    08/29/2022  Mammo Screening Digital Tomosynthesis Bilateral With CAD    08/26/2021  Mammo Screening Digital Tomosynthesis Bilateral With CAD    08/22/2020  Mammo Screening Digital Tomosynthesis Bilateral With CAD    08/21/2019  Mammo Screening Digital Tomosynthesis Bilateral With CAD    Only the first 5 history entries have been loaded, but more history exists.                     CT for Smoker (Age 50-80, 20 pk yr):  Ordered  Bone Density/DEXA (Age 65 or high risk): DEXA scan ordered  Hep C (Age 18-79 once):  previously negative  HIV (Age 15-65 once): previously negative  A1c: ordered  Lipid panel: ordered    Dermatology: established  Ophthalmologist: established  Dentist: established    Tobacco Use: Medium Risk (3/6/2024)    Patient History    • Smoking Tobacco Use: Former    • Smokeless Tobacco Use: Never    • Passive Exposure: Not on  "file       Social History     Substance and Sexual Activity   Alcohol Use No    Comment: Quit 1994        Social History     Substance and Sexual Activity   Drug Use No   • Types: Marijuana    Comment: as a teenager        Diet/Physical activity: counseled on 03/06/24    PHQ-2 Depression Screening  Little interest or pleasure in doing things? 0-->not at all   Feeling down, depressed, or hopeless? 0-->not at all   PHQ-2 Total Score 0     PHQ-9 Total Score: 0     Intimate partner violence: (Screen on initial visit, pregnant women, women with injuries, older adult with injury or evidence of neglect): no concerns  Violence can be a problem in many people's lives, so I now ask every patient about trauma or abuse they may have experienced in a relationship.  Stress/Safety - Do you feel safe in your relationship?  Afraid/Abused - Have you ever been in a relationship where you were threatened, hurt, or afraid?  Friend/Family - Are your friends aware you have been hurt?  Emergency Plan - Do you have a safe place to go and the resources you need in an emergency?    Osteoporosis: no concerns  Ost menopausal women < 65 with RF (advancing age, previous fracture, glucocorticoid therapy, parental hip fracture, low body weight, current cigarette smoking, excessive alcohol consumption, rheumatoid arthritis, secondary osteoporosis [hypogonadism/premature menopause, malabsorption, chronic liver disease, IBD]).  All women 65 or older      Review of Systems   All other systems reviewed and are negative.      Objective   Vital Signs:  /80   Pulse 72   Resp 20   Ht 170.2 cm (67.01\")   Wt 63 kg (139 lb)   SpO2 97%   BMI 21.77 kg/m²     Physical Exam  Vitals and nursing note reviewed.   Constitutional:       General: She is not in acute distress.     Appearance: Normal appearance. She is normal weight. She is not ill-appearing or toxic-appearing.   HENT:      Nose: No congestion or rhinorrhea.   Eyes:      General:         Right " eye: No discharge.         Left eye: No discharge.      Conjunctiva/sclera: Conjunctivae normal.   Cardiovascular:      Rate and Rhythm: Normal rate and regular rhythm.      Heart sounds: Normal heart sounds. No murmur heard.     No friction rub.   Pulmonary:      Effort: Pulmonary effort is normal. No respiratory distress.      Breath sounds: Normal breath sounds. No wheezing or rhonchi.   Abdominal:      General: Abdomen is flat. Bowel sounds are normal. There is no distension.      Palpations: Abdomen is soft. There is no mass.      Tenderness: There is no abdominal tenderness. There is no guarding or rebound.   Musculoskeletal:      Cervical back: Normal range of motion.      Right lower leg: No edema.      Left lower leg: No edema.      Right foot: Normal range of motion. No Charcot foot or foot drop.        Feet:    Feet:      Right foot:      Skin integrity: Callus and dry skin present.   Skin:     Findings: No lesion or rash.   Neurological:      General: No focal deficit present.      Mental Status: She is alert. Mental status is at baseline.      Coordination: Coordination normal.      Gait: Gait normal.   Psychiatric:         Mood and Affect: Mood normal.         Behavior: Behavior normal.         Thought Content: Thought content normal.         Judgment: Judgment normal.          The following data was reviewed by: Franco Whittaker MD on 03/06/2024:  Common labs          6/20/2023    11:46 3/6/2024    11:09   Common Labs   Hemoglobin A1C 5.6  5.6               Assessment and Plan   Problem List Items Addressed This Visit       Essential hypertension    Hyperlipidemia LDL goal <70    Relevant Orders    Lipid Panel    Prediabetes    Relevant Orders    Microalbumin / Creatinine Urine Ratio - Urine, Clean Catch    POC Glycosylated Hemoglobin (Hb A1C) (Completed)    Comprehensive Metabolic Panel    Vulvar dysplasia    Overview     Referred by Dr. Fabi Simmons    3/11/2021: Vulvar biopsy with GABINO 3 with  positive deep and peripheral margins.  The possibility of invasion cannot be excluded.  4/12/2021: Vulvectomy with HSIL. Negative for cancer. All margins free of HSIL.          Medicare annual wellness visit, subsequent - Primary    Relevant Orders    Code Status and Medical Interventions:    Post menopausal syndrome    Pulmonary calcification determined by X-ray    Overview     8/31/23: low risk per CT lung screen         Relevant Orders     CT Chest Low Dose Cancer Screening WO    Polyneuropathy    Relevant Orders    Ambulatory Referral to Podiatry    EMG & Nerve Conduction Test    Ambulatory Referral to Neurology    Diabetic foot    Relevant Orders    Ambulatory Referral to Podiatry     Other Visit Diagnoses       Encounter for vaccination        Relevant Medications    Tdap (BOOSTRIX) 5-2.5-18.5 LF-MCG/0.5 injection    RSVPreF3 Vac Recomb Adjuvanted (AREXVY) 120 MCG/0.5ML reconstituted suspension injection    COVID-19 mRNA Vaccine, Moderna, (Spikevax COVID-19 Vaccine) 100 MCG/0.5ML suspension    Encounter for screening mammogram for malignant neoplasm of breast        Relevant Orders    Mammo Screening Digital Tomosynthesis Bilateral With CAD    Encounter for screening for lung cancer        Relevant Orders     CT Chest Low Dose Cancer Screening WO    Personal history of nicotine dependence        Relevant Orders     CT Chest Low Dose Cancer Screening WO    Neuropathy due to secondary diabetes        Relevant Orders    EMG & Nerve Conduction Test    Postmenopause        Relevant Orders    DEXA Bone Density Axial              Diagnoses and all orders for this visit:    1. Medicare annual wellness visit, subsequent (Primary)  -     Code Status and Medical Interventions:    2. Prediabetes  -     Microalbumin / Creatinine Urine Ratio - Urine, Clean Catch; Future  -     POC Glycosylated Hemoglobin (Hb A1C); Future  -     Cancel: Basic Metabolic Panel; Future  -     Comprehensive Metabolic Panel; Future  -      Microalbumin / Creatinine Urine Ratio - Urine, Clean Catch  -     Comprehensive Metabolic Panel  -     POC Glycosylated Hemoglobin (Hb A1C)    3. Hyperlipidemia LDL goal <70  -     Lipid Panel; Future  -     Lipid Panel    4. Pulmonary calcification determined by X-ray  -      CT Chest Low Dose Cancer Screening WO; Future    5. Essential hypertension    6. Encounter for vaccination  -     Tdap (BOOSTRIX) 5-2.5-18.5 LF-MCG/0.5 injection; Inject 0.5 mL into the appropriate muscle as directed by prescriber 1 (One) Time for 1 dose.  Dispense: 0.5 mL; Refill: 0  -     RSVPreF3 Vac Recomb Adjuvanted (AREXVY) 120 MCG/0.5ML reconstituted suspension injection; Inject 0.5 mL into the appropriate muscle as directed by prescriber 1 (One) Time for 1 dose.  Dispense: 0.5 mL; Refill: 0  -     COVID-19 mRNA Vaccine, Moderna, (Spikevax COVID-19 Vaccine) 100 MCG/0.5ML suspension; Inject 0.25 mL into the appropriate muscle as directed by prescriber 1 (One) Time for 1 dose.  Dispense: 0.25 mL; Refill: 0    7. Vulvar dysplasia    8. Post menopausal syndrome    9. Polyneuropathy  -     Ambulatory Referral to Podiatry  -     EMG & Nerve Conduction Test; Future  -     Ambulatory Referral to Neurology    10. Diabetic foot  -     Ambulatory Referral to Podiatry    11. Encounter for screening mammogram for malignant neoplasm of breast  -     Mammo Screening Digital Tomosynthesis Bilateral With CAD; Future    12. Encounter for screening for lung cancer  -      CT Chest Low Dose Cancer Screening WO; Future    13. Personal history of nicotine dependence  -      CT Chest Low Dose Cancer Screening WO; Future    14. Neuropathy due to secondary diabetes  -     EMG & Nerve Conduction Test; Future    15. Postmenopause  -     DEXA Bone Density Axial; Future             Assessment & Plan  1. Foot numbness.  It is likely associated with diabetic foot changes. She is in the prediabetic range now. In the past, she had polyneuropathy associated with  diabetes or glycemic control. I will refer her to neurology and obtain an EMG.    2. Right upper thigh neuropathy.  I will order a nerve conduction study.    3. Genital numbness.  This has progressed into the groin area. She is already performing pelvic floor PT.  We will refer her to neurology.    4. Health maintenance.  Her BMI is 22. Her blood pressure looks perfect. She had a CT lung cancer screen based on her previous smoking history. She had calcification on an x-ray, but it was benign on the CT scan. I will order a metabolic panel, A1c, and lipid panel. I will order a mammogram and DEXA scan. I will do CPR and ventilate, but not on the long term.    5. Vulvar dysplasia.  She is following gynecology. She will continue estrogen therapy.    6. Postmenopausal hot flashes.    Follow-up  The patient will follow up in 6 months.    Healthcare Maintenance:  Counseling provided based on age appropriate USPSTF guidelines.  BMI is within normal parameters. No other follow-up for BMI required.    Patricia F Bancroft voices understanding and acceptance of this advice and will call back with any further questions or concerns. AVS with preventive healthcare tips printed for patient.     “Discussed risks/benefits to vaccination, reviewed components of the vaccine, discussed VIS, discussed informed consent, informed consent obtained. Patient/Parent was allowed to accept or refuse vaccine. Questions answered to satisfactory state of patient/Parent. We reviewed typical age appropriate and seasonally appropriate vaccinations. Reviewed immunization history and updated state vaccination form as needed. Patient was counseled on COVID-19  Influenza    Follow Up:   Return in about 6 months (around 9/6/2024) for Recheck.      Franco Whittaker MD  [facility] [place] LARISSA Judge    Patient was given instructions and counseling regarding her condition or for health maintenance advice. Please see specific information pulled into the AVS if  appropriate.

## 2024-03-08 ENCOUNTER — TELEPHONE (OUTPATIENT)
Dept: INTERNAL MEDICINE | Facility: CLINIC | Age: 74
End: 2024-03-08
Payer: MEDICARE

## 2024-03-08 NOTE — TELEPHONE ENCOUNTER
Patient notified and verbalized understanding.    Patient is not interested in starting another medication right now. She would like to know what medications you were thinking about her starting?  She will research them some and may reconsider starting another medication

## 2024-03-08 NOTE — TELEPHONE ENCOUNTER
----- Message from Franco Whittaker MD sent at 3/7/2024  9:18 AM EST -----  Please let the patient know that her LDL cholesterol is still above goal while she is taking rosuvastatin at the maximum dose.  I would be interested in starting another medication either as a long-acting injectable medication given once monthly with an autoinjector or a by mouth pill that she can take daily.  Please let me know if she would be interested in these and we can start them now and potentially recheck during her next appointment on 9/10/2024.  The remainder of her labs are within normal limits.  Thanks for coordinating.   Normal rate, regular rhythm.  Heart sounds S1, S2.  No murmurs, rubs or gallops.

## 2024-03-11 NOTE — TELEPHONE ENCOUNTER
Patient has been notified by voicemail of the two medications and advised to call back with any other questions.

## 2024-03-26 ENCOUNTER — OFFICE VISIT (OUTPATIENT)
Dept: INTERNAL MEDICINE | Facility: CLINIC | Age: 74
End: 2024-03-26
Payer: MEDICARE

## 2024-03-26 VITALS
DIASTOLIC BLOOD PRESSURE: 70 MMHG | TEMPERATURE: 97.3 F | OXYGEN SATURATION: 98 % | SYSTOLIC BLOOD PRESSURE: 120 MMHG | BODY MASS INDEX: 21.65 KG/M2 | RESPIRATION RATE: 20 BRPM | HEART RATE: 95 BPM | WEIGHT: 138.25 LBS

## 2024-03-26 DIAGNOSIS — R30.0 DYSURIA: Primary | ICD-10-CM

## 2024-03-26 DIAGNOSIS — N30.90 CYSTITIS: ICD-10-CM

## 2024-03-26 DIAGNOSIS — N95.1 POST MENOPAUSAL SYNDROME: Primary | ICD-10-CM

## 2024-03-26 DIAGNOSIS — H91.93 BILATERAL HEARING LOSS, UNSPECIFIED HEARING LOSS TYPE: ICD-10-CM

## 2024-03-26 LAB
BILIRUB BLD-MCNC: NEGATIVE MG/DL
CLARITY, POC: ABNORMAL
COLOR UR: YELLOW
EXPIRATION DATE: ABNORMAL
GLUCOSE UR STRIP-MCNC: ABNORMAL MG/DL
KETONES UR QL: NEGATIVE
LEUKOCYTE EST, POC: ABNORMAL
Lab: ABNORMAL
NITRITE UR-MCNC: NEGATIVE MG/ML
PH UR: 5 [PH] (ref 5–8)
PROT UR STRIP-MCNC: NEGATIVE MG/DL
RBC # UR STRIP: ABNORMAL /UL
SP GR UR: 1.02 (ref 1–1.03)
UROBILINOGEN UR QL: NORMAL

## 2024-03-26 PROCEDURE — 3044F HG A1C LEVEL LT 7.0%: CPT | Performed by: STUDENT IN AN ORGANIZED HEALTH CARE EDUCATION/TRAINING PROGRAM

## 2024-03-26 PROCEDURE — 81002 URINALYSIS NONAUTO W/O SCOPE: CPT | Performed by: STUDENT IN AN ORGANIZED HEALTH CARE EDUCATION/TRAINING PROGRAM

## 2024-03-26 PROCEDURE — 1160F RVW MEDS BY RX/DR IN RCRD: CPT | Performed by: STUDENT IN AN ORGANIZED HEALTH CARE EDUCATION/TRAINING PROGRAM

## 2024-03-26 PROCEDURE — 87086 URINE CULTURE/COLONY COUNT: CPT | Performed by: STUDENT IN AN ORGANIZED HEALTH CARE EDUCATION/TRAINING PROGRAM

## 2024-03-26 PROCEDURE — 3078F DIAST BP <80 MM HG: CPT | Performed by: STUDENT IN AN ORGANIZED HEALTH CARE EDUCATION/TRAINING PROGRAM

## 2024-03-26 PROCEDURE — 87186 SC STD MICRODIL/AGAR DIL: CPT | Performed by: STUDENT IN AN ORGANIZED HEALTH CARE EDUCATION/TRAINING PROGRAM

## 2024-03-26 PROCEDURE — 1159F MED LIST DOCD IN RCRD: CPT | Performed by: STUDENT IN AN ORGANIZED HEALTH CARE EDUCATION/TRAINING PROGRAM

## 2024-03-26 PROCEDURE — 3074F SYST BP LT 130 MM HG: CPT | Performed by: STUDENT IN AN ORGANIZED HEALTH CARE EDUCATION/TRAINING PROGRAM

## 2024-03-26 PROCEDURE — 87077 CULTURE AEROBIC IDENTIFY: CPT | Performed by: STUDENT IN AN ORGANIZED HEALTH CARE EDUCATION/TRAINING PROGRAM

## 2024-03-26 PROCEDURE — 99213 OFFICE O/P EST LOW 20 MIN: CPT | Performed by: STUDENT IN AN ORGANIZED HEALTH CARE EDUCATION/TRAINING PROGRAM

## 2024-03-26 RX ORDER — AZITHROMYCIN 250 MG/1
TABLET, FILM COATED ORAL
COMMUNITY
Start: 2024-03-22

## 2024-03-26 RX ORDER — SULFAMETHOXAZOLE AND TRIMETHOPRIM 800; 160 MG/1; MG/1
1 TABLET ORAL 2 TIMES DAILY
Qty: 10 TABLET | Refills: 0 | Status: SHIPPED | OUTPATIENT
Start: 2024-03-26 | End: 2024-03-31

## 2024-03-26 NOTE — PROGRESS NOTES
Follow Up Office Visit      Date: 2024   Patient Name: Patricia F Bancroft  : 1950   MRN: [id number]     Chief Complaint:    Chief Complaint   Patient presents with    Difficulty Urinating       History of Present Illness: Patricia F Bancroft is a 73 y.o. female who is here today for dysuria.    HPI  Only prior urine culuture normal on 3/5/21    Sinus infection.   She was diagnosed with a sinus infection on 2024 and was prescribed a Z-bryon. She takes her last pill tonight. The Z-Bryon has caused heartburn and loose stools.     Urinary tract symptoms.  She began experiencing dysuria approximately 2 days ago and began taking a probiotic. Her dysuria is not constant. She reports increased urinary urgency. The patient denies any fevers, chills, nausea, or vomiting.     Back pain.  When overexerting herself, her back pain increases, and she takes Tylenol for pain.     Hearing loss.   Her hearing has decreased over the years.     Exercise.  She is relatively active and participates in a Get Up and Go class at the [facility] and does 30 minutes of cardio and 30 minutes of weight resistance. She also participates in a seated dance class.     Drug allergies.   She is allergic to CIPRO and IBUPROFEN.    Social history.  Of note, she will be leaving after this visit to travel.   Subjective      Review of Systems:   Review of Systems   Constitutional:  Negative for chills and fever.   Gastrointestinal:  Positive for diarrhea and GERD. Negative for nausea and vomiting.   Genitourinary:  Positive for dysuria and urgency.   Musculoskeletal:  Positive for back pain.     I have reviewed the patients family history, social history, past medical history, past surgical history and have updated it as appropriate.     Medications:     Current Outpatient Medications:     aspirin 162 MG EC tablet, Take 81 mg by mouth Daily., Disp: , Rfl:     azithromycin (ZITHROMAX) 250 MG tablet, , Disp: , Rfl:     cetirizine  (zyrTEC) 10 MG tablet, Take 1 tablet by mouth Daily., Disp: , Rfl:     estradiol (CLIMARA) 0.025 MG/24HR patch, Place 1 patch on the skin as directed by provider 1 (One) Time Per Week for 12 doses., Disp: 12 patch, Rfl: 0    famotidine (PEPCID) 20 MG tablet, Take 1 tablet by mouth 2 (Two) Times a Day., Disp: 180 tablet, Rfl: 3    fluticasone (FLONASE) 50 MCG/ACT nasal spray, 2 sprays into the nostril(s) as directed by provider Daily., Disp: , Rfl:     lisinopril (PRINIVIL,ZESTRIL) 10 MG tablet, Take 1 tablet by mouth Daily., Disp: 90 tablet, Rfl: 3    metoprolol succinate XL (TOPROL-XL) 25 MG 24 hr tablet, Take 1 tablet by mouth Daily., Disp: 90 tablet, Rfl: 1    Polyethylene Glycol 3350 (MIRALAX PO), Take  by mouth., Disp: , Rfl:     rosuvastatin (CRESTOR) 40 MG tablet, Take 1 tablet by mouth Daily., Disp: 90 tablet, Rfl: 3    sulfamethoxazole-trimethoprim (Bactrim DS) 800-160 MG per tablet, Take 1 tablet by mouth 2 (Two) Times a Day for 5 days., Disp: 10 tablet, Rfl: 0    Allergies:   Allergies   Allergen Reactions    Other Other (See Comments)    Procaine Other (See Comments)    Ciprofloxacin Myalgia and Other (See Comments)    Dust Mite Extract Cough, Itching and Other (See Comments)     Household Dust, and Animal Dander    Ibuprofen Hives       Objective     Physical Exam: Please see above  Vital Signs:   Vitals:    03/26/24 1508   BP: 120/70   BP Location: Left arm   Patient Position: Sitting   Cuff Size: Adult   Pulse: 95   Resp: 20   Temp: 97.3 °F (36.3 °C)   TempSrc: Temporal   SpO2: 98%   Weight: 62.7 kg (138 lb 4 oz)   PainSc:   2   PainLoc: Groin     Body mass index is 21.65 kg/m².    Physical Exam  Vitals reviewed.   Constitutional:       General: She is not in acute distress.     Appearance: Normal appearance. She is normal weight. She is not ill-appearing or toxic-appearing.   HENT:      Head: Normocephalic and atraumatic.      Right Ear: External ear normal.      Left Ear: External ear normal.       Nose: Nose normal.      Mouth/Throat:      Mouth: Mucous membranes are moist.   Eyes:      General: No scleral icterus.     Extraocular Movements: Extraocular movements intact.      Pupils: Pupils are equal, round, and reactive to light.   Cardiovascular:      Rate and Rhythm: Normal rate and regular rhythm.      Pulses: Normal pulses.      Heart sounds: Normal heart sounds.   Pulmonary:      Effort: Pulmonary effort is normal. No respiratory distress.      Breath sounds: Normal breath sounds. No stridor. No wheezing, rhonchi or rales.   Abdominal:      General: Abdomen is flat. Bowel sounds are normal. There is no distension.      Palpations: Abdomen is soft. There is no mass.      Tenderness: There is no abdominal tenderness. There is no right CVA tenderness, left CVA tenderness, guarding or rebound.   Musculoskeletal:      Cervical back: Normal range of motion. No rigidity.   Skin:     General: Skin is warm and dry.      Capillary Refill: Capillary refill takes less than 2 seconds.   Neurological:      General: No focal deficit present.      Mental Status: She is alert and oriented to person, place, and time.   Psychiatric:         Mood and Affect: Mood normal.         Behavior: Behavior normal.         Judgment: Judgment normal.         Procedures    Results:   Labs:   Hemoglobin A1C   Date Value Ref Range Status   03/06/2024 5.6 4.5 - 5.7 % Final   12/06/2021 6.10 (H) 4.80 - 5.60 % Final     TSH   Date Value Ref Range Status   12/01/2020 1.160 0.270 - 4.200 uIU/mL Final        Imaging:   No valid procedures specified.     Assessment / Plan      Assessment/Plan:   Problem List Items Addressed This Visit    None  Visit Diagnoses       Dysuria    -  Primary    Relevant Orders    POC Urinalysis Dipstick (Completed)    Urine Culture - Urine, Urine, Clean Catch    Cystitis        Relevant Medications    sulfamethoxazole-trimethoprim (Bactrim DS) 800-160 MG per tablet    Bilateral hearing loss, unspecified hearing  loss type        Relevant Orders    Ambulatory Referral to Audiology (Completed)        1. Urinary tract infection.  Her urinalysis today is positive for a urinary tract infection. I will also send her urine for culture. I will start her on Bactrim 1 tablet 2 times a day for 5 days. She was advised to stay well hydrated.    2. Hearing loss.  A referral was placed to Dr. Whittaker for a hearing test.    Follow Up:   Return if symptoms worsen or fail to improve.      Westley Farrar MD  [facility]    Transcribed from ambient dictation for Westley Farrar MD by Adamaris Jackson.  03/26/24   19:04 EDT    Patient or patient representative verbalized consent to the visit recording.  I have personally performed the services described in this document as transcribed by the above individual, and it is both accurate and complete.

## 2024-03-28 LAB — BACTERIA SPEC AEROBE CULT: ABNORMAL

## 2024-03-29 ENCOUNTER — TELEPHONE (OUTPATIENT)
Dept: INTERNAL MEDICINE | Facility: CLINIC | Age: 74
End: 2024-03-29
Payer: MEDICARE

## 2024-03-29 NOTE — TELEPHONE ENCOUNTER
Tried to reach patient no answer left voicemail to return call    RELAY:  I'd recommend doing it before neurology appointment and then they can use the results to make recommendations.  Thanks.

## 2024-03-29 NOTE — TELEPHONE ENCOUNTER
"Name: Bancroft, Patricia F \"Pat\"    Relationship: Self    Best Callback Number: [phone]     HUB PROVIDED THE RELAY MESSAGE FROM THE OFFICE   PATIENT VOICED UNDERSTANDING AND HAS NO FURTHER QUESTIONS AT THIS TIME      "

## 2024-03-29 NOTE — TELEPHONE ENCOUNTER
----- Message from Franco Whittaker MD sent at 3/28/2024 12:46 PM EDT -----  I'd recommend doing it before neurology appointment and then they can use the results to make recommendations.  Thanks.  ----- Message -----  From: Keely Flores CMA  Sent: 3/28/2024  10:27 AM EDT  To: Franco Whittaker MD    Patient has been notified of results and recommendations. Patient verb good understanding. Patient stated that she received a mychart message about her nerve study test and scheduling it. Patient wanted to know should she wait on the neurology appointment first or go ahead and do the nerve test before the neurology appointment

## 2024-04-17 ENCOUNTER — TELEPHONE (OUTPATIENT)
Dept: INTERNAL MEDICINE | Facility: CLINIC | Age: 74
End: 2024-04-17
Payer: MEDICARE

## 2024-04-17 NOTE — TELEPHONE ENCOUNTER
Caller: HERMAN    [facility]    Best call back number: [phone]    Who are you requesting to speak with (clinical staff, provider,  specific staff member):  DR FARFAN.    What was the call regarding: THE PATIENT IS SCHEDULED 5/13/24 AT 10 AM WITH [facility] WITH A H.I.S SYLVIA YOUNG.  HERMAN STATED THEY WILL SEND THE RESULTS/NOTES AFTER THE APPOINTMENT.  NO NEED TO CALL BACK. JUST A FYI FOR DR FARFAN.

## 2024-05-06 DIAGNOSIS — N95.1 POST MENOPAUSAL SYNDROME: ICD-10-CM

## 2024-05-14 ENCOUNTER — TELEPHONE (OUTPATIENT)
Dept: INTERNAL MEDICINE | Facility: CLINIC | Age: 74
End: 2024-05-14
Payer: MEDICARE

## 2024-05-14 DIAGNOSIS — N95.1 POST MENOPAUSAL SYNDROME: ICD-10-CM

## 2024-05-14 NOTE — TELEPHONE ENCOUNTER
"Caller: BancroftDahiana \"Pat\"    Relationship: Self    Best call back number: [phone]    Requested Prescriptions:   Requested Prescriptions     Pending Prescriptions Disp Refills    estradiol (CLIMARA) 0.025 MG/24HR patch 12 patch 0     Sig: Place 1 patch on the skin as directed by provider 1 (One) Time Per Week for 12 doses.        Pharmacy where request should be sent: [facility] [address] [phone] [facility] [phone]      Last office visit with prescribing clinician: 3/6/2024   Last telemedicine visit with prescribing clinician: Visit date not found   Next office visit with prescribing clinician: 9/10/2024     Additional details provided by patient: PATIENT IS WANTING TO MAKE SURE THAT SHE CAN HAVE THE PRESCRIPTION WHEN THE CURRENT 3 MONTH SUPPLY RUNS OUT     Does the patient have less than a 3 day supply:  [] Yes  [x] No    Would you like a call back once the refill request has been completed: [x] Yes [] No    If the office needs to give you a call back, can they leave a voicemail: [x] Yes [] No    Melody Marquez Rep   05/14/24 14:39 EDT         "

## 2024-05-14 NOTE — TELEPHONE ENCOUNTER
"  Caller: Bancroft, Patricia F \"Pat\"    Relationship: Self    Best call back number: [phone]    What is the best time to reach you: ANYTIME     Who are you requesting to speak with (clinical staff, provider,  specific staff member): CLINICAL STAFF    What was the call regarding: PATIENT IS CALLING TO SPEAK WITH THE CLINICAL STAFF. SHE SAYS THAT HER APPOINTMENT WITH NEUROLOGY IS NOT UNTIL JUNE AND THE FIRST AVAILABILITY TO GET THE EMG IS NOT UNTIL AUGUST. SHE IS LOOKING TO GET SOME GUIDANCE FROM HER PROVIDER ABOUT WHAT SHE SHOULD DO.     Is it okay if the provider responds through [employer]: YES    "

## 2024-05-14 NOTE — TELEPHONE ENCOUNTER
Please let the patient know if there are any sooner available date for either of these appointments.  Thanks.

## 2024-05-16 NOTE — TELEPHONE ENCOUNTER
Called and spoke with patient - Advised her to keep Neurology appointment due to similar wait times across the board for Neuro. EMG's with Dr Lopez are booking out about a month. I advised her to call them to schedule. Patient verbalized good understanding

## 2024-05-20 DIAGNOSIS — N95.1 POST MENOPAUSAL SYNDROME: ICD-10-CM

## 2024-05-22 DIAGNOSIS — E78.2 MIXED HYPERLIPIDEMIA: ICD-10-CM

## 2024-05-22 DIAGNOSIS — I10 ESSENTIAL HYPERTENSION: ICD-10-CM

## 2024-05-22 DIAGNOSIS — N95.1 POST MENOPAUSAL SYNDROME: ICD-10-CM

## 2024-05-22 RX ORDER — ROSUVASTATIN CALCIUM 40 MG/1
40 TABLET, FILM COATED ORAL DAILY
Qty: 90 TABLET | Refills: 3 | Status: SHIPPED | OUTPATIENT
Start: 2024-05-22

## 2024-05-22 RX ORDER — LISINOPRIL 10 MG/1
10 TABLET ORAL DAILY
Qty: 90 TABLET | Refills: 3 | Status: SHIPPED | OUTPATIENT
Start: 2024-05-22

## 2024-05-22 RX ORDER — METOPROLOL SUCCINATE 25 MG
25 TABLET, EXTENDED RELEASE 24 HR ORAL DAILY
Qty: 90 TABLET | Refills: 1 | Status: SHIPPED | OUTPATIENT
Start: 2024-05-22

## 2024-05-22 NOTE — TELEPHONE ENCOUNTER
"    Caller: Bancroft Dahiana EMERSON \"Pat\"    Relationship: Self    Best call back number: [phone]     Requested Prescriptions:   Requested Prescriptions     Pending Prescriptions Disp Refills    estradiol (CLIMARA) 0.025 MG/24HR patch 12 patch 0     Sig: Place 1 patch on the skin as directed by provider 1 (One) Time Per Week for 12 doses.        [facility]arm[facility] where request [facility] be sent[facility] [address] [phone] [facility] [phone]      Last office visit with prescribing clinician: 3/6/2024   Last telemedicine visit with prescribing clinician: Visit date not found   Next office visit with prescribing clinician: 9/10/2024     Additional details provided by patient: PATIENT IS ALMOST OUT AND IT WAS SENT TO WRONG PHARMACY. PATIENT STATES SHE WOULD LIKE A 90 DAY SUPPLY WITH ONE REFILL    Does the patient have less than a 3 day supply:  [x] Yes  [] No    Would you like a call back once the refill request has been completed: [] Yes [x] No    If the office needs to give you a call back, can they leave a voicemail: [] Yes [x] No    Melody Clement Rep   05/22/24 09:56 EDT           "

## 2024-06-11 ENCOUNTER — OFFICE VISIT (OUTPATIENT)
Dept: NEUROLOGY | Facility: CLINIC | Age: 74
End: 2024-06-11
Payer: MEDICARE

## 2024-06-11 VITALS
HEART RATE: 76 BPM | HEIGHT: 67 IN | SYSTOLIC BLOOD PRESSURE: 134 MMHG | DIASTOLIC BLOOD PRESSURE: 68 MMHG | BODY MASS INDEX: 22.26 KG/M2 | OXYGEN SATURATION: 95 % | WEIGHT: 141.8 LBS

## 2024-06-11 DIAGNOSIS — R26.89 BALANCE PROBLEM: ICD-10-CM

## 2024-06-11 DIAGNOSIS — G62.9 POLYNEUROPATHY: Primary | ICD-10-CM

## 2024-06-11 PROCEDURE — 1159F MED LIST DOCD IN RCRD: CPT | Performed by: NURSE PRACTITIONER

## 2024-06-11 PROCEDURE — 1160F RVW MEDS BY RX/DR IN RCRD: CPT | Performed by: NURSE PRACTITIONER

## 2024-06-11 PROCEDURE — 99214 OFFICE O/P EST MOD 30 MIN: CPT | Performed by: NURSE PRACTITIONER

## 2024-06-11 PROCEDURE — 3078F DIAST BP <80 MM HG: CPT | Performed by: NURSE PRACTITIONER

## 2024-06-11 PROCEDURE — 3075F SYST BP GE 130 - 139MM HG: CPT | Performed by: NURSE PRACTITIONER

## 2024-06-11 NOTE — PROGRESS NOTES
Neuro Office Visit      Encounter Date: 2024   Patient Name: Patricia F Bancroft  : 1950   MRN: [id number]   PCP:  Franco Whittaker MD     Chief Complaint:    Chief Complaint   Patient presents with   • Peripheral Neuropathy       History of Present Illness: Patricia F Bancroft is a 73 y.o. female who is here today in Neurology for neuropathy.  History of Present Illness    The patient reports experiencing superficial neuropathy in the top of her thighs, down the anterior portion of her knees, and feet feet, which commenced approximately a year ago.     Right leg is more severe than the left, is constant and does not intensify.     Denies any falls or difficulty walking, although she does report occasional balance issues.     She describes the sensation as normal to touch, but experiences a layer of numbness. She also experiences some neuropathy in the genital area.    Chronic lower back pain is no more than normal. Her feet bother her more at night, depending on her activity level during the day. She had a sciatic attack in the early  and had an MRI, which showed a small bulge, but not enough to warrant surgery. She attended physical therapy, which resolved sciatica. She is concerned about a pinched nerve and inquires about physical therapy.     Uses inserts in her shoes and follows with a podiatrist. She has a high arch and describes the sensation as having pockets on the balls of her feet.     She has balance issues due to her feet and the inserts in her shoes. Feet are numb, and her feet occasionally clench and lock into place, which causes pain underneath. She does not like to take medications. She denies any loss of bowel or bladder function.        Subjective      Past Medical History:   Past Medical History:   Diagnosis Date   • Allergic    • Allergic rhinitis     Seasonal allergies   • Coronary artery disease     S/P JAN to RCA, May 2014 EF 65% no residual Left system disease   •  Dyslipidemia    • Dysosmia    • Heart murmur    • History of tobacco use 2020   • HL (hearing loss) 1970    Tinnitus   • Hx of migraines    • Hyperlipidemia    • Hypertension    • Medicare annual wellness visit, subsequent 2023   • Mitral valve prolapse    • Osteopenia     Osteopenia improving   • Pneumonia 1971   • Prediabetes 10/07/2016   • Urinary tract infection ?       Past Surgical History:   Past Surgical History:   Procedure Laterality Date   • BREAST BIOPSY Left    • BREAST EXCISIONAL BIOPSY Left    • BREAST SURGERY      breast biopsy X2   • CARDIAC CATHETERIZATION  2014    Also    • COLONOSCOPY  ~   • COLONOSCOPY      cologaurd    • COLONOSCOPY      2009   • CORONARY STENT PLACEMENT  2014   • HYSTERECTOMY  2004   • OOPHORECTOMY     • VULVECTOMY  2021       Family History:   Family History   Problem Relation Age of Onset   • Cancer Mother         Lung cancer;  Dec 1996   • Lung cancer Mother    • Cancer Father         Primary site unknown;  1996   • Prostate cancer Father    • Heart disease Father    • Cancer Sister         Pancreatic cancer; 2020   • Pancreatic cancer Sister    • Heart disease Maternal Grandfather    • Stroke Maternal Aunt    • Diabetes Cousin    • Migraines Cousin    • Migraines Cousin    • Breast cancer Neg Hx    • Ovarian cancer Neg Hx    • Uterine cancer Neg Hx    • Colon cancer Neg Hx    • Melanoma Neg Hx        Social History:   Social History     Socioeconomic History   • Marital status:    Tobacco Use   • Smoking status: Former     Current packs/day: 0.00     Average packs/day: 1.5 packs/day for 43.6 years (65.4 ttl pk-yrs)     Types: Cigarettes     Start date: 1966     Quit date: 2009     Years since quittin.8     Passive exposure: Past   • Smokeless tobacco: Never   Vaping Use   • Vaping status: Never Used   Substance and Sexual Activity   • Alcohol use: No     Comment: Quit  1994   • Drug use: No     Types: Marijuana     Comment: as a teenager   • Sexual activity: Yes     Partners: Male     Birth control/protection: Post-menopausal       Medications:     Current Outpatient Medications:   •  aspirin 162 MG EC tablet, Take 81 mg by mouth Daily., Disp: , Rfl:   •  cetirizine (zyrTEC) 10 MG tablet, Take 1 tablet by mouth Daily., Disp: , Rfl:   •  Crestor 40 MG tablet, TAKE ONE TABLET BY MOUTH EVERY DAY, Disp: 90 tablet, Rfl: 3  •  estradiol (CLIMARA) 0.025 MG/24HR patch, Place 1 patch on the skin as directed by provider 1 (One) Time Per Week for 12 doses., Disp: 12 patch, Rfl: 0  •  famotidine (PEPCID) 20 MG tablet, Take 1 tablet by mouth 2 (Two) Times a Day., Disp: 180 tablet, Rfl: 3  •  fluticasone (FLONASE) 50 MCG/ACT nasal spray, 2 sprays into the nostril(s) as directed by provider Daily., Disp: , Rfl:   •  lisinopril (PRINIVIL,ZESTRIL) 10 MG tablet, TAKE ONE TABLET BY MOUTH EVERY DAY, Disp: 90 tablet, Rfl: 3  •  Polyethylene Glycol 3350 (MIRALAX PO), Take  by mouth., Disp: , Rfl:   •  Toprol XL 25 MG 24 hr tablet, TAKE ONE TABLET BY MOUTH EVERY DAY, Disp: 90 tablet, Rfl: 1  •  azithromycin (ZITHROMAX) 250 MG tablet, , Disp: , Rfl:     Allergies:   Allergies   Allergen Reactions   • Other Other (See Comments)   • Procaine Other (See Comments)   • Ciprofloxacin Myalgia and Other (See Comments)   • Dust Mite Extract Cough, Itching and Other (See Comments)     Household Dust, and Animal Dander   • Ibuprofen Hives       PHQ-9 Total Score:     STEADI Fall Risk Assessment was completed, and patient is at LOW risk for falls.Assessment completed on:6/11/2024    Objective     Physical Exam:     Neurologic Exam     Mental Status   Oriented to person, place, and time.   Attention: normal.   Speech: speech is normal   Level of consciousness: alert  Knowledge: good.     Cranial Nerves     CN II   Visual fields full to confrontation.     CN III, IV, VI   Pupils are equal, round, and reactive to  light.  Extraocular motions are normal.   CN III: no CN III palsy  CN VI: no CN VI palsy    CN V   Facial sensation intact.     CN VII   Facial expression full, symmetric.     CN VIII   CN VIII normal.     CN IX, X   CN IX normal.   CN X normal.     CN XI   CN XI normal.     CN XII   CN XII normal.     Motor Exam   Muscle bulk: normal  Overall muscle tone: normal    Strength   Right neck flexion: 5/5  Left neck flexion: 5/5  Right neck extension: 5/5  Left neck extension: 5/5  Right deltoid: 5/5  Left deltoid: 5/5  Right biceps: 5/5  Left biceps: 5/5  Right triceps: 5/5  Left triceps: 5/5  Right wrist flexion: 5/5  Left wrist flexion: 5/5  Right wrist extension: 5/5  Left wrist extension: 5/5  Right interossei: 5/5  Left interossei: 5/5  Right abdominals: 5/5  Left abdominals: 5/5  Right iliopsoas: 5/5  Left iliopsoas: 5/5  Right quadriceps: 5/5  Left quadriceps: 5/5  Right hamstrin/5  Left hamstrin/5  Right glutei: 5/5  Left glutei: 5/5  Right anterior tibial: 5/5  Left anterior tibial: 5/5  Right posterior tibial: 5/5  Left posterior tibial: 5/5  Right peroneal: 5/5  Left peroneal: 5/5  Right gastroc: 5/5  Left gastroc: 5/5    Sensory Exam   Light touch normal.   Proprioception normal.   Right leg pinprick: decreased from toes    Gait, Coordination, and Reflexes     Gait  Gait: normal    Coordination   Romberg: negative  Finger to nose coordination: normal  Heel to shin coordination: normal  Tandem walking coordination: normal    Tremor   Resting tremor: absent  Intention tremor: absent  Action tremor: absent    Reflexes   Right brachioradialis: 2+  Left brachioradialis: 2+  Right biceps: 2+  Left biceps: 2+  Right triceps: 2+  Left triceps: 2+  Right patellar: 2+  Left patellar: 2+  Right achilles: 2+  Left achilles: 2+  Right : 2+  Left : 2+Feet abducted when walking        Vital Signs:   Vitals:    24 0824   BP: 134/68   Pulse: 76   SpO2: 95%   Weight: 64.3 kg (141 lb 12.8 oz)   Height:  "170.2 cm (67.01\")     Body mass index is 22.2 kg/m².     Results:   Results       Imaging:   No Images in the past 120 days found..     Labs:   No results found for: \"CMP\", \"PROTEIN\", \"ANTIMOGAB\", \"QMHJYK6CGNI\", \"JCVRESULT\", \"QUANTTBGOLD\", \"CBCDIF\", \"IGGALBSER\"     Assessment / Plan      Assessment/Plan:   Diagnoses and all orders for this visit:    1. Polyneuropathy (Primary)  Comments:  EMG  Orders:  -     Vitamin B12 & Folate; Future  -     UVALDO + PE; Future  -     C-reactive Protein; Future  -     Ambulatory Referral to Physical Therapy    2. Balance problem  Comments:  Physical therapy referral           Patient Education:     Reviewed medications, potential side effects and signs and symptoms to report. Discussed risk versus benefits of treatment plan with patient and/or family-including medications, labs and radiology that may be ordered. Addressed questions and concerns during visit. Patient and/or family verbalized understanding and agree with plan. Instructed to call the office with any questions and report to ER with any life-threatening symptoms.     Follow Up:   Return in about 3 months (around 9/11/2024).    I spent 30 minutes caring for Dahiana on this date of service. This time includes time spent by me in the following activities: preparing for the visit, obtaining and/or reviewing a separately obtained history, performing a medically appropriate examination and/or evaluation, ordering medications, tests, or procedures, and documenting information in the medical record.        During this visit the following were done:  Labs Reviewed [x]    Labs Ordered [x]    Radiology Reports Reviewed []    Radiology Ordered []    PCP Records Reviewed []    Referring Provider Records Reviewed [x]    ER Records Reviewed []    Hospital Records Reviewed []    History Obtained From Family []    Radiology Images Reviewed []    Other Reviewed [x]    Records Requested []      Patient or patient representative verbalized " consent for the use of Ambient Listening during the visit with  ROSA ELENA Ramirez for chart documentation. 6/11/2024  12:57 EDT    ROSAE LENA Ramirez   [facility] NEUROLOGY  [address] [place] [address]  [facility] [id number] [phone]

## 2024-06-13 ENCOUNTER — PATIENT MESSAGE (OUTPATIENT)
Dept: NEUROLOGY | Facility: CLINIC | Age: 74
End: 2024-06-13
Payer: MEDICARE

## 2024-06-13 NOTE — TELEPHONE ENCOUNTER
From: Patricia F Bancroft  To: Cori Caro  Sent: 6/13/2024 3:18 PM EDT  Subject: Follow up testing, procedures, physical therapy     Kirit YORK, I see you’ve ordered tests, etc. As we discussed, I need confirmation that all of this is covered by Medicare before I do these things. Can you or someone else there let me know? Thank you!!  Danielle

## 2024-06-18 ENCOUNTER — TELEPHONE (OUTPATIENT)
Dept: INTERNAL MEDICINE | Facility: CLINIC | Age: 74
End: 2024-06-18
Payer: MEDICARE

## 2024-06-18 ENCOUNTER — LAB (OUTPATIENT)
Dept: LAB | Facility: HOSPITAL | Age: 74
End: 2024-06-18
Payer: MEDICARE

## 2024-06-18 DIAGNOSIS — G62.9 POLYNEUROPATHY: ICD-10-CM

## 2024-06-18 LAB
CRP SERPL-MCNC: 0.48 MG/DL (ref 0–0.5)
FOLATE SERPL-MCNC: >20 NG/ML (ref 4.78–24.2)
VIT B12 BLD-MCNC: 786 PG/ML (ref 211–946)

## 2024-06-18 PROCEDURE — 36415 COLL VENOUS BLD VENIPUNCTURE: CPT

## 2024-06-18 PROCEDURE — 84155 ASSAY OF PROTEIN SERUM: CPT

## 2024-06-18 PROCEDURE — 82746 ASSAY OF FOLIC ACID SERUM: CPT

## 2024-06-18 PROCEDURE — 82607 VITAMIN B-12: CPT

## 2024-06-18 PROCEDURE — 86140 C-REACTIVE PROTEIN: CPT

## 2024-06-18 PROCEDURE — 82784 ASSAY IGA/IGD/IGG/IGM EACH: CPT

## 2024-06-18 PROCEDURE — 86334 IMMUNOFIX E-PHORESIS SERUM: CPT

## 2024-06-18 PROCEDURE — 84165 PROTEIN E-PHORESIS SERUM: CPT

## 2024-06-18 NOTE — TELEPHONE ENCOUNTER
Best call back number: [phone]      What medication are you requesting: MED FOR UTI, SAME MEDS AS LAST TIME     What are your current symptoms: BURNING [id number]     How long have you been experiencing symptoms: 3 DAYS     Have you had these symptoms before:                                  [] Yes  [x] No     Have you been treated for these symptoms before:              [] Yes  [x] No    If a prescription is needed, what is your preferred pharmacy and phone number: [facility] [id number] - [place] [address] AT [facility] [address] [facility] - [phone]  - [phone] FX     Additional notes: PLEASE CALL IF SOMETHING IS SENT IN.

## 2024-06-18 NOTE — TELEPHONE ENCOUNTER
"Caller: Bancroft, Patricia F \"Pat\"    Relationship: Self    Best call back number: [phone]     What medication are you requesting: MED FOR UTI, SAME MEDS AS LAST TIME    What are your current symptoms: BURNING [id number]    How long have you been experiencing symptoms: 3 DAYS    Have you had these symptoms before:    [] Yes  [x] No    Have you been treated for these symptoms before:   [] Yes  [x] No    If a prescription is needed, what is your preferred pharmacy and phone number: [facility] [id number] - [place] [id number]  [facility] [address] - [phone]  - [phone] FX     PLEASE CALL PATIENT IF SOMETHING IS SENT IN.  "

## 2024-06-19 NOTE — TELEPHONE ENCOUNTER
THE PATIENT IS CALLING BACK IN SHE WOULD LIKE TO HAVE THE MEDICATION SENT TO [facility] IN [facility]

## 2024-06-20 LAB
ALBUMIN SERPL ELPH-MCNC: 3.7 G/DL (ref 2.9–4.4)
ALBUMIN/GLOB SERPL: 1.3 {RATIO} (ref 0.7–1.7)
ALPHA1 GLOB SERPL ELPH-MCNC: 0.3 G/DL (ref 0–0.4)
ALPHA2 GLOB SERPL ELPH-MCNC: 0.8 G/DL (ref 0.4–1)
B-GLOBULIN SERPL ELPH-MCNC: 0.8 G/DL (ref 0.7–1.3)
GAMMA GLOB SERPL ELPH-MCNC: 0.9 G/DL (ref 0.4–1.8)
GLOBULIN SER-MCNC: 2.9 G/DL (ref 2.2–3.9)
IGA SERPL-MCNC: 124 MG/DL (ref 64–422)
IGG SERPL-MCNC: 1041 MG/DL (ref 586–1602)
IGM SERPL-MCNC: 81 MG/DL (ref 26–217)
INTERPRETATION SERPL IEP-IMP: NORMAL
LABORATORY COMMENT REPORT: NORMAL
M PROTEIN SERPL ELPH-MCNC: NORMAL G/DL
PROT SERPL-MCNC: 6.6 G/DL (ref 6–8.5)

## 2024-06-20 NOTE — TELEPHONE ENCOUNTER
Left voice mail message for Pt to call back    Relay  Patient should be seen in clinic or [facility] before meds will be prescribed for UTI.     Dr. Farrar

## 2024-06-21 NOTE — TELEPHONE ENCOUNTER
Pt reached and verified if she has been seen for UTI. Pt stated she has been treated and is doing better.

## 2024-06-25 PROCEDURE — 87086 URINE CULTURE/COLONY COUNT: CPT

## 2024-07-10 ENCOUNTER — OFFICE VISIT (OUTPATIENT)
Dept: GYNECOLOGIC ONCOLOGY | Facility: CLINIC | Age: 74
End: 2024-07-10
Payer: MEDICARE

## 2024-07-10 VITALS
RESPIRATION RATE: 18 BRPM | WEIGHT: 141.2 LBS | HEIGHT: 67 IN | BODY MASS INDEX: 22.16 KG/M2 | SYSTOLIC BLOOD PRESSURE: 123 MMHG | HEART RATE: 78 BPM | TEMPERATURE: 98.2 F | OXYGEN SATURATION: 100 % | DIASTOLIC BLOOD PRESSURE: 61 MMHG

## 2024-07-10 DIAGNOSIS — N95.1 POST MENOPAUSAL SYNDROME: ICD-10-CM

## 2024-07-10 DIAGNOSIS — Z87.412 HISTORY OF VULVAR DYSPLASIA: Primary | ICD-10-CM

## 2024-07-10 PROCEDURE — 1126F AMNT PAIN NOTED NONE PRSNT: CPT | Performed by: OBSTETRICS & GYNECOLOGY

## 2024-07-10 PROCEDURE — 99213 OFFICE O/P EST LOW 20 MIN: CPT | Performed by: OBSTETRICS & GYNECOLOGY

## 2024-07-10 PROCEDURE — 3078F DIAST BP <80 MM HG: CPT | Performed by: OBSTETRICS & GYNECOLOGY

## 2024-07-10 PROCEDURE — 1159F MED LIST DOCD IN RCRD: CPT | Performed by: OBSTETRICS & GYNECOLOGY

## 2024-07-10 PROCEDURE — 3074F SYST BP LT 130 MM HG: CPT | Performed by: OBSTETRICS & GYNECOLOGY

## 2024-07-10 PROCEDURE — 1160F RVW MEDS BY RX/DR IN RCRD: CPT | Performed by: OBSTETRICS & GYNECOLOGY

## 2024-07-10 RX ORDER — ASPIRIN 81 MG/1
81 TABLET, CHEWABLE ORAL DAILY
COMMUNITY

## 2024-07-10 NOTE — PROGRESS NOTES
Patricia F Bancroft  [id number]  1950      Reason for visit: History of GABINO 3    History of present illness:  The patient is a 73 y.o. year old female who presents today for treatment and evaluation of the above issues.    Patient is a previous patient of Dr. Hill's.  At last visit 10/26/2023 with Dr. Hill, patient was recommended to have annual evaluations with OB/GYN.  Today, she reports recurrent UTI symptoms with UA being negative.  This is how her dysplasia initially presented and she is concerned about recurrence.  She does not currently have any symptoms and notes that the symptoms wax and wane.  She has a partner and notes some discomfort with intercourse.  She requests refill of estrogen.  She has appointment to establish care with Dr. Hernandez in October.  We discussed vaginal lubrication with intercourse.    Oncologic History:  Oncology/Hematology History    No history exists.         Past Medical History:   Diagnosis Date    Allergic     Allergic rhinitis     Seasonal allergies    Coronary artery disease     S/P JAN to RCA, May 2014 EF 65% no residual Left system disease    Dyslipidemia     Dysosmia     Heart murmur     History of tobacco use 12/01/2020    HL (hearing loss) 1970    Tinnitus    Hx of migraines     Hyperlipidemia     Hypertension     Medicare annual wellness visit, subsequent 01/17/2023    Mitral valve prolapse     Osteopenia 1990    Osteopenia improving    Pneumonia 1971    Prediabetes 10/07/2016    Urinary tract infection 2016?       Past Surgical History:   Procedure Laterality Date    BREAST BIOPSY Left     BREAST EXCISIONAL BIOPSY Left     BREAST SURGERY      breast biopsy X2    CARDIAC CATHETERIZATION  05/02/2014    Also 04/00/2016    COLONOSCOPY  ~2006    COLONOSCOPY      cologaurd 2017    COLONOSCOPY      march 2009    CORONARY STENT PLACEMENT  05/02/2014    HYSTERECTOMY  03/2004    OOPHORECTOMY      VULVECTOMY  04/12/2021       MEDICATIONS:    Current Outpatient  Medications:     aspirin 81 MG chewable tablet, Chew 1 tablet Daily., Disp: , Rfl:     cetirizine (zyrTEC) 10 MG tablet, Take 1 tablet by mouth Daily., Disp: , Rfl:     Crestor 40 MG tablet, TAKE ONE TABLET BY MOUTH EVERY DAY, Disp: 90 tablet, Rfl: 3    [START ON 2024] estradiol (CLIMARA) 0.025 MG/24HR patch, Place 1 patch on the skin as directed by provider 2 (Two) Times a Week for 90 days., Disp: 24 patch, Rfl: 1    famotidine (PEPCID) 20 MG tablet, Take 1 tablet by mouth 2 (Two) Times a Day., Disp: 180 tablet, Rfl: 3    fluticasone (FLONASE) 50 MCG/ACT nasal spray, 2 sprays into the nostril(s) as directed by provider Daily., Disp: , Rfl:     lisinopril (PRINIVIL,ZESTRIL) 10 MG tablet, TAKE ONE TABLET BY MOUTH EVERY DAY, Disp: 90 tablet, Rfl: 3    Polyethylene Glycol 3350 (MIRALAX PO), Take  by mouth., Disp: , Rfl:     Toprol XL 25 MG 24 hr tablet, TAKE ONE TABLET BY MOUTH EVERY DAY, Disp: 90 tablet, Rfl: 1     Allergies:  is allergic to other, procaine, ciprofloxacin, dust mite extract, and ibuprofen.    Social History:   Social History     Socioeconomic History    Marital status:    Tobacco Use    Smoking status: Former     Current packs/day: 0.00     Average packs/day: 1.5 packs/day for 43.6 years (65.4 ttl pk-yrs)     Types: Cigarettes     Start date: 1966     Quit date: 2009     Years since quittin.9     Passive exposure: Past    Smokeless tobacco: Never   Vaping Use    Vaping status: Never Used   Substance and Sexual Activity    Alcohol use: No     Comment: Quit     Drug use: No     Types: Marijuana     Comment: as a teenager    Sexual activity: Yes     Partners: Male     Birth control/protection: Post-menopausal       Family History:    Family History   Problem Relation Age of Onset    Cancer Mother         Lung cancer;  Dec 1996    Lung cancer Mother     Cancer Father         Primary site unknown;  1996    Prostate cancer Father     Heart disease Father     Cancer  "Sister         Pancreatic cancer;      Pancreatic cancer Sister     Heart disease Maternal Grandfather     Stroke Maternal Aunt     Diabetes Cousin     Migraines Cousin     Migraines Cousin     Breast cancer Neg Hx     Ovarian cancer Neg Hx     Uterine cancer Neg Hx     Colon cancer Neg Hx     Melanoma Neg Hx        Health Maintenance:    Health Maintenance   Topic Date Due    TDAP/TD VACCINES (2 - Td or Tdap) 2022    COVID-19 Vaccine (2023- season) 2023    DIABETIC EYE EXAM  2024    DXA SCAN  2024    LUNG CANCER SCREENING  2024    HEMOGLOBIN A1C  2024    INFLUENZA VACCINE  2024    ANNUAL WELLNESS VISIT  2025    LIPID PANEL  2025    URINE MICROALBUMIN  2025    MAMMOGRAM  2025    COLORECTAL CANCER SCREENING  10/27/2026    HEPATITIS C SCREENING  Completed    Pneumococcal Vaccine 65+  Completed    DIABETIC FOOT EXAM  Discontinued    PAP SMEAR  Discontinued    ZOSTER VACCINE  Discontinued       Review of Systems:  Please refer to history of present illness.  Review of systems otherwise negative.  Physical Exam:  Vitals:    07/10/24 0852   BP: 123/61   Pulse: 78   Resp: 18   Temp: 98.2 °F (36.8 °C)   TempSrc: Temporal   SpO2: 100%   Weight: 64 kg (141 lb 3.2 oz)   Height: 170.2 cm (67\")   PainSc: 0-No pain     Body mass index is 22.12 kg/m².  Wt Readings from Last 3 Encounters:   07/10/24 64 kg (141 lb 3.2 oz)   24 64.3 kg (141 lb 12.1 oz)   24 64.3 kg (141 lb 12.8 oz)     GENERAL: Alert, well-appearing female appearing her stated age who is in no apparent distress.   HEENT: Sclera anicteric. Head normocephalic, atraumatic. Mucus membranes moist.   BREASTS: Deferred  CARDIOVASCULAR:No peripheral edema.  RESPIRATORY: normal respiratory effort  GASTROINTESTINAL:  Abdomen is soft, non-tender, non-distended, no rebound or guarding, no masses, or hernias. No HSM.    SKIN:  Warm, dry, well-perfused.  All visible areas intact.  No " "rashes, lesions, ulcers.  PSYCHIATRIC: AO x3, with appropriate affect, normal thought processes.  NEUROLOGIC: No focal deficits.  Moves extremities well.  MUSCULOSKELETAL: Normal gait and station.   EXTREMITIES:   No cyanosis, clubbing, symmetric.  LYMPHATICS:  No cervical or inguinal adenopathy noted.     PELVIC exam: External genitalia remarkable for multiple skin tags, but were free from lesion.  Scar consistent with prior excision.  No suspicious lesions for GABINO.    ECOG PS 0    PROCEDURES:  None    Diagnostic Data:    No Images in the past 120 days found..    Lab Results   Component Value Date    WBC 8.00 03/25/2021    HGB 16.0 (H) 03/25/2021    HCT 48.9 (H) 03/25/2021    MCV 94.3 03/25/2021     03/25/2021    NEUTROABS 5.20 03/25/2021    GLUCOSE 90 03/06/2024    BUN 15 03/06/2024    CREATININE 0.84 03/06/2024    EGFRIFNONA 79 08/06/2021     03/06/2024    K 4.4 03/06/2024     03/06/2024    CO2 25.0 03/06/2024    MG 2.3 07/13/2014    CALCIUM 9.1 03/06/2024    ALBUMIN 3.7 06/18/2024    AST 23 03/06/2024    ALT 17 03/06/2024    BILITOT 0.5 03/06/2024     Lab Results   Component Value Date    TSH 1.160 12/01/2020    TSH 2.034 10/17/2016    TSH 1.404 09/24/2015    TSH 1.170 09/22/2014     No results found for: \"FT4\"  No results found for: \"\"    Assessment & Plan   This is a 73 y.o. woman with history of GABINO, intermittent symptoms concerning for recurrence  Encounter Diagnoses   Name Primary?    Post menopausal syndrome     History of vulvar dysplasia Yes     No lesions noted today concerning for GABINO  Samples given for vaginal lubricant  Patient counseled regarding risks and benefits of hormone.  States her quality of life is miserable without hormone replacement.  Refill estrogen.      Pain assessment was performed today as a part of patient’s care.  For patients with pain related to surgery, gynecologic malignancy or cancer treatment, the plan is as noted in the assessment/plan.  For patients " with pain not related to these issues, they are to seek any further needed care from a more appropriate provider, such as PCP.      No orders of the defined types were placed in this encounter.    FOLLOW UP: Dr. Hernandez as scheduled in October to establish care    I spent 21 minutes caring for Dahiana on this date of service. This time includes time spent by me in the following activities: preparing for the visit, reviewing tests, performing a medically appropriate examination and/or evaluation, counseling and educating the patient/family/caregiver, referring and communicating with other health care professionals, documenting information in the medical record, and ordering medications      Electronically Signed by: Niyah Park MD  Date: 7/10/2024

## 2024-07-29 ENCOUNTER — TELEPHONE (OUTPATIENT)
Dept: INTERNAL MEDICINE | Facility: CLINIC | Age: 74
End: 2024-07-29
Payer: MEDICARE

## 2024-07-29 NOTE — TELEPHONE ENCOUNTER
"  Caller: Bancroft, Patricia F \"Pat\"    Relationship: Self    Best call back number: [phone]     What is the best time to reach you: DAYLIGHT     What was the call regarding: PAT RECEIVED A MESSAGE THAT [facility]RA IS LEAVING AND THAT YOU ARE ASSIGNING DR FARFAN, SHE ALREADY SEES DR FARFAN.    Is it okay if the provider responds through [facility]: YES, PAT WANTS CONFIRMATION  "

## 2024-08-05 ENCOUNTER — TELEPHONE (OUTPATIENT)
Dept: GYNECOLOGIC ONCOLOGY | Facility: CLINIC | Age: 74
End: 2024-08-05

## 2024-08-05 ENCOUNTER — PATIENT MESSAGE (OUTPATIENT)
Dept: GYNECOLOGIC ONCOLOGY | Facility: CLINIC | Age: 74
End: 2024-08-05
Payer: MEDICARE

## 2024-08-05 RX ORDER — ESTRADIOL 0.05 MG/D
1 PATCH, EXTENDED RELEASE TRANSDERMAL WEEKLY
Qty: 4 PATCH | Refills: 3 | Status: SHIPPED | OUTPATIENT
Start: 2024-08-05 | End: 2024-08-06 | Stop reason: DRUGHIGH

## 2024-08-05 NOTE — TELEPHONE ENCOUNTER
"  Caller: Bancroft, Patricia F \"Pat\"    Relationship: Self    Best call back number: [phone]    What is the best time to reach you: ANYTIME    Who are you requesting to speak with (clinical staff, provider,  specific staff member): CLINICAL    What was the call regarding: PATIENT IS WAITING ON A RESPONSE FROM [facility] 7/31 REGARDING A MEDICATION ISSUE ([facility] PT IS AT ANOTHER APPT SO PLEASE CALL TO DISCUSS IN THE MORNING WITH HER.     "

## 2024-08-06 ENCOUNTER — TELEPHONE (OUTPATIENT)
Dept: GYNECOLOGIC ONCOLOGY | Facility: CLINIC | Age: 74
End: 2024-08-06
Payer: MEDICARE

## 2024-08-06 DIAGNOSIS — N95.1 POST MENOPAUSAL SYNDROME: Primary | ICD-10-CM

## 2024-08-06 NOTE — TELEPHONE ENCOUNTER
----- Message from [facility] [employer] sent at 8/6/2024 12:16 PM EDT -----  Regarding: Estradiol  Contact: [phone]  This is incorrectly written for  - they do NOT HAVE twice weekly patches in stock to send to me!  I need Rx for  generic Climara once-weekly  patches 90 days, with one refill.  They DO HAVE those to send me.  Please fix this, send me copy of your wording.  Again, call me at [phone] if questions. Thank you!

## 2024-08-20 ENCOUNTER — PROCEDURE VISIT (OUTPATIENT)
Dept: NEUROLOGY | Facility: CLINIC | Age: 74
End: 2024-08-20
Payer: MEDICARE

## 2024-08-20 ENCOUNTER — TELEPHONE (OUTPATIENT)
Dept: NEUROLOGY | Facility: CLINIC | Age: 74
End: 2024-08-20
Payer: MEDICARE

## 2024-08-20 DIAGNOSIS — G62.9 POLYNEUROPATHY: ICD-10-CM

## 2024-08-20 DIAGNOSIS — E13.40 NEUROPATHY DUE TO SECONDARY DIABETES: ICD-10-CM

## 2024-08-20 NOTE — PROGRESS NOTES
[facility]   Electrodiagnostic Laboratory    Nerve Conduction & EMG Report        Patient: Patricia F Bancroft   Patient ID: [id number]   YOB: 1950  Sex: female      Exam Physician:  Osmar Lopez MD  Refer Physician:  Franco Whittaker MD    Electromyogram and Nerve Conduction Velocity Procedure Note    Hx: 74 y.o. right handed female with complaint of numbness involving the both lower extremities. Symptoms have been present for several months and were provoked by no clear event. Significant past medical history includes nothing suggestive of neuropathy.  Family history no family history of nerve or muscle disease.    Exam: Motor power is normal. There is no atrophy. There are no fasciculations. Deep tendon reflexes are present and symmetrical. Sensory exam is abnormal distal symmetrical loss of pin and light touch in the toes.    Edx studies of the B LE were performed to evaluate for peripheral neuropathy.     NCS Examination   For sensory nerve conduction studies, the amplitude is measured peak-to-peak, the latency reported is the distal peak latency, and the conduction velocity, if measured, is determined from onset latencies and is over the forearm.   For motor nerve conduction studies, the amplitude is measured baseline-to-peak, the latency reported is the distal onset latency, the conduction velocity is calculated over the forearm, and the F wave latency is the minimum latency.   Unless otherwise noted, the hand temperature was monitored continuously and remained between 32°C and 36°C during the performance of the NCSs.      Nerve Conduction Studies  Anti Sensory Summary Table     Stim Site NR Norm Peak (ms) O-P Amp (µV) Norm O-P Amp Onset (ms) Site1 Site2 Delta-0 (ms) Dist (cm) Timi (m/s) Norm Timi (m/s)   Left Sup Fibular Anti Sensory (Ant Lat Mall)   14 cm    <4.4 *1.0 >5.0 1.8 14 cm Ant Lat Mall 1.8 14.0 78    Right Sup Fibular Anti Sensory (Ant Lat Mall)   14 cm    <4.4  6.9 >5.0 2.3 14 cm Ant Lat Mall 2.3 14.0 61    Left Sural Anti Sensory (Lat Mall)   Calf    <4.0 5.3 >5.0 3.0 Calf Lat Mall 3.0 14.0 47    Right Sural Anti Sensory (Lat Mall)   Calf    <4.0 6.9 >5.0 2.5 Calf Lat Mall 2.5 14.0 56      Motor Summary Table     Stim Site NR Onset (ms) Norm Onset (ms) O-P Amp (mV) Norm O-P Amp Site1 Site2 Delta-0 (ms) Dist (cm) Timi (m/s) Norm Timi (m/s)   Left Fibular Motor (Ext Dig Brev)   Ankle    5.9 <6.1 *1.7 >2.5 B Fib Ankle 9.0 33.0 *37 >40   B Fib    14.9  1.9  Poplt B Fib 3.5 8.0 *23 >40   Poplt    18.4  1.7          Right Fibular Motor (Ext Dig Brev)   Ankle    4.3 <6.1 *1.0 >2.5 B Fib Ankle 8.7 31.0 *36 >40   B Fib    13.0  1.2  Poplt B Fib 4.0 8.0 *20 >40   Poplt    17.0  1.1          Left Tibial Motor (Abd Bernardo Brev)   Ankle    *7.9 <6.1 *1.5 >3.0 Knee Ankle 11.8 42.0 *36 >40   Knee    19.7  0.7          Right Tibial Motor (Abd Bernardo Brev)   Ankle    *6.9 <6.1 *0.5 >3.0 Knee Ankle 11.9 40.0 *34 >40   Knee    18.8  0.5            F Wave Studies     NR F-Lat (ms) Lat Norm (ms) L-R F-Lat (ms) L-R Lat Norm   Left Fibular (Mrkrs) (EDB)      *71.25 <60 1.72 <5.1   Right Fibular (Mrkrs) (EDB)      *69.53 <60 1.72 <5.1   Left Tibial (Mrkrs) (Abd Hallucis)      *93.13 <61 *22.67 <5.7   Right Tibial (Mrkrs) (Abd Hallucis)      *70.46 <61 *22.67 <5.7     H Reflex Studies     NR H-Lat (ms) L-R H-Lat (ms) L-R Lat Norm   Left Tibial (Mrkrs) (Gastroc)      43.44 0.47 <2.0   Right Tibial (Mrkrs) (Gastroc)      42.97 0.47 <2.0       EMG Examination   The study was performed with a concentric needle electrode. Fibrillation and fasciculation activity is graded from none (0) to continuous (4+). The configuration and recruitment pattern of motor unit action potentials under voluntary control, if not normal, are described below     Side Muscle Nerve Root Ins Act Fibs Psw Amp Dur Poly Recrt Int Pat Comment   Right AntTibialis Dp Br Fibular L4-5 Nml Nml Nml *Incr Nml *2+ *Rapid *25%    Right Gastroc  Tibial S1-2 Nml Nml Nml *Incr *>12ms *2+ *Rapid *25%    Right BicepsFemL Sciatic L5-S2 Nml Nml Nml *Incr *>12ms *2+ *Rapid *25%    Right VastusMed Femoral L2-4 Nml Nml Nml *Incr *>12ms *2+ *Rapid *25%    Right Iliopsoas Femoral L2-3 Nml Nml Nml Nml Nml 0 Nml Nml    Left AntTibialis Dp Br Fibular L4-5 Nml Nml Nml *Incr *>12ms *2+ *Rapid *25%    Left Gastroc Tibial S1-2 Nml Nml Nml *Incr *>12ms *2+ *Rapid *25%    Left BicepsFemL Sciatic L5-S2 Nml Nml Nml *Incr *>12ms *2+ *Rapid *25%    Left VastusMed Femoral L2-4 Nml Nml Nml *Incr *>12ms *2+ *Rapid *25%    Left Iliopsoas Femoral L2-3 Nml Nml Nml Nml Nml 0 Nml Nml              NCV FINDINGS:  Evaluation of the left Fibular motor and the right Fibular motor nerves showed reduced amplitude (L1.7, R1.0 mV), decreased conduction velocity (B Fib-Ankle, L37, R36 m/s), and decreased conduction velocity (Poplt-B Fib, L23, R20 m/s).  The left tibial motor and the right tibial motor nerves showed prolonged distal onset latency (L7.9, R6.9 ms), reduced amplitude (L1.5, R0.5 mV), and decreased conduction velocity (Knee-Ankle, L36, R34 m/s).  The left Sup Fibular sensory nerve showed reduced amplitude (1.0 µV).  All remaining nerves (as indicated in the following tables) were within normal limits.  Left vs. Right side comparison data for the tibial motor nerve indicates abnormal L-R amplitude difference (66.7 %).  All remaining left vs. right side differences were within normal limits.  F Wave studies indicate that the left Fibular F wave has prolonged latency (71.25 ms).  The right Fibular F wave has prolonged latency (69.53 ms).  The left tibial F wave has prolonged latency (93.13 ms).  The right tibial F wave has prolonged latency (70.46 ms).  Left vs. Right comparison data for the tibial F wave indicates abnormal L-R latency difference (22.67 ms).  All remaining F Wave left vs. right side latency differences were within normal limits.  All H Reflex left vs. right side latency  differences were within normal limits.      EMG FINDINGS:  Needle evaluation of the right anterior tibialis muscle showed increased motor unit amplitude, moderately increased polyphasic potentials, early recruitment, and very decreased interference pattern.  The right gastroc, the right biceps femoris (long head), the right vastus medialis, the left anterior tibialis, the left gastroc, the left biceps femoris (long head), and the left vastus medialis muscles showed increased motor unit amplitude, increased motor unit duration, moderately increased polyphasic potentials, early recruitment, and very decreased interference pattern.  All remaining muscles (as indicated in the following table) showed no evidence of electrical instability.       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            Instrument used:  [employer] Synergy        Performed by:          Osmar Lopez MD

## 2024-08-20 NOTE — TELEPHONE ENCOUNTER
Ms Bancroft would like her EMG results mailed to her home and when we call with results she soul like a detailed message left if she does not answer.

## 2024-08-20 NOTE — LETTER
August 20, 2024       No Recipients    Patient: Patricia F Bancroft   YOB: 1950   Date of Visit: 8/20/2024     Dear ROSA ELENA Ramirez:       Thank you for referring Patricia Bancroft to me for evaluation. Below are the relevant portions of my assessment and plan of care.    If you have questions, please do not hesitate to call me. I look forward to following [place] along with you.         Sincerely,        Osmar Lopez MD        CC:   No Recipients    Osmar Lopez MD  08/20/24 1019  Sign when Signing Visit      [facility]   Electrodiagnostic Laboratory    Nerve Conduction & EMG Report        Patient: Patricia F Bancroft   Patient ID: [id number]   YOB: 1950  Sex: female      Exam Physician:  Osmar Lopez MD  Refer Physician:  Franco Whittaker MD    Electromyogram and Nerve Conduction Velocity Procedure Note    Hx: 74 y.o. right handed female with complaint of numbness involving the both lower extremities. Symptoms have been present for several months and were provoked by no clear event. Significant past medical history includes nothing suggestive of neuropathy.  Family history no family history of nerve or muscle disease.    Exam: Motor power is normal. There is no atrophy. There are no fasciculations. Deep tendon reflexes are present and symmetrical. Sensory exam is abnormal distal symmetrical loss of pin and light touch in the toes.    Edx studies of the B LE were performed to evaluate for peripheral neuropathy.     NCS Examination   For sensory nerve conduction studies, the amplitude is measured peak-to-peak, the latency reported is the distal peak latency, and the conduction velocity, if measured, is determined from onset latencies and is over the forearm.   For motor nerve conduction studies, the amplitude is measured baseline-to-peak, the latency reported is the distal onset latency, the conduction velocity is calculated over the forearm, and  the F wave latency is the minimum latency.   Unless otherwise noted, the hand temperature was monitored continuously and remained between 32°C and 36°C during the performance of the NCSs.      Nerve Conduction Studies  Anti Sensory Summary Table     Stim Site NR Norm Peak (ms) O-P Amp (µV) Norm O-P Amp Onset (ms) Site1 Site2 Delta-0 (ms) Dist (cm) Timi (m/s) Norm Timi (m/s)   Left Sup Fibular Anti Sensory (Ant Lat Mall)   14 cm    <4.4 *1.0 >5.0 1.8 14 cm Ant Lat Mall 1.8 14.0 78    Right Sup Fibular Anti Sensory (Ant Lat Mall)   14 cm    <4.4 6.9 >5.0 2.3 14 cm Ant Lat Mall 2.3 14.0 61    Left Sural Anti Sensory (Lat Mall)   Calf    <4.0 5.3 >5.0 3.0 Calf Lat Mall 3.0 14.0 47    Right Sural Anti Sensory (Lat Mall)   Calf    <4.0 6.9 >5.0 2.5 Calf Lat Mall 2.5 14.0 56      Motor Summary Table     Stim Site NR Onset (ms) Norm Onset (ms) O-P Amp (mV) Norm O-P Amp Site1 Site2 Delta-0 (ms) Dist (cm) Timi (m/s) Norm Timi (m/s)   Left Fibular Motor (Ext Dig Brev)   Ankle    5.9 <6.1 *1.7 >2.5 B Fib Ankle 9.0 33.0 *37 >40   B Fib    14.9  1.9  Poplt B Fib 3.5 8.0 *23 >40   Poplt    18.4  1.7          Right Fibular Motor (Ext Dig Brev)   Ankle    4.3 <6.1 *1.0 >2.5 B Fib Ankle 8.7 31.0 *36 >40   B Fib    13.0  1.2  Poplt B Fib 4.0 8.0 *20 >40   Poplt    17.0  1.1          Left Tibial Motor (Abd Bernardo Brev)   Ankle    *7.9 <6.1 *1.5 >3.0 Knee Ankle 11.8 42.0 *36 >40   Knee    19.7  0.7          Right Tibial Motor (Abd Bernardo Brev)   Ankle    *6.9 <6.1 *0.5 >3.0 Knee Ankle 11.9 40.0 *34 >40   Knee    18.8  0.5            F Wave Studies     NR F-Lat (ms) Lat Norm (ms) L-R F-Lat (ms) L-R Lat Norm   Left Fibular (Mrkrs) (EDB)      *71.25 <60 1.72 <5.1   Right Fibular (Mrkrs) (EDB)      *69.53 <60 1.72 <5.1   Left Tibial (Mrkrs) (Abd Hallucis)      *93.13 <61 *22.67 <5.7   Right Tibial (Mrkrs) (Abd Hallucis)      *70.46 <61 *22.67 <5.7     H Reflex Studies     NR H-Lat (ms) L-R H-Lat (ms) L-R Lat Norm   Left Tibial (Mrkrs) (Gastroc)       43.44 0.47 <2.0   Right Tibial (Mrkrs) (Gastroc)      42.97 0.47 <2.0       EMG Examination   The study was performed with a concentric needle electrode. Fibrillation and fasciculation activity is graded from none (0) to continuous (4+). The configuration and recruitment pattern of motor unit action potentials under voluntary control, if not normal, are described below     Side Muscle Nerve Root Ins Act Fibs Psw Amp Dur Poly Recrt Int Pat Comment   Right AntTibialis Dp Br Fibular L4-5 Nml Nml Nml *Incr Nml *2+ *Rapid *25%    Right Gastroc Tibial S1-2 Nml Nml Nml *Incr *>12ms *2+ *Rapid *25%    Right BicepsFemL Sciatic L5-S2 Nml Nml Nml *Incr *>12ms *2+ *Rapid *25%    Right VastusMed Femoral L2-4 Nml Nml Nml *Incr *>12ms *2+ *Rapid *25%    Right Iliopsoas Femoral L2-3 Nml Nml Nml Nml Nml 0 Nml Nml    Left AntTibialis Dp Br Fibular L4-5 Nml Nml Nml *Incr *>12ms *2+ *Rapid *25%    Left Gastroc Tibial S1-2 Nml Nml Nml *Incr *>12ms *2+ *Rapid *25%    Left BicepsFemL Sciatic L5-S2 Nml Nml Nml *Incr *>12ms *2+ *Rapid *25%    Left VastusMed Femoral L2-4 Nml Nml Nml *Incr *>12ms *2+ *Rapid *25%    Left Iliopsoas Femoral L2-3 Nml Nml Nml Nml Nml 0 Nml Nml              NCV FINDINGS:  Evaluation of the left Fibular motor and the right Fibular motor nerves showed reduced amplitude (L1.7, R1.0 mV), decreased conduction velocity (B Fib-Ankle, L37, R36 m/s), and decreased conduction velocity (Poplt-B Fib, L23, R20 m/s).  The left tibial motor and the right tibial motor nerves showed prolonged distal onset latency (L7.9, R6.9 ms), reduced amplitude (L1.5, R0.5 mV), and decreased conduction velocity (Knee-Ankle, L36, R34 m/s).  The left Sup Fibular sensory nerve showed reduced amplitude (1.0 µV).  All remaining nerves (as indicated in the following tables) were within normal limits.  Left vs. Right side comparison data for the tibial motor nerve indicates abnormal L-R amplitude difference (66.7 %).  All remaining left vs. right side  differences were within normal limits.  F Wave studies indicate that the left Fibular F wave has prolonged latency (71.25 ms).  The right Fibular F wave has prolonged latency (69.53 ms).  The left tibial F wave has prolonged latency (93.13 ms).  The right tibial F wave has prolonged latency (70.46 ms).  Left vs. Right comparison data for the tibial F wave indicates abnormal L-R latency difference (22.67 ms).  All remaining F Wave left vs. right side latency differences were within normal limits.  All H Reflex left vs. right side latency differences were within normal limits.      EMG FINDINGS:  Needle evaluation of the right anterior tibialis muscle showed increased motor unit amplitude, moderately increased polyphasic potentials, early recruitment, and very decreased interference pattern.  The right gastroc, the right biceps femoris (long head), the right vastus medialis, the left anterior tibialis, the left gastroc, the left biceps femoris (long head), and the left vastus medialis muscles showed increased motor unit amplitude, increased motor unit duration, moderately increased polyphasic potentials, early recruitment, and very decreased interference pattern.  All remaining muscles (as indicated in the following table) showed no evidence of electrical instability.       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            Instrument used:  Teca Synergy        Performed by:          Osmar Lopez MD

## 2024-08-26 ENCOUNTER — TELEPHONE (OUTPATIENT)
Dept: GYNECOLOGIC ONCOLOGY | Facility: CLINIC | Age: 74
End: 2024-08-26
Payer: MEDICARE

## 2024-08-26 NOTE — TELEPHONE ENCOUNTER
"  Caller: Bancroft, Patricia F \"Pat\"    Relationship: Self    Best call back number: [phone]       Who are you requesting to speak with (clinical staff, provider,  specific staff member): CLINICAL    Do you know the name of the person who called: SHANKAR    What was the call regarding: ESTRODIAL PATCHES   "

## 2024-08-29 NOTE — TELEPHONE ENCOUNTER
Patient calls with c/o of script wishing for 90 day supply instead of 30 day supply for pharmacy delivery. Patient states she has to call for refills monthly and with a revised order for 90 days, it will be easier.

## 2024-09-05 NOTE — TELEPHONE ENCOUNTER
Dr Park sent a 1 month supply during her recent apt. Since she will only be returning here PRN, it would be most appropriate for Pat to discuss Rx mgmt with Dr Hernandez at upcoming apt on 10/28/24 and request that she send new 90 day rx in.

## 2024-09-05 NOTE — TELEPHONE ENCOUNTER
Patient left message on voice mail providing providers comment and recommendations for script refills from here and forward as she is prn for us the provider wishes her regular gyn to take over medication refills

## 2024-09-09 ENCOUNTER — HOSPITAL ENCOUNTER (OUTPATIENT)
Dept: BONE DENSITY | Facility: HOSPITAL | Age: 74
Discharge: HOME | End: 2024-09-09
Payer: MEDICARE

## 2024-09-09 ENCOUNTER — HOSPITAL ENCOUNTER (OUTPATIENT)
Dept: CT IMAGING | Facility: HOSPITAL | Age: 74
Discharge: HOME | End: 2024-09-09
Payer: MEDICARE

## 2024-09-09 ENCOUNTER — HOSPITAL ENCOUNTER (OUTPATIENT)
Dept: MAMMOGRAPHY | Facility: HOSPITAL | Age: 74
Discharge: HOME | End: 2024-09-09
Payer: MEDICARE

## 2024-09-09 DIAGNOSIS — Z87.891 PERSONAL HISTORY OF NICOTINE DEPENDENCE: ICD-10-CM

## 2024-09-09 DIAGNOSIS — Z12.31 ENCOUNTER FOR SCREENING MAMMOGRAM FOR MALIGNANT NEOPLASM OF BREAST: ICD-10-CM

## 2024-09-09 DIAGNOSIS — Z78.0 POSTMENOPAUSE: ICD-10-CM

## 2024-09-09 DIAGNOSIS — Z12.2 ENCOUNTER FOR SCREENING FOR LUNG CANCER: ICD-10-CM

## 2024-09-09 DIAGNOSIS — J98.4 PULMONARY CALCIFICATION DETERMINED BY X-RAY: ICD-10-CM

## 2024-09-09 PROCEDURE — 77063 BREAST TOMOSYNTHESIS BI: CPT

## 2024-09-09 PROCEDURE — 77067 SCR MAMMO BI INCL CAD: CPT

## 2024-09-09 PROCEDURE — 71271 CT THORAX LUNG CANCER SCR C-: CPT

## 2024-09-09 PROCEDURE — 77080 DXA BONE DENSITY AXIAL: CPT

## 2024-09-10 ENCOUNTER — OFFICE VISIT (OUTPATIENT)
Dept: NEUROLOGY | Facility: CLINIC | Age: 74
End: 2024-09-10
Payer: MEDICARE

## 2024-09-10 ENCOUNTER — OFFICE VISIT (OUTPATIENT)
Dept: INTERNAL MEDICINE | Facility: CLINIC | Age: 74
End: 2024-09-10
Payer: MEDICARE

## 2024-09-10 VITALS
SYSTOLIC BLOOD PRESSURE: 132 MMHG | HEIGHT: 67 IN | DIASTOLIC BLOOD PRESSURE: 88 MMHG | BODY MASS INDEX: 22.44 KG/M2 | OXYGEN SATURATION: 96 % | WEIGHT: 143 LBS | HEART RATE: 65 BPM

## 2024-09-10 VITALS
SYSTOLIC BLOOD PRESSURE: 136 MMHG | TEMPERATURE: 97.1 F | BODY MASS INDEX: 22.49 KG/M2 | WEIGHT: 143.6 LBS | HEART RATE: 74 BPM | DIASTOLIC BLOOD PRESSURE: 66 MMHG | RESPIRATION RATE: 18 BRPM

## 2024-09-10 DIAGNOSIS — R73.03 PREDIABETES: Primary | ICD-10-CM

## 2024-09-10 DIAGNOSIS — M85.89 OSTEOPENIA OF MULTIPLE SITES: ICD-10-CM

## 2024-09-10 DIAGNOSIS — I25.10 CORONARY ARTERY DISEASE INVOLVING NATIVE CORONARY ARTERY OF NATIVE HEART WITHOUT ANGINA PECTORIS: ICD-10-CM

## 2024-09-10 DIAGNOSIS — G62.9 POLYNEUROPATHY: ICD-10-CM

## 2024-09-10 DIAGNOSIS — Z23 ENCOUNTER FOR VACCINATION: ICD-10-CM

## 2024-09-10 DIAGNOSIS — E78.5 HYPERLIPIDEMIA LDL GOAL <70: ICD-10-CM

## 2024-09-10 DIAGNOSIS — G62.9 POLYNEUROPATHY: Primary | ICD-10-CM

## 2024-09-10 DIAGNOSIS — M26.609 TMJ (TEMPOROMANDIBULAR JOINT DISORDER): ICD-10-CM

## 2024-09-10 PROBLEM — Z00.00 MEDICARE ANNUAL WELLNESS VISIT, SUBSEQUENT: Status: RESOLVED | Noted: 2023-01-17 | Resolved: 2024-09-10

## 2024-09-10 PROBLEM — Z87.412 HISTORY OF VULVAR DYSPLASIA: Status: RESOLVED | Noted: 2024-07-10 | Resolved: 2024-09-10

## 2024-09-10 LAB
25(OH)D3 SERPL-MCNC: 32.3 NG/ML (ref 30–100)
CHOLEST SERPL-MCNC: 189 MG/DL (ref 0–200)
HBA1C MFR BLD: 5.8 % (ref 4.8–5.6)
HDLC SERPL-MCNC: 80 MG/DL (ref 40–60)
LDLC SERPL CALC-MCNC: 92 MG/DL (ref 0–100)
LDLC/HDLC SERPL: 1.13 {RATIO}
TRIGL SERPL-MCNC: 95 MG/DL (ref 0–150)
VLDLC SERPL-MCNC: 17 MG/DL (ref 5–40)

## 2024-09-10 PROCEDURE — 83036 HEMOGLOBIN GLYCOSYLATED A1C: CPT | Performed by: STUDENT IN AN ORGANIZED HEALTH CARE EDUCATION/TRAINING PROGRAM

## 2024-09-10 PROCEDURE — 1159F MED LIST DOCD IN RCRD: CPT | Performed by: NURSE PRACTITIONER

## 2024-09-10 PROCEDURE — 99214 OFFICE O/P EST MOD 30 MIN: CPT | Performed by: NURSE PRACTITIONER

## 2024-09-10 PROCEDURE — 3044F HG A1C LEVEL LT 7.0%: CPT | Performed by: STUDENT IN AN ORGANIZED HEALTH CARE EDUCATION/TRAINING PROGRAM

## 2024-09-10 PROCEDURE — 80061 LIPID PANEL: CPT | Performed by: STUDENT IN AN ORGANIZED HEALTH CARE EDUCATION/TRAINING PROGRAM

## 2024-09-10 PROCEDURE — 1159F MED LIST DOCD IN RCRD: CPT | Performed by: STUDENT IN AN ORGANIZED HEALTH CARE EDUCATION/TRAINING PROGRAM

## 2024-09-10 PROCEDURE — 36415 COLL VENOUS BLD VENIPUNCTURE: CPT | Performed by: STUDENT IN AN ORGANIZED HEALTH CARE EDUCATION/TRAINING PROGRAM

## 2024-09-10 PROCEDURE — 3075F SYST BP GE 130 - 139MM HG: CPT | Performed by: NURSE PRACTITIONER

## 2024-09-10 PROCEDURE — 1126F AMNT PAIN NOTED NONE PRSNT: CPT | Performed by: STUDENT IN AN ORGANIZED HEALTH CARE EDUCATION/TRAINING PROGRAM

## 2024-09-10 PROCEDURE — 82570 ASSAY OF URINE CREATININE: CPT | Performed by: STUDENT IN AN ORGANIZED HEALTH CARE EDUCATION/TRAINING PROGRAM

## 2024-09-10 PROCEDURE — 3078F DIAST BP <80 MM HG: CPT | Performed by: STUDENT IN AN ORGANIZED HEALTH CARE EDUCATION/TRAINING PROGRAM

## 2024-09-10 PROCEDURE — 82043 UR ALBUMIN QUANTITATIVE: CPT | Performed by: STUDENT IN AN ORGANIZED HEALTH CARE EDUCATION/TRAINING PROGRAM

## 2024-09-10 PROCEDURE — 1160F RVW MEDS BY RX/DR IN RCRD: CPT | Performed by: STUDENT IN AN ORGANIZED HEALTH CARE EDUCATION/TRAINING PROGRAM

## 2024-09-10 PROCEDURE — 99214 OFFICE O/P EST MOD 30 MIN: CPT | Performed by: STUDENT IN AN ORGANIZED HEALTH CARE EDUCATION/TRAINING PROGRAM

## 2024-09-10 PROCEDURE — 82306 VITAMIN D 25 HYDROXY: CPT | Performed by: STUDENT IN AN ORGANIZED HEALTH CARE EDUCATION/TRAINING PROGRAM

## 2024-09-10 PROCEDURE — 77067 SCR MAMMO BI INCL CAD: CPT | Performed by: RADIOLOGY

## 2024-09-10 PROCEDURE — 3079F DIAST BP 80-89 MM HG: CPT | Performed by: NURSE PRACTITIONER

## 2024-09-10 PROCEDURE — 3075F SYST BP GE 130 - 139MM HG: CPT | Performed by: STUDENT IN AN ORGANIZED HEALTH CARE EDUCATION/TRAINING PROGRAM

## 2024-09-10 PROCEDURE — 77063 BREAST TOMOSYNTHESIS BI: CPT | Performed by: RADIOLOGY

## 2024-09-10 PROCEDURE — 1160F RVW MEDS BY RX/DR IN RCRD: CPT | Performed by: NURSE PRACTITIONER

## 2024-09-10 PROCEDURE — G2211 COMPLEX E/M VISIT ADD ON: HCPCS | Performed by: STUDENT IN AN ORGANIZED HEALTH CARE EDUCATION/TRAINING PROGRAM

## 2024-09-10 NOTE — PROGRESS NOTES
Follow Up Office Visit      Date: 09/10/2024   Patient Name: Patricia F Bancroft  : 1950   MRN: [id number]     Chief Complaint:    Chief Complaint   Patient presents with    Diabetes     fu       History of Present Illness: Patricia F Bancroft is a 74 y.o. female who is here today to follow up with chronic care management.    History of Present Illness  The patient is a 74-year-old female here for chronic care management.    She is seeking clarification on her bone density test results, specifically the T-scores and Z-scores. She recalls that last year, the radiologist was unable to compare her results with previous records due to their unavailability. She has been dealing with this issue since the  and noticed an improvement once she started exercising regularly. She is not currently taking any vitamin D supplements and is unsure about the necessary dosage.    She is curious about her mammogram results. She has not received a tetanus shot in a long time and is considering getting the COVID-19 vaccine at [facility]. She usually requests the senior dose of the flu vaccine. She has never received the RSV vaccine.    She is shelter through a 10-day course of amoxicillin prescribed by her dentist for a suspected sinus infection. She experienced severe pain, which was not alleviated by a tooth filling, leading her to suspect TMJ. The only relief she found was from [facility] [facility]. Her condition has improved but is not completely resolved. She is unsure how to determine if it is TMJ and whether she should return to the dentist. She experiences pain in her upper back wisdom tooth and a swollen spot under her jaw that is sensitive to touch. The pain is intermittent, lasting for half an hour, then subsiding for 3 to 4 hours before returning for an hour. She had severe pain last night. She cannot take Aleve due to an allergic reaction to ibuprofen years ago, which caused hives. She occasionally takes Tylenol,  no more than twice a day.    She is concerned about her sugar levels. Her neuropathy symptoms have remained stable. Physical therapy at the [place] office was beneficial, but she did not find the same relief at [facility]. She is unsure if she has any more visits covered by Medicare.    She is scheduled to see a neurologist today. She did not fast this morning as she was not expecting to have labs done. She plans to travel to [place] next Tuesday. She does not smoke.    ALLERGIES  She is allergic to IBUPROFEN.      Subjective      Review of Systems:   Review of Systems   All other systems reviewed and are negative.      I have reviewed the patients family history, social history, past medical history, past surgical history and have updated it as appropriate.     Medications:     Current Outpatient Medications:     aspirin 81 MG chewable tablet, Chew 1 tablet Daily., Disp: , Rfl:     cetirizine (zyrTEC) 10 MG tablet, Take 1 tablet by mouth Daily., Disp: , Rfl:     Crestor 40 MG tablet, TAKE ONE TABLET BY MOUTH EVERY DAY, Disp: 90 tablet, Rfl: 3    estradiol (CLIMARA) 0.025 MG/24HR patch, Place 1 patch on the skin as directed by provider 1 (One) Time Per Week for 358 days., Disp: 4 patch, Rfl: 11    famotidine (PEPCID) 20 MG tablet, Take 1 tablet by mouth 2 (Two) Times a Day., Disp: 180 tablet, Rfl: 3    fluticasone (FLONASE) 50 MCG/ACT nasal spray, 2 sprays into the nostril(s) as directed by provider Daily., Disp: , Rfl:     lisinopril (PRINIVIL,ZESTRIL) 10 MG tablet, TAKE ONE TABLET BY MOUTH EVERY DAY, Disp: 90 tablet, Rfl: 3    Polyethylene Glycol 3350 (MIRALAX PO), Take  by mouth., Disp: , Rfl:     Toprol XL 25 MG 24 hr tablet, TAKE ONE TABLET BY MOUTH EVERY DAY, Disp: 90 tablet, Rfl: 1    RSVPreF3 Vac Recomb Adjuvanted (AREXVY) 120 MCG/0.5ML reconstituted suspension injection, Inject 0.5 mL into the appropriate muscle as directed by prescriber 1 (One) Time for 1 dose., Disp: 0.5 mL, Rfl: 0    Tdap  (BOOSTRIX) 5-2.5-18.5 LF-MCG/0.5 injection, Inject 0.5 mL into the appropriate muscle as directed by prescriber 1 (One) Time for 1 dose., Disp: 0.5 mL, Rfl: 0    Allergies:   Allergies   Allergen Reactions    Other Other (See Comments)    Procaine Other (See Comments)    Ciprofloxacin Myalgia and Other (See Comments)    Dust Mite Extract Cough, Itching and Other (See Comments)     Household Dust, and Animal Dander    Ibuprofen Hives       Objective     Physical Exam: Please see above  Vital Signs:   Vitals:    09/10/24 0822   BP: 136/66   BP Location: Right arm   Patient Position: Sitting   Cuff Size: Adult   Pulse: 74   Resp: 18   Temp: 97.1 °F (36.2 °C)   TempSrc: Temporal   Weight: 65.1 kg (143 lb 9.6 oz)   PainSc: 0-No pain     Body mass index is 22.49 kg/m².  BMI is within normal parameters. No other follow-up for BMI required.       Physical Exam  Vitals and nursing note reviewed.   Constitutional:       General: She is not in acute distress.     Appearance: Normal appearance. She is normal weight. She is not ill-appearing or toxic-appearing.   HENT:      Nose: No congestion or rhinorrhea.   Eyes:      General:         Right eye: No discharge.         Left eye: No discharge.      Conjunctiva/sclera: Conjunctivae normal.   Pulmonary:      Effort: Pulmonary effort is normal. No respiratory distress.   Abdominal:      General: Abdomen is flat. There is no distension.   Musculoskeletal:      Cervical back: Normal range of motion.   Skin:     Coloration: Skin is not jaundiced.      Findings: No rash.   Neurological:      General: No focal deficit present.      Mental Status: She is alert. Mental status is at baseline.      Coordination: Coordination normal.      Gait: Gait normal.   Psychiatric:         Mood and Affect: Mood normal.         Behavior: Behavior normal.         Thought Content: Thought content normal.         Judgment: Judgment normal.         Procedures    Results:   Results  Imaging  Bone density  test shows an increase in bone density by 15%.    Labs:   Hemoglobin A1C   Date Value Ref Range Status   03/06/2024 5.6 4.5 - 5.7 % Final   12/06/2021 6.10 (H) 4.80 - 5.60 % Final     TSH   Date Value Ref Range Status   12/01/2020 1.160 0.270 - 4.200 uIU/mL Final        Imaging:   No valid procedures specified.     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 Osteopenia.  The DEXA scan results show a 15% increase in bone density, indicating improvement from osteopenia towards normal bone density. She was advised to continue vitamin D supplementation at 1000 units daily, weight-bearing exercises, and maintaining a well-balanced diet. If vitamin D levels are found to be deficient, a high dose of 50,000 units once a week will be prescribed.    2. Temporomandibular Joint (TMJ) Disorder.  The  possibility of TMJ disorder was discussed. She was advised to complete her current course of amoxicillin and take Tylenol every 6 to 12 hours for an additional 2 days. If symptoms persist, further evaluation will be considered.    3. Bacterial Sinusitis.  She is currently jail through a 10-day course of amoxicillin prescribed by her dentist. She was advised to complete the course and monitor her symptoms. If symptoms worsen, further treatment options will be explored.    4. Neuropathy.  Potential causes for her neuropathy, including demyelination due to unknown or autoimmune processes, were discussed. The nerve conduction study showed some abnormalities suggesting a demyelinating process. She will follow up with neurology for further evaluation and management.    5. Prediabetes.  Her A1c will be rechecked today to ensure she is not trending towards diabetes. She was advised to maintain a healthy diet low in carbohydrates and sugar and engage in regular exercise. Walking after meals was recommended to help reduce blood sugar levels.    6. Coronary Artery Disease.  Given her history of coronary artery disease, maintaining cholesterol levels, specifically aiming for an LDL below 70, is crucial. Her current regimen includes aspirin and rosuvastatin 40 mg. Her cholesterol levels will be rechecked today. If LDL levels remain high, the addition of Zetia to her current regimen will be considered. She prefers to discuss any changes with her cardiologist.    7. Health Maintenance.  She was informed about the availability of the influenza vaccine in the clinic from 10/05/2024. The tetanus vaccine was sent to her pharmacy at [facility]. She was advised to get her Covid and flu shots at [facility]. The RSV vaccine was also recommended and can be obtained at [facility].      Follow Up:   Return in about 6 months (around 3/10/2025) for Recheck.        Patient or patient representative verbalized consent for the use of Ambient Listening  during the visit with  Franco Whittaker MD for chart documentation. 9/10/2024  11:15 EDT    Franco Whittaker MD  [facility]

## 2024-09-10 NOTE — PROGRESS NOTES
Neuro Office Visit      Encounter Date: 09/10/2024   Patient Name: Patricia F Bancroft  : 1950   MRN: [id number]   PCP:  Franco Whittaker MD     Chief Complaint:    Chief Complaint   Patient presents with    Polyneuropathy     F/U       History of Present Illness: Patricia F Bancroft is a 74 y.o. female who is here today in Neurology for neuropathy.    Last visit with me on 2024 with labs, EMG ordered, referred to physical therapy.  History of Present Illness  She reports that her symptoms are primarily numbness and tingling. An evaluation revealed that one leg is weaker than the other and her flexibility is not optimal.     It has been approximately a month since she completed physical therapy.    EMG results reviewed and neuropathy seems to be idiopathic in nature.      Danielle states that her physical therapist thought she might have an issue in her lumbar spine that could be causing the neuropathy.  She denies any back pain.  Previously she experienced sciatic pain in the early s, which was managed with physical therapy. She reports no back tightness or abnormal sensations across her back.    She maintains an active lifestyle and engages in weight-bearing exercises. She notes that her bone density has increased by 15 percent over the past two years.    Progress Notes by Osmar Lopez MD (2024 09:45)     Subjective      Past Medical History:   Past Medical History:   Diagnosis Date    Allergic     Allergic rhinitis     Seasonal allergies    Coronary artery disease     S/P JAN to RCA, May 2014 EF 65% no residual Left system disease    Dyslipidemia     Dysosmia     Headache, tension-type     Heart murmur     History of tobacco use 2020    History of vulvar dysplasia 07/10/2024    HL (hearing loss) 1970    Tinnitus    Hx of migraines     Hyperlipidemia     Hypertension     Medicare annual wellness visit, subsequent 2023    Migraine     Mitral valve prolapse     Osteopenia      Osteopenia improving    Pneumonia 1971    Prediabetes 10/07/2016    Urinary tract infection 2016?       Past Surgical History:   Past Surgical History:   Procedure Laterality Date    BREAST BIOPSY Left     BREAST EXCISIONAL BIOPSY Left     BREAST SURGERY      breast biopsy X2    CARDIAC CATHETERIZATION  2014    Also     COLONOSCOPY      COLONOSCOPY      cologaurd     COLONOSCOPY      2009    CORONARY STENT PLACEMENT  2014    HYSTERECTOMY  2004    OOPHORECTOMY      VULVECTOMY  2021       Family History:   Family History   Problem Relation Age of Onset    Cancer Mother         Lung cancer;  Dec 1996    Lung cancer Mother     Cancer Father         Primary site unknown;  1996    Prostate cancer Father     Heart disease Father     Cancer Sister         Pancreatic cancer;      Pancreatic cancer Sister     Heart disease Maternal Grandfather     Stroke Maternal Aunt     Diabetes Cousin     Migraines Cousin     Migraines Cousin     Breast cancer Neg Hx     Ovarian cancer Neg Hx     Uterine cancer Neg Hx     Colon cancer Neg Hx     Melanoma Neg Hx        Social History:   Social History     Socioeconomic History    Marital status:    Tobacco Use    Smoking status: Former     Current packs/day: 0.00     Average packs/day: 1.5 packs/day for 43.6 years (65.4 ttl pk-yrs)     Types: Cigarettes     Start date: 1966     Quit date: 2009     Years since quitting: 15.0     Passive exposure: Past    Smokeless tobacco: Never   Vaping Use    Vaping status: Never Used   Substance and Sexual Activity    Alcohol use: No     Comment: Quit     Drug use: No     Types: Marijuana     Comment: as a teenager    Sexual activity: Yes     Partners: Male     Birth control/protection: Post-menopausal, Hysterectomy       Medications:     Current Outpatient Medications:     aspirin 81 MG chewable tablet, Chew 1 tablet Daily., Disp: , Rfl:     cetirizine (zyrTEC) 10  MG tablet, Take 1 tablet by mouth Daily., Disp: , Rfl:     COVID-19 mRNA Virus Vaccine (COMIRNATY IM), Inject  into the appropriate muscle as directed by prescriber., Disp: , Rfl:     Crestor 40 MG tablet, TAKE ONE TABLET BY MOUTH EVERY DAY, Disp: 90 tablet, Rfl: 3    estradiol (CLIMARA) 0.025 MG/24HR patch, Place 1 patch on the skin as directed by provider 1 (One) Time Per Week for 358 days., Disp: 4 patch, Rfl: 11    famotidine (PEPCID) 20 MG tablet, Take 1 tablet by mouth 2 (Two) Times a Day., Disp: 180 tablet, Rfl: 3    fluticasone (FLONASE) 50 MCG/ACT nasal spray, 2 sprays into the nostril(s) as directed by provider Daily., Disp: , Rfl:     Influenza Vac Split High-Dose (FLUZONE HIGH-DOSE IM), Inject  into the appropriate muscle as directed by prescriber. TRIV, Disp: , Rfl:     lisinopril (PRINIVIL,ZESTRIL) 10 MG tablet, TAKE ONE TABLET BY MOUTH EVERY DAY, Disp: 90 tablet, Rfl: 3    Polyethylene Glycol 3350 (MIRALAX PO), Take  by mouth., Disp: , Rfl:     RSVPreF3 Vac Recomb Adjuvanted (AREXVY) 120 MCG/0.5ML reconstituted suspension injection, Inject 0.5 mL into the appropriate muscle as directed by prescriber 1 (One) Time for 1 dose., Disp: 0.5 mL, Rfl: 0    Tdap (BOOSTRIX) 5-2.5-18.5 LF-MCG/0.5 injection, Inject 0.5 mL into the appropriate muscle as directed by prescriber 1 (One) Time for 1 dose., Disp: 0.5 mL, Rfl: 0    Toprol XL 25 MG 24 hr tablet, TAKE ONE TABLET BY MOUTH EVERY DAY, Disp: 90 tablet, Rfl: 1    Allergies:   Allergies   Allergen Reactions    Other Other (See Comments)    Procaine Other (See Comments)    Ciprofloxacin Myalgia and Other (See Comments)    Dust Mite Extract Cough, Itching and Other (See Comments)     Household Dust, and Animal Dander    Ibuprofen Hives       PHQ-9 Total Score:     STEADI Fall Risk Assessment was completed, and patient is at LOW risk for falls.Assessment completed on:9/10/2024    Objective     Physical Exam:     Neurological Exam  Mental Status  Awake, alert and  "oriented to person, place and time. Recent and remote memory are intact. Speech is normal. Language is fluent with no aphasia. Attention and concentration are normal. Fund of knowledge is appropriate for level of education.    Cranial Nerves  CN II: Visual acuity is normal.  CN III, IV, VI: Extraocular movements intact bilaterally. Pupils equal round and reactive to light bilaterally.  CN V: Facial sensation is normal.  CN VII: Full and symmetric facial movement.  CN IX, X: Palate elevates symmetrically  CN XI: Shoulder shrug strength is normal.  CN XII: Tongue midline without atrophy or fasciculations.    Motor  Normal muscle bulk throughout. No fasciculations present. Normal muscle tone. Strength is 5/5 throughout all four extremities.    Sensory  Sensation is intact to light touch, pinprick, vibration and proprioception in all four extremities.    Reflexes                                            Right                      Left  Brachioradialis                    2+                         2+  Biceps                                 2+                         2+  Triceps                                2+                         2+  Finger flex                           2+                         2+  Hamstring                            2+                         2+  Patellar                                2+                         2+  Achilles                                2+                         2+    Coordination    Finger-to-nose, rapid alternating movements and heel-to-shin normal bilaterally without dysmetria.    Gait  Normal casual, toe, heel and tandem gait.        Vital Signs:   Vitals:    09/10/24 1129   BP: 132/88   Pulse: 65   SpO2: 96%   Weight: 64.9 kg (143 lb)   Height: 170.2 cm (67\")     Body mass index is 22.4 kg/m².     Results:   Results  Testing  EMG showed decreased conduction velocity in both left and right tibial nerves.     Imaging:   Mammo Screening Digital Tomosynthesis Bilateral " With CAD    Result Date: 9/10/2024  Negative bilateral mammogram.  RECOMMENDATION:  Continue annual screening mammography.  BI-RADS CATEGORY 1, NEGATIVE.   CAD was utilized.  The standard false-negative rate of mammography is between 10% and 25%. Complex patterns or increased breast density will markedly elevate the false-negative rate of mammography.   A letter, in lay terminology, with the results of this exam will be mailed to the patient.   This report was finalized on 9/10/2024 9:23 AM by Dr. Marlene Woods MD.      DEXA Bone Density Axial    Result Date: 9/9/2024  Osteopenia of the left femoral neck, total left hip. The ten year fracture risk assessment was not calculated because of the patient's history with hormone replacement therapy. All the treatment decisions require clinical judgment and consideration of individual patient factors, including patient preferences, co-morbidities, previous drug use, risk factors not captured in the FRAX model (frailty, falls, vitamin D deficiency, increased bone turnover, interval significant decline in bone density) and possible under or over estimation of fracture risk by FRAX. Approaches to reduce osteoporosis related fracture risk include optimizing calcium and vitamin D status, appropriate weight bearing exercises and fall-prevention measurements.  The National Osteoporosis Foundation recommends (http://www.nof.org/hcp/practice/practice-and-clinical-guidelines/clinicians-guide) that FDA-approved medical therapies be considered in postmenopausal women and men aged equal or greater than 50 years with :  a) hip or vertebral (clinical or morphometric) fracture; b) T-score of -2.5 or less at the spine or hip; c) Ten-year fracture probability by FRAX of greater than 3% for hip fracture  of greater than 20% for major osteoporotic fracture.  Secondary causes of bone loss should be evaluated if clinically indicated since the etiology of low BMD cannot be determined by BMD  "measurement alone. FOLLOWUP: Consider repeating the study in 2-3 years to reassess the patient's status or sooner if there is some new clinical indication. INTERVAL CHANGE:   There was an increase in bone mineral density of the L1-L4 vertebrae by 15.2%, and total left hip by 1.1% when compared to previous study performed on 8/29/2022.   At this facility, the least significant change in the BMD at the left hip with 95% confidence is 0.659660 gm/cm2 at the hip and 0.279876 g/cm2 at the lumbar spine. Report dictated by: Liberty Laura PA-c  I have personally reviewed this case and agree with the findings above: Electronically Signed: Shaun Valdez MD  9/9/2024 5:53 PM EDT  Workstation ID: [id number]       Labs:   No results found for: \"CMP\", \"PROTEIN\", \"ANTIMOGAB\", \"NGSONI2BSSO\", \"JCVRESULT\", \"QUANTTBGOLD\", \"CBCDIF\", \"IGGALBSER\"     Assessment / Plan      Assessment/Plan:   Diagnoses and all orders for this visit:    1. Polyneuropathy (Primary)  Comments:  Continue to monitor  Return with worsening symptoms           Patient Education:   The patient's symptoms of numbness and tingling are idiopathic, with no clear cause identified. An MRI of the lower back was suggested to rule out any underlying issues, but she declined, feeling it was not necessary at this time. The potential benefits and drawbacks of the MRI were discussed, including the likelihood of finding only age-related changes. She was advised to maintain her current level of physical activity and to monitor her symptoms. If her symptoms worsen, such as experiencing decreased strength, muscle atrophy, or the return of sciatic pain, an MRI will be reconsidered.    Reviewed medications, potential side effects and signs and symptoms to report. Discussed risk versus benefits of treatment plan with patient and/or family-including medications, labs and radiology that may be ordered. Addressed questions and concerns during visit. Patient and/or family verbalized " understanding and agree with plan. Instructed to call the office with any questions and report to ER with any life-threatening symptoms.     Follow Up:   Return in about 7 months (around 4/10/2025).    I spent 30 minutes caring for Dahiana on this date of service. This time includes time spent by me in the following activities: preparing for the visit, reviewing tests, performing a medically appropriate examination and/or evaluation, counseling and educating the patient/family/caregiver, documenting information in the medical record, and independently interpreting results and communicating that information with the patient/family/caregiver.        During this visit the following were done:  Labs Reviewed []    Labs Ordered []    Radiology Reports Reviewed []    Radiology Ordered []    PCP Records Reviewed []    Referring Provider Records Reviewed []    ER Records Reviewed []    Hospital Records Reviewed []    History Obtained From Family []    Radiology Images Reviewed []    Other Reviewed [x]    Records Requested []      Patient or patient representative verbalized consent for the use of Ambient Listening during the visit with  ROSA ELENA Ramirez for chart documentation. 9/10/2024  16:11 EDT    ROSA ELENA Ramirez   [facility]  [address]  [facility] [place] [id number] [phone]

## 2024-09-11 ENCOUNTER — TELEPHONE (OUTPATIENT)
Dept: INTERNAL MEDICINE | Facility: CLINIC | Age: 74
End: 2024-09-11
Payer: MEDICARE

## 2024-09-11 LAB
ALBUMIN UR-MCNC: <1.2 MG/DL
CREAT UR-MCNC: 9.3 MG/DL
MICROALBUMIN/CREAT UR: NORMAL MG/G{CREAT}

## 2024-09-11 NOTE — TELEPHONE ENCOUNTER
Left voice mail message for Pt to call back    Relay  Please let the patient know that while her cholesterol panel looks to be in normal range, her cardiovascular risk remains high and therefore she meets criteria for utilizing a statin to decrease her cardiovascular risk.  Please let me know if she would be agreeable to starting this prior to her next visit.  Her A1c is still in the prediabetic range at 5.8% and we will continue to follow this in clinic.  Please let me know if she would like to follow with a diabetic educator to decrease her risk of progression diabetes.  Decreasing carbohydrate and sugar intake will be key to minimizing this risk.  The remainder of her labs are within normal limits and we will continue to send them as we receive them.  Thanks.

## 2024-09-11 NOTE — TELEPHONE ENCOUNTER
----- Message from Franco Whittaker sent at 9/11/2024  8:07 AM EDT -----  Please let the patient know that while her cholesterol panel looks to be in normal range, her cardiovascular risk remains high and therefore she meets criteria for utilizing a statin to decrease her cardiovascular risk.  Please let me know if she would be agreeable to starting this prior to her next visit.  Her A1c is still in the prediabetic range at 5.8% and we will continue to follow this in clinic.  Please let me know if she would like to follow with a diabetic educator to decrease her risk of progression diabetes.  Decreasing carbohydrate and sugar intake will be key to minimizing this risk.  The remainder of her labs are within normal limits and we will continue to send them as we receive them.  Thanks.

## 2024-09-11 NOTE — TELEPHONE ENCOUNTER
"Name: Bancroft, Patricia F \"Pat\"      Relationship: Self      Best Callback Number: [phone]       HUB PROVIDED THE RELAY MESSAGE FROM THE OFFICE      PATIENT: VOICED UNDERSTANDING AND HAS NO FURTHER QUESTIONS AT THIS TIME    ADDITIONAL INFORMATION: PATIENT STATES SHE WANTS TO WAIT ON THE STATIN MEDICATION AND SPEAK WITH HER CARDIOLOGIST FIRST.    SHE DOES NOT WANT A DIABETIC EDUCATOR.   "

## 2024-09-16 ENCOUNTER — OFFICE VISIT (OUTPATIENT)
Dept: GYNECOLOGIC ONCOLOGY | Facility: CLINIC | Age: 74
End: 2024-09-16
Payer: MEDICARE

## 2024-09-16 VITALS
TEMPERATURE: 97.3 F | WEIGHT: 142.2 LBS | BODY MASS INDEX: 22.32 KG/M2 | DIASTOLIC BLOOD PRESSURE: 69 MMHG | SYSTOLIC BLOOD PRESSURE: 143 MMHG | OXYGEN SATURATION: 97 % | RESPIRATION RATE: 18 BRPM | HEART RATE: 68 BPM | HEIGHT: 67 IN

## 2024-09-16 DIAGNOSIS — N75.0 BARTHOLIN CYST: Primary | ICD-10-CM

## 2024-09-16 PROCEDURE — 1159F MED LIST DOCD IN RCRD: CPT | Performed by: OBSTETRICS & GYNECOLOGY

## 2024-09-16 PROCEDURE — 56420 I&D BARTHOLINS GLAND ABSCESS: CPT | Performed by: OBSTETRICS & GYNECOLOGY

## 2024-09-16 PROCEDURE — 3077F SYST BP >= 140 MM HG: CPT | Performed by: OBSTETRICS & GYNECOLOGY

## 2024-09-16 PROCEDURE — 1160F RVW MEDS BY RX/DR IN RCRD: CPT | Performed by: OBSTETRICS & GYNECOLOGY

## 2024-09-16 PROCEDURE — 99213 OFFICE O/P EST LOW 20 MIN: CPT | Performed by: OBSTETRICS & GYNECOLOGY

## 2024-09-16 PROCEDURE — 3078F DIAST BP <80 MM HG: CPT | Performed by: OBSTETRICS & GYNECOLOGY

## 2024-09-16 PROCEDURE — 1125F AMNT PAIN NOTED PAIN PRSNT: CPT | Performed by: OBSTETRICS & GYNECOLOGY

## 2024-09-16 RX ORDER — SULFAMETHOXAZOLE/TRIMETHOPRIM 800-160 MG
1 TABLET ORAL 2 TIMES DAILY
Qty: 20 TABLET | Refills: 0 | Status: SHIPPED | OUTPATIENT
Start: 2024-09-16

## 2024-10-07 ENCOUNTER — OFFICE VISIT (OUTPATIENT)
Dept: GYNECOLOGIC ONCOLOGY | Facility: CLINIC | Age: 74
End: 2024-10-07
Payer: MEDICARE

## 2024-10-07 VITALS
HEART RATE: 56 BPM | SYSTOLIC BLOOD PRESSURE: 144 MMHG | OXYGEN SATURATION: 97 % | TEMPERATURE: 97.5 F | RESPIRATION RATE: 17 BRPM | BODY MASS INDEX: 22.44 KG/M2 | DIASTOLIC BLOOD PRESSURE: 77 MMHG | HEIGHT: 67 IN | WEIGHT: 143 LBS

## 2024-10-07 DIAGNOSIS — N75.0 BARTHOLIN CYST: Primary | ICD-10-CM

## 2024-10-07 NOTE — PROGRESS NOTES
"Chief Complaint  No chief complaint on file.    Subjective        Patricia F Bancroft presents to [facility] GYN[facility]IC [facility]COL[facility]  History of Present Illness    Patient was seen September 16, 2024 with noted Bartholin cyst and underwent placement of a Word catheter in the office.  Patient denies any issues at this point and states that overall she is feeling well without any complaints.    Objective   Vital Signs:  /77   Pulse 56   Temp 97.5 °F (36.4 °C) (Temporal)   Resp 17   Ht 170.2 cm (67.01\")   Wt 64.9 kg (143 lb)   SpO2 97%   BMI 22.39 kg/m²   Estimated body mass index is 22.39 kg/m² as calculated from the following:    Height as of this encounter: 170.2 cm (67.01\").    Weight as of this encounter: 64.9 kg (143 lb).    BMI is within normal parameters. No other follow-up for BMI required.      Physical Exam   Bartholin's cyst resolved, Barroso catheter removed without difficulty.  Result Review :                Assessment and Plan    Diagnosis Plan   1. Bartholin cyst          Plan repeat exam in 2 months and if continued palpable abnormality then proceed forward with partial vulvectomy.  On the other hand if it completely resolves and no palpable abnormalities then we will plan continued surveillance.  Precautions explained and all questions answered.  If patient has recurrent Bartholin cysts between now and then she is to call the office and we will set her up for surgical resection.  Patient understands that the typical treatment is surgical resection in somebody her age but due to her travel issues at her last visit the Barroso catheter was placed and appeared to have worked well.         Follow Up   No follow-ups on file.  Patient was given instructions and counseling regarding her condition or for health maintenance advice. Please see specific information pulled into the AVS if appropriate.           "

## 2024-10-16 DIAGNOSIS — I10 ESSENTIAL HYPERTENSION: ICD-10-CM

## 2024-10-16 RX ORDER — METOPROLOL SUCCINATE 25 MG
25 TABLET, EXTENDED RELEASE 24 HR ORAL DAILY
Qty: 90 TABLET | Refills: 1 | Status: SHIPPED | OUTPATIENT
Start: 2024-10-16

## 2024-10-28 ENCOUNTER — TELEPHONE (OUTPATIENT)
Dept: OBSTETRICS AND GYNECOLOGY | Facility: CLINIC | Age: 74
End: 2024-10-28

## 2024-10-28 ENCOUNTER — OFFICE VISIT (OUTPATIENT)
Dept: OBSTETRICS AND GYNECOLOGY | Facility: CLINIC | Age: 74
End: 2024-10-28
Payer: MEDICARE

## 2024-10-28 VITALS
BODY MASS INDEX: 22.76 KG/M2 | SYSTOLIC BLOOD PRESSURE: 120 MMHG | DIASTOLIC BLOOD PRESSURE: 74 MMHG | HEIGHT: 67 IN | WEIGHT: 145 LBS

## 2024-10-28 DIAGNOSIS — Z01.419 ROUTINE GYNECOLOGICAL EXAMINATION: Primary | ICD-10-CM

## 2024-10-28 DIAGNOSIS — N90.3 VULVAR DYSPLASIA: ICD-10-CM

## 2024-10-28 RX ORDER — ESTRADIOL 0.03 MG/D
1 PATCH TRANSDERMAL WEEKLY
Qty: 12 PATCH | Refills: 3 | Status: SHIPPED | OUTPATIENT
Start: 2024-10-28 | End: 2024-10-28

## 2024-10-28 RX ORDER — ESTRADIOL 0.1 MG/G
CREAM VAGINAL
Qty: 1 EACH | Refills: 12 | Status: SHIPPED | OUTPATIENT
Start: 2024-10-28

## 2024-10-28 RX ORDER — ESTRADIOL 0.03 MG/D
1 PATCH TRANSDERMAL WEEKLY
Qty: 12 PATCH | Refills: 3 | Status: SHIPPED | OUTPATIENT
Start: 2024-10-28 | End: 2025-10-21

## 2024-10-28 RX ORDER — ESTRADIOL 0.1 MG/G
CREAM VAGINAL
Qty: 1 EACH | Refills: 12 | Status: SHIPPED | OUTPATIENT
Start: 2024-10-28 | End: 2024-10-28

## 2024-10-28 NOTE — PROGRESS NOTES
"     Gynecologic Annual Exam Note        GYN Annual Exam     CC - Here for annual exam.        HPI  Patricia F Bancroft is a 74 y.o. female, , who presents for annual well woman exam as a new patient.  She is postmenopausal.. Denies vaginal bleeding.   There were no changes to her medical or surgical history since her last visit.     Marital Status: .  She is sexually active. She has had new partners.. STD testing recommendations have been explained to the patient and she declines STD testing.    H/o TLH/BSO/Rectocele repair by Dr. Worley/Colton.    The patient would like to discuss the following complaints today: none. She has a history of vulvar dysplasia and being seen by Dr Sharon padilla.     Additional OB/GYN History   On HRT? Yes. Details: Climara 0.025mg    Has tried going off a couple years ago and couldn't sleep, hotflashes, \"meaner than a snake\".    Last Pap : several years ago. Results: negative. HPV: unknown .   Last Completed Pap Smear            Discontinued - PAP SMEAR  Ordered on 10/28/2024      2016  Done                  History of abnormal Pap smear: no  Family history of uterine, colon, breast, or ovarian cancer: no  Performs monthly Self-Breast Exam: yes  Last mammogram: 3/6/2024. Done at [facility]. There is a copy in the chart. negative    Last Completed Mammogram            MAMMOGRAM (Every 2 Years) Next due on 2024  Mammo Screening Digital Tomosynthesis Bilateral With CAD    2023  Mammo Screening Digital Tomosynthesis Bilateral With CAD    2022  Mammo Screening Digital Tomosynthesis Bilateral With CAD    2021  Mammo Screening Digital Tomosynthesis Bilateral With CAD    2020  Mammo Screening Digital Tomosynthesis Bilateral With CAD    Only the first 5 history entries have been loaded, but more history exists.                  Last colonoscopy: has had a colonoscopy 3 years ago benign polyp RTO 3yrs    Last Completed Colonoscopy    "         COLORECTAL CANCER SCREENING (COLONOSCOPY - Every 5 Years) Next due on 10/27/2026      10/27/2021  SCANNED - COLONOSCOPY    2020  COLONOSCOPY (Declined)    2017  COLONOSCOPY (Declined)                    Her last bone density scan was 7 months ago and results were Osteopenia  Exercises Regularly: yes  Feelings of Anxiety or Depression: no      Tobacco Usage?: No       Current Outpatient Medications:     estradiol (CLIMARA) 0.025 MG/24HR patch, Place 1 patch on the skin as directed by provider 1 (One) Time Per Week for 358 days., Disp: 12 patch, Rfl: 3    aspirin 81 MG chewable tablet, Chew 1 tablet Daily., Disp: , Rfl:     cetirizine (zyrTEC) 10 MG tablet, Take 1 tablet by mouth Daily., Disp: , Rfl:     COVID-19 mRNA Virus Vaccine (COMIRNATY IM), Inject  into the appropriate muscle as directed by prescriber., Disp: , Rfl:     Crestor 40 MG tablet, TAKE ONE TABLET BY MOUTH EVERY DAY, Disp: 90 tablet, Rfl: 3    estradiol (ESTRACE VAGINAL) 0.1 MG/GM vaginal cream, Insert 1 gm intravaginally 1 time each week, Disp: 1 each, Rfl: 12    famotidine (PEPCID) 20 MG tablet, Take 1 tablet by mouth 2 (Two) Times a Day., Disp: 180 tablet, Rfl: 3    fluticasone (FLONASE) 50 MCG/ACT nasal spray, Administer 2 sprays into the nostril(s) as directed by provider Daily., Disp: , Rfl:     Influenza Vac Split High-Dose (FLUZONE HIGH-DOSE IM), Inject  into the appropriate muscle as directed by prescriber. TRIV, Disp: , Rfl:     lisinopril (PRINIVIL,ZESTRIL) 10 MG tablet, TAKE ONE TABLET BY MOUTH EVERY DAY, Disp: 90 tablet, Rfl: 3    Polyethylene Glycol 3350 (MIRALAX PO), Take  by mouth., Disp: , Rfl:     Toprol XL 25 MG 24 hr tablet, TAKE ONE TABLET BY MOUTH EVERY DAY, Disp: 90 tablet, Rfl: 1    Patient is requesting refills of HRT.    OB History          0    Para   0    Term   0       0    AB   0    Living   0         SAB   0    IAB   0    Ectopic   0    Molar   0    Multiple   0    Live Births   0                 Past Medical History:   Diagnosis Date    Allergic     Allergic rhinitis     Seasonal allergies    Coronary artery disease     S/P JAN to RCA, May 2014 EF 65% no residual Left system disease    Dyslipidemia     Dysosmia     Headache, tension-type     Heart murmur     History of tobacco use 12/01/2020    History of vulvar dysplasia 07/10/2024    HL (hearing loss) 1970    Tinnitus    Hx of migraines     Hyperlipidemia     Hypertension     Medicare annual wellness visit, subsequent 01/17/2023    Migraine     Mitral valve prolapse     Osteopenia 1990    Osteopenia improving    Pneumonia 1971    Prediabetes 10/07/2016    Routine gynecological examination 10/28/2024    Urinary tract infection 2016?        Past Surgical History:   Procedure Laterality Date    BREAST BIOPSY Left     BREAST EXCISIONAL BIOPSY Left     BREAST SURGERY      breast biopsy X2    CARDIAC CATHETERIZATION  05/02/2014    Also 04/00/2016    COLONOSCOPY  ~2006    COLONOSCOPY      cologaurd 2017    COLONOSCOPY      march 2009    CORONARY STENT PLACEMENT  05/02/2014    HYSTERECTOMY  03/2004    I & D / EXCISION MARSUPIALIZATION BARTHOLIN'S GLAND CYST / LYSIS LESIONS  09/2024    OOPHORECTOMY      VULVECTOMY  04/12/2021       Health Maintenance   Topic Date Due    MOST FORM  Never done    COVID-19 Vaccine (7 - 2023-24 season) 01/10/2025    ANNUAL WELLNESS VISIT  03/06/2025    HEMOGLOBIN A1C  03/10/2025    DIABETIC EYE EXAM  08/06/2025    LIPID PANEL  09/10/2025    URINE MICROALBUMIN  09/10/2025    MAMMOGRAM  09/09/2026    DXA SCAN  09/09/2026    COLORECTAL CANCER SCREENING  10/27/2026    TDAP/TD VACCINES (3 - Td or Tdap) 09/10/2034    HEPATITIS C SCREENING  Completed    INFLUENZA VACCINE  Completed    Pneumococcal Vaccine 65+  Completed    DIABETIC FOOT EXAM  Discontinued    PAP SMEAR  Discontinued    ZOSTER VACCINE  Discontinued    LUNG CANCER SCREENING  Discontinued       The additional following portions of the patient's history were reviewed and  "updated as appropriate: allergies, current medications, past family history, past medical history, past social history, past surgical history, and problem list.    Review of Systems    I have reviewed and agree with the HPI, ROS, and historical information as entered above. Ruthie Hernandez MD      Objective   /74   Ht 170.2 cm (67\")   Wt 65.8 kg (145 lb)   LMP  (LMP Unknown)   BMI 22.71 kg/m²     Physical Exam  General:  well developed; well nourished  no acute distress  Skin:  No suspicious lesions seen  Thyroid: normal to inspection and palpation  Breasts:  Examined in supine position  Symmetric without masses or skin dimpling  Nipples normal without inversion, lesions or discharge  There are no palpable axillary nodes  CVS: RRR, no M/R/G, distal pulses wnl  Resp: CTAB, No W/R/R  Abdomen: soft, non-tender; no masses  no umbilical or inguinal hernias are present  no hepato-splenomegaly  Pelvis: Clinical staff was present for exam  External genitalia:  normal appearance of the external genitalia including Bartholin's and [place]'s glands.  Urethra: no masses or tenderness  Urethral meatus: normal size;  No lesions or signs of prolapse  Bladder: non tender to palpation, no masses, no prolapse  Vagina:  atrophic mucosa without prolapse or lesions.  Adnexa:  normal bimanual exam of the adnexa.  Perineum/Anus: normal appearance, no external hemorrhoids  Ext: 2+ pulses, no edema      Assessment and Plan    Problem List Items Addressed This Visit       Vulvar dysplasia    Overview     Referred by Dr. Fabi Simmons    3/11/2021: Vulvar biopsy with GABINO 3 with positive deep and peripheral margins.  The possibility of invasion cannot be excluded.  4/12/2021: Vulvectomy with HSIL. Negative for cancer. All margins free of HSIL.          Routine gynecological examination - Primary    Relevant Orders    LIQUID-BASED PAP SMEAR WITH HPV GENOTYPING IF ASCUS ([facility])       GYN annual well woman exam.   Reviewed " risks/benefits of HRT.  Patient voices understanding and very emphatically wants to keep going with HRT.  Encouraged Dexa with PCP  Return in about 1 year (around 10/28/2025) for Appropriate for followup with NP as desired..         Ruthie Hernandez MD  10/28/2024

## 2024-10-28 NOTE — TELEPHONE ENCOUNTER
PT CALLED AND STATED THAT SHE WAS SEEN TODAY AND HER TWO NEW SCRIPTS WERE SUPPOSED TO BE SENT TO [facility] BY MAIL BUT THEY WERE SENT TO [facility] ON [address] [facility] [address]. PT WOULD LIKE TO KNOW IF THIS WAS ON PURPOSE OR IF WE COULD GET THAT FIXED

## 2024-12-04 ENCOUNTER — OFFICE VISIT (OUTPATIENT)
Dept: GYNECOLOGIC ONCOLOGY | Facility: CLINIC | Age: 74
End: 2024-12-04
Payer: MEDICARE

## 2024-12-04 VITALS
DIASTOLIC BLOOD PRESSURE: 67 MMHG | TEMPERATURE: 98 F | RESPIRATION RATE: 17 BRPM | HEIGHT: 67 IN | HEART RATE: 75 BPM | SYSTOLIC BLOOD PRESSURE: 120 MMHG | BODY MASS INDEX: 22.57 KG/M2 | OXYGEN SATURATION: 99 % | WEIGHT: 143.8 LBS

## 2024-12-04 DIAGNOSIS — N90.89 VULVAR MASS: ICD-10-CM

## 2024-12-04 DIAGNOSIS — D07.1 VIN III (VULVAR INTRAEPITHELIAL NEOPLASIA III): Primary | ICD-10-CM

## 2024-12-04 NOTE — PROGRESS NOTES
"  Chief Complaint  Follow up/vulvar mass    Subjective        Patricia F Bancroft presents to [facility] GYNECOLOGIC ONCOLOGY  History of Present Illness    Patient is a history of GABINO 3 with recent presentation for Bartholin cyst that was drained instead of removed secondary to the patient's travel plans at that time.  Patient returns today without any new complaints or problems.    Objective   Vital Signs:  /67   Pulse 75   Temp 98 °F (36.7 °C) (Temporal)   Resp 17   Ht 170.2 cm (67.01\")   Wt 65.2 kg (143 lb 12.8 oz)   SpO2 99%   BMI 22.52 kg/m²   Estimated body mass index is 22.52 kg/m² as calculated from the following:    Height as of this encounter: 170.2 cm (67.01\").    Weight as of this encounter: 65.2 kg (143 lb 12.8 oz).    BMI is within normal parameters. No other follow-up for BMI required.      Physical Exam  Constitutional:       General: She is not in acute distress.     Appearance: Normal appearance.   Genitourinary:     Comments: NO PALPABLE OR VISIBLE MASS         Result Review :                Assessment and Plan   Diagnoses and all orders for this visit:    1. GABINO III (vulvar intraepithelial neoplasia III) (Primary)    2. Vulvar mass    Resolved.  Routine follow-up.  Precautions explained to return immediately if she develops any evidence of Bartholin cyst or vulvar mass.  Patient to maintain continue follow-up with primary GYN.         Follow Up   No follow-ups on file.  Patient was given instructions and counseling regarding her condition or for health maintenance advice. Please see specific information pulled into the AVS if appropriate.               "

## 2025-02-04 DIAGNOSIS — K21.9 GASTROESOPHAGEAL REFLUX DISEASE, UNSPECIFIED WHETHER ESOPHAGITIS PRESENT: ICD-10-CM

## 2025-02-04 RX ORDER — FAMOTIDINE 20 MG/1
20 TABLET, FILM COATED ORAL 2 TIMES DAILY
Qty: 180 TABLET | Refills: 3 | Status: SHIPPED | OUTPATIENT
Start: 2025-02-04

## 2025-02-11 ENCOUNTER — TELEPHONE (OUTPATIENT)
Dept: CARDIOLOGY | Facility: CLINIC | Age: 75
End: 2025-02-11

## 2025-02-11 NOTE — TELEPHONE ENCOUNTER
"  Caller: Bancroft, Patricia F \"Pat\"    Relationship to patient: Self    Best call back number: [phone]     Type of visit: F/U APPT    Requested date: NEXT AVAILABLE     If rescheduling, when is the original appointment: 2-12-25     Additional notes:PLEASE CONTACT PATIENT WITH A SUITABLE DATE.         "

## 2025-03-11 ENCOUNTER — RESULTS FOLLOW-UP (OUTPATIENT)
Dept: INTERNAL MEDICINE | Facility: CLINIC | Age: 75
End: 2025-03-11

## 2025-03-11 ENCOUNTER — OFFICE VISIT (OUTPATIENT)
Dept: INTERNAL MEDICINE | Facility: CLINIC | Age: 75
End: 2025-03-11
Payer: MEDICARE

## 2025-03-11 VITALS
DIASTOLIC BLOOD PRESSURE: 78 MMHG | HEIGHT: 66 IN | WEIGHT: 143.8 LBS | HEART RATE: 74 BPM | SYSTOLIC BLOOD PRESSURE: 136 MMHG | BODY MASS INDEX: 23.11 KG/M2 | RESPIRATION RATE: 18 BRPM | TEMPERATURE: 97.1 F

## 2025-03-11 DIAGNOSIS — Z23 ENCOUNTER FOR VACCINATION: ICD-10-CM

## 2025-03-11 DIAGNOSIS — I25.10 CORONARY ARTERY DISEASE INVOLVING NATIVE CORONARY ARTERY OF NATIVE HEART WITHOUT ANGINA PECTORIS: ICD-10-CM

## 2025-03-11 DIAGNOSIS — E78.5 HYPERLIPIDEMIA LDL GOAL <70: ICD-10-CM

## 2025-03-11 DIAGNOSIS — M85.89 OSTEOPENIA OF MULTIPLE SITES: ICD-10-CM

## 2025-03-11 DIAGNOSIS — R73.03 PREDIABETES: ICD-10-CM

## 2025-03-11 DIAGNOSIS — M51.360 DEGENERATION OF INTERVERTEBRAL DISC OF LUMBAR REGION WITH DISCOGENIC BACK PAIN: ICD-10-CM

## 2025-03-11 DIAGNOSIS — G62.9 POLYNEUROPATHY: ICD-10-CM

## 2025-03-11 DIAGNOSIS — Z00.00 MEDICARE ANNUAL WELLNESS VISIT, SUBSEQUENT: Primary | ICD-10-CM

## 2025-03-11 DIAGNOSIS — E04.1 NON-TOXIC UNINODULAR GOITER: ICD-10-CM

## 2025-03-11 DIAGNOSIS — J32.0 CHRONIC MAXILLARY SINUSITIS: ICD-10-CM

## 2025-03-11 DIAGNOSIS — Z91.89 AT INCREASED RISK FOR EXPOSURE TO MEASLES VIRUS: ICD-10-CM

## 2025-03-11 DIAGNOSIS — Z12.31 ENCOUNTER FOR SCREENING MAMMOGRAM FOR MALIGNANT NEOPLASM OF BREAST: ICD-10-CM

## 2025-03-11 PROBLEM — E11.8 DIABETIC FOOT: Status: RESOLVED | Noted: 2024-03-06 | Resolved: 2025-03-11

## 2025-03-11 LAB
ALBUMIN SERPL-MCNC: 4.3 G/DL (ref 3.5–5.2)
ALBUMIN UR-MCNC: <1.2 MG/DL
ALBUMIN/GLOB SERPL: 1.4 G/DL
ALP SERPL-CCNC: 49 U/L (ref 39–117)
ALT SERPL W P-5'-P-CCNC: 18 U/L (ref 1–33)
ANION GAP SERPL CALCULATED.3IONS-SCNC: 11 MMOL/L (ref 5–15)
AST SERPL-CCNC: 27 U/L (ref 1–32)
BILIRUB SERPL-MCNC: 0.6 MG/DL (ref 0–1.2)
BUN SERPL-MCNC: 14 MG/DL (ref 8–23)
BUN/CREAT SERPL: 18.7 (ref 7–25)
CALCIUM SPEC-SCNC: 9.3 MG/DL (ref 8.6–10.5)
CHLORIDE SERPL-SCNC: 103 MMOL/L (ref 98–107)
CHOLEST SERPL-MCNC: 192 MG/DL (ref 0–200)
CO2 SERPL-SCNC: 25 MMOL/L (ref 22–29)
CREAT SERPL-MCNC: 0.75 MG/DL (ref 0.57–1)
CREAT UR-MCNC: 40.7 MG/DL
EGFRCR SERPLBLD CKD-EPI 2021: 83.7 ML/MIN/1.73
EXPIRATION DATE: NORMAL
GLOBULIN UR ELPH-MCNC: 3.1 GM/DL
GLUCOSE SERPL-MCNC: 82 MG/DL (ref 65–99)
HBA1C MFR BLD: 5.6 % (ref 4.5–5.7)
HDLC SERPL-MCNC: 78 MG/DL (ref 40–60)
LDLC SERPL CALC-MCNC: 98 MG/DL (ref 0–100)
LDLC/HDLC SERPL: 1.24 {RATIO}
Lab: NORMAL
MICROALBUMIN/CREAT UR: NORMAL MG/G{CREAT}
POTASSIUM SERPL-SCNC: 4.3 MMOL/L (ref 3.5–5.2)
PROT SERPL-MCNC: 7.4 G/DL (ref 6–8.5)
SODIUM SERPL-SCNC: 139 MMOL/L (ref 136–145)
TRIGL SERPL-MCNC: 88 MG/DL (ref 0–150)
VLDLC SERPL-MCNC: 16 MG/DL (ref 5–40)

## 2025-03-11 PROCEDURE — 80053 COMPREHEN METABOLIC PANEL: CPT | Performed by: STUDENT IN AN ORGANIZED HEALTH CARE EDUCATION/TRAINING PROGRAM

## 2025-03-11 PROCEDURE — 80061 LIPID PANEL: CPT | Performed by: STUDENT IN AN ORGANIZED HEALTH CARE EDUCATION/TRAINING PROGRAM

## 2025-03-11 PROCEDURE — 86735 MUMPS ANTIBODY: CPT | Performed by: STUDENT IN AN ORGANIZED HEALTH CARE EDUCATION/TRAINING PROGRAM

## 2025-03-11 PROCEDURE — 82043 UR ALBUMIN QUANTITATIVE: CPT | Performed by: STUDENT IN AN ORGANIZED HEALTH CARE EDUCATION/TRAINING PROGRAM

## 2025-03-11 PROCEDURE — 82570 ASSAY OF URINE CREATININE: CPT | Performed by: STUDENT IN AN ORGANIZED HEALTH CARE EDUCATION/TRAINING PROGRAM

## 2025-03-11 PROCEDURE — 86762 RUBELLA ANTIBODY: CPT | Performed by: STUDENT IN AN ORGANIZED HEALTH CARE EDUCATION/TRAINING PROGRAM

## 2025-03-11 PROCEDURE — 86765 RUBEOLA ANTIBODY: CPT | Performed by: STUDENT IN AN ORGANIZED HEALTH CARE EDUCATION/TRAINING PROGRAM

## 2025-03-11 RX ORDER — LEVOCETIRIZINE DIHYDROCHLORIDE 5 MG/1
5 TABLET, FILM COATED ORAL EVERY EVENING
Qty: 90 TABLET | Refills: 3 | Status: SHIPPED | OUTPATIENT
Start: 2025-03-11

## 2025-03-11 NOTE — PROGRESS NOTES
Subjective   The ABCs of the Annual Wellness Visit  Medicare Wellness Visit      Patricia F Bancroft is a 74 y.o. patient who presents for a Medicare Wellness Visit.    The following portions of the patient's history were reviewed and   updated as appropriate: allergies, current medications, past family history, past medical history, past social history, past surgical history, and problem list.    Compared to one year ago, the patient's physical   health is the same.  Compared to one year ago, the patient's mental   health is the same.    Recent Hospitalizations:  She was not admitted to the hospital during the last year.     Current Medical Providers:  Patient Care Team:  Franco Whittaker MD as PCP - General (Family Medicine)  Nitish Carmona MD as Consulting Physician (Colon and Rectal Surgery)  Dennis Blackwood DPM as Consulting Physician (Podiatry)  Ghanshyam Padron III, MD as Cardiologist (Cardiology)  Ole Mcmanus DPM as Consulting Physician (Podiatry)  Niyah Park MD as Consulting Physician (Gynecology)    Outpatient Medications Prior to Visit   Medication Sig Dispense Refill    aspirin 81 MG chewable tablet Chew 1 tablet Daily.      Crestor 40 MG tablet TAKE ONE TABLET BY MOUTH EVERY DAY 90 tablet 3    estradiol (CLIMARA) 0.025 MG/24HR patch Place 1 patch on the skin as directed by provider 1 (One) Time Per Week for 358 days. 12 patch 3    estradiol (ESTRACE VAGINAL) 0.1 MG/GM vaginal cream Insert 1 gm intravaginally 1 time each week 1 each 12    famotidine (PEPCID) 20 MG tablet TAKE ONE TABLET BY MOUTH TWICE A  tablet 3    fluticasone (FLONASE) 50 MCG/ACT nasal spray Administer 2 sprays into the nostril(s) as directed by provider Daily.      lisinopril (PRINIVIL,ZESTRIL) 10 MG tablet TAKE ONE TABLET BY MOUTH EVERY DAY 90 tablet 3    Polyethylene Glycol 3350 (MIRALAX PO) Take  by mouth.      Toprol XL 25 MG 24 hr tablet TAKE ONE TABLET BY MOUTH EVERY DAY 90 tablet 1    cetirizine  (zyrTEC) 10 MG tablet Take 1 tablet by mouth Daily.      COVID-19 mRNA Virus Vaccine (COMIRNATY IM) Inject  into the appropriate muscle as directed by prescriber.      Influenza Vac Split High-Dose (FLUZONE HIGH-DOSE IM) Inject  into the appropriate muscle as directed by prescriber. TRIV       No facility-administered medications prior to visit.     No opioid medication identified on active medication list. I have reviewed chart for other potential  high risk medication/s and harmful drug interactions in the elderly.      Aspirin is on active medication list. Aspirin use is indicated based on review of current medical condition/s. Pros and cons of this therapy have been discussed today. Benefits of this medication outweigh potential harm.  Patient has been encouraged to continue taking this medication.  .      Patient Active Problem List   Diagnosis    Coronary artery disease involving native coronary artery of native heart without angina pectoris    Essential hypertension    Hyperlipidemia LDL goal <70    Prediabetes    Allergic rhinitis    Chronic idiopathic constipation    Gastroesophageal reflux disease    Allergic rhinitis due to animal dander    Combined form of senile cataract    Myopia    Non-toxic uninodular goiter    Posterior vitreous detachment    Presbyopia    Regular astigmatism    Vulvar dysplasia    Post menopausal syndrome    Herpes simplex vulvovaginitis    Pulmonary calcification determined by X-ray    Polyneuropathy    Osteopenia of multiple sites    TMJ (temporomandibular joint disorder)    Routine gynecological examination    Degeneration of intervertebral disc of lumbar region with discogenic back pain     Advance Care Planning Advance Directive is not on file.  ACP discussion was held with the patient during this visit. Patient does not have an advance directive, information provided.            Objective   Vitals:    03/11/25 0946   BP: 136/78   BP Location: Right arm   Patient Position:  "Sitting   Cuff Size: Adult   Pulse: 74   Resp: 18   Temp: 97.1 °F (36.2 °C)   TempSrc: Temporal   Weight: 65.2 kg (143 lb 12.8 oz)   Height: 167.6 cm (66\")   PainSc: 0-No pain       Estimated body mass index is 23.21 kg/m² as calculated from the following:    Height as of this encounter: 167.6 cm (66\").    Weight as of this encounter: 65.2 kg (143 lb 12.8 oz).    BMI is within normal parameters. No other follow-up for BMI required.    Gait and Balance Evaluation:  Normal         Does the patient have evidence of cognitive impairment? No  Lab Results   Component Value Date    HGBA1C 5.6 03/11/2025                                                                                                Health  Risk Assessment    Smoking Status:  Social History     Tobacco Use   Smoking Status Former    Current packs/day: 0.00    Average packs/day: 1.5 packs/day for 43.6 years (65.4 ttl pk-yrs)    Types: Cigarettes    Start date: 1/1/1966    Quit date: 8/14/2009    Years since quitting: 15.5    Passive exposure: Past   Smokeless Tobacco Never     Alcohol Consumption:  Social History     Substance and Sexual Activity   Alcohol Use Not Currently    Comment: Quit 1994       Fall Risk Screen  [facility] Fall Risk Assessment was completed, and patient is at LOW risk for falls.Assessment completed on:3/11/2025    Depression Screening   Little interest or pleasure in doing things? Not at all   Feeling down, depressed, or hopeless? Not at all   PHQ-2 Total Score 0      Health Habits and Functional and Cognitive Screening:      3/4/2025     2:52 PM   Functional & Cognitive Status   Do you have difficulty preparing food and eating? No   Do you have difficulty bathing yourself, getting dressed or grooming yourself? No   Do you have difficulty using the toilet? No   Do you have difficulty moving around from place to place? No   Do you have trouble with steps or getting out of a bed or a chair? No   Current Diet Well Balanced Diet   Dental Exam " Up to date   Eye Exam Up to date   Exercise (times per week) 4 times per week   Current Exercises Include Aerobics;Cardiovascular Workout;Dancing;House Cleaning;Treadmill;Weightlifting   Do you need help using the phone?  No   Are you deaf or do you have serious difficulty hearing?  No   Do you need help to go to places out of walking distance? No   Do you need help shopping? No   Do you need help preparing meals?  No   Do you need help with housework?  No   Do you need help with laundry? No   Do you need help taking your medications? No   Do you need help managing money? No   Do you ever drive or ride in a car without wearing a seat belt? No   Have you felt unusual stress, anger or loneliness in the last month? No   Who do you live with? Alone   If you need help, do you have trouble finding someone available to you? No   Have you been bothered in the last four weeks by sexual problems? No   Do you have difficulty concentrating, remembering or making decisions? No           Age-appropriate Screening Schedule:  Refer to the list below for future screening recommendations based on patient's age, sex and/or medical conditions. Orders for these recommended tests are listed in the plan section. The patient has been provided with a written plan.    Health Maintenance List  Health Maintenance   Topic Date Due    URINE MICROALBUMIN-CREATININE RATIO (uACR)  Never done    DIABETIC EYE EXAM  08/06/2025    LIPID PANEL  09/10/2025    HEMOGLOBIN A1C  09/11/2025    ANNUAL WELLNESS VISIT  03/11/2026    MAMMOGRAM  09/09/2026    DXA SCAN  09/09/2026    COLORECTAL CANCER SCREENING  10/27/2026    TDAP/TD VACCINES (3 - Td or Tdap) 09/10/2034    HEPATITIS C SCREENING  Completed    COVID-19 Vaccine  Completed    INFLUENZA VACCINE  Completed    Pneumococcal Vaccine 50+  Completed    DIABETIC FOOT EXAM  Discontinued    PAP SMEAR  Discontinued    ZOSTER VACCINE  Discontinued    LUNG CANCER SCREENING  Discontinued                                                                                                                                                 CMS Preventative Services Quick Reference  Risk Factors Identified During Encounter  Dental Screening Recommended  Vision Screening Recommended    The above risks/problems have been discussed with the patient.  Pertinent information has been shared with the patient in the After Visit Summary.  An After Visit Summary and PPPS were made available to the patient.    Follow Up:   Next Medicare Wellness visit to be scheduled in 1 year.         Additional E&M Note during same encounter follows:  Patient has additional, significant, and separately identifiable condition(s)/problem(s) that require work above and beyond the Medicare Wellness Visit     Chief Complaint  Medicare Wellness-subsequent    Subjective   HPI  Pat is also being seen today for additional medical problem/s.    Review of Systems   All other systems reviewed and are negative.       History of Present Illness  The patient is a 74-year-old female who presents for a Medicare wellness visit and chronic care management.    Vaccines  She has expressed interest in receiving her second COVID-19 vaccine and is seeking advice on the necessity of a measles vaccine, given her history of dio both Citizen of [place] and red measles during her childhood. She has an upcoming appointment with Dr. Padron on Monday.    Persistent Superficial Numbness  She has been experiencing persistent superficial numbness, which has not shown any signs of improvement. She has a scheduled appointment with the neurology department and is uncertain about the need for an MRI or if she should adapt to living with this condition. She reports no associated pain but occasionally experiences foot discomfort. She suspects a possible undiagnosed pinched nerve in her spine. She does not experience any residual back pain but recalls a past episode of sciatica. An MRI conducted at that time  revealed a disc bulge, which was not severe enough to warrant surgical intervention. Physical therapy was recommended, which she completed, and her symptoms resolved. She is curious if her current symptoms are related to her previous spinal issues or if they are indicative of a new problem.  - Onset: Persistent, no signs of improvement.  - Location: Superficial numbness, occasional foot discomfort.  - Character: Persistent numbness, no associated pain.  - Timing: Scheduled appointment with neurology department.  - Severity: No residual back pain, past sciatica episode resolved with physical therapy.    Sinus Pain  She has been experiencing sinus pain, which extends to her hairline upon touch. This pain is primarily located around her maxillary sinus on both sides and has been persistent for an extended period. She also reports feeling congestion behind her eyeball and drainage when she rolls her eyes upon waking. She has been managing her symptoms with generic Zyrtec and Claritin, alternating between the two every six months. She also has Flonase, which she has not used during the winter season.  - Onset: Persistent for an extended period.  - Location: Maxillary sinus on both sides, extends to hairline.  - Character: Sinus pain, congestion behind eyeball, drainage upon waking.  - Alleviating Factors: Generic Zyrtec and Claritin, alternating every six months; Flonase (not used during winter season).    Potential Elevated Blood Sugar Levels  She believes her blood sugar levels may be elevated due to increased ice cream consumption. She recalls undergoing a glucose tolerance test at the age of 16 due to a family history of diabetes, which revealed a low renal threshold for sugar, indicating the presence of sugar in her urine but not in her blood.  - Onset: Recent increase in ice cream consumption.  - Character: Belief of elevated blood sugar levels.  - History: Glucose tolerance test at age 16, low renal threshold for  sugar.    Sore Spot Along Sternum  She has noticed a sore spot along her sternum, which she believes may be muscular in nature.  - Location: Along sternum.  - Character: Sore spot, possibly muscular in nature.    General Health and Follow-ups  She has not had her thyroid nodules checked recently. She reports feeling physically and mentally the same as last year. She has been reviewing all her paperwork recently. She has regular appointments with an eye doctor, dentist, and dermatologist. She reports no feelings of unsafety or being a victim of abuse.    SOCIAL HISTORY  The patient stopped smoking a long time ago.    FAMILY HISTORY  The patient reports a family history of diabetes.    MEDICATIONS  - Current: Crestor, estradiol patch, Zyrtec, Claritin, Flonase    Immunizations:  Immunization History   Administered Date(s) Administered    ABRYSVO (RSV, 60+ or pregnant women 32-36 wks) 09/10/2024    COVID-19 (PFIZER) 12YRS+ (COMIRNATY) 09/10/2024, 03/11/2025    COVID-19 (PFIZER) BIVALENT 12+YRS 10/04/2022    COVID-19 (PFIZER) Purple Cap Monovalent 01/30/2021, 02/24/2021, 09/29/2021, 03/31/2022    FLUAD TRI 65YR+ 10/01/2019    Fluad Quad 65+ 09/26/2020    Fluzone High-Dose 65+YRS 10/03/2016, 10/03/2017, 10/01/2018, 09/10/2024    Fluzone High-Dose 65+yrs 10/09/2021, 10/04/2022, 10/02/2023    Pneumococcal Conjugate 13-Valent (PCV13) 09/24/2015    Pneumococcal Polysaccharide (PPSV23) 10/17/2016    Tdap 09/10/2024       Colorectal Screening:   Up-To-Date   Last Completed Colonoscopy            Upcoming       COLORECTAL CANCER SCREENING (COLONOSCOPY - Every 5 Years) Next due on 10/27/2026      10/27/2021  SCANNED - COLONOSCOPY    12/01/2020  COLONOSCOPY (Declined)    04/17/2017  COLONOSCOPY (Declined)                          Pap:  Up-To-Date   Last Completed Pap Smear            Completed or No Longer Recommended       PAP SMEAR  Discontinued      10/28/2024  Order placed for LIQUID-BASED PAP SMEAR WITH HPV GENOTYPING IF  ASCUS (JHOAN,COR,MAD) by Yi Valentine MA    01/01/2016  Done                           Mammogram:  ordered  Last Completed Mammogram            Awaiting Completion       MAMMOGRAM (Every 2 Years) Order placed this encounter      03/11/2025  Order placed for Mammo Screening Digital Tomosynthesis Bilateral With CAD by Franco Whittaker MD    09/09/2024  Mammo Screening Digital Tomosynthesis Bilateral With CAD    08/31/2023  Mammo Screening Digital Tomosynthesis Bilateral With CAD    08/29/2022  Mammo Screening Digital Tomosynthesis Bilateral With CAD    08/26/2021  Mammo Screening Digital Tomosynthesis Bilateral With CAD     Only the first 5 history entries have been loaded, but more history exists.                           CT for Smoker (Age 50-80, 20 pk yr):  N/A  Bone Density/DEXA (Age 65 or high risk): DEXA scan completed  Hep C (Age 18-79 once):  previously negative  HIV (Age 15-65 once): previously negative  A1c: ordered  Lipid panel: ordered    Dermatology: established  Ophthalmologist: established  Dentist: established    Tobacco Use: Medium Risk (3/11/2025)    Patient History     Smoking Tobacco Use: Former     Smokeless Tobacco Use: Never     Passive Exposure: Past       Social History     Substance and Sexual Activity   Alcohol Use Not Currently    Comment: Quit 1994        Social History     Substance and Sexual Activity   Drug Use No    Types: Marijuana    Comment: as a teenager        Diet/Physical activity: counseled on 03/11/25      PHQ-2 Depression Screening  Little interest or pleasure in doing things? Not at all   Feeling down, depressed, or hopeless? Not at all   PHQ-2 Total Score 0         Intimate partner violence: (Screen on initial visit, pregnant women, women with injuries, older adult with injury or evidence of neglect): no concerns  Violence can be a problem in many people's lives, so I now ask every patient about trauma or abuse they may have experienced in a relationship.  Stress/Safety  "- Do you feel safe in your relationship?  Afraid/Abused - Have you ever been in a relationship where you were threatened, hurt, or afraid?  Friend/Family - Are your friends aware you have been hurt?  Emergency Plan - Do you have a safe place to go and the resources you need in an emergency?    Osteoporosis: osteopenia, following in clinic  Ost menopausal women < 65 with RF (advancing age, previous fracture, glucocorticoid therapy, parental hip fracture, low body weight, current cigarette smoking, excessive alcohol consumption, rheumatoid arthritis, secondary osteoporosis [hypogonadism/premature menopause, malabsorption, chronic liver disease, IBD]).  All women 65 or older        Objective   Vital Signs:  /78 (BP Location: Right arm, Patient Position: Sitting, Cuff Size: Adult)   Pulse 74   Temp 97.1 °F (36.2 °C) (Temporal)   Resp 18   Ht 167.6 cm (66\")   Wt 65.2 kg (143 lb 12.8 oz)   BMI 23.21 kg/m²   Physical Exam  Vitals and nursing note reviewed.   Constitutional:       General: She is not in acute distress.     Appearance: Normal appearance. She is normal weight. She is not ill-appearing or toxic-appearing.   HENT:      Nose: No congestion or rhinorrhea.   Eyes:      General:         Right eye: No discharge.         Left eye: No discharge.      Conjunctiva/sclera: Conjunctivae normal.   Cardiovascular:      Rate and Rhythm: Normal rate and regular rhythm.      Heart sounds: Normal heart sounds. No murmur heard.     No friction rub.   Pulmonary:      Effort: Pulmonary effort is normal. No respiratory distress.      Breath sounds: Normal breath sounds. No wheezing or rhonchi.   Abdominal:      General: Abdomen is flat. Bowel sounds are normal. There is no distension.      Palpations: Abdomen is soft. There is no mass.      Tenderness: There is no abdominal tenderness. There is no guarding or rebound.   Musculoskeletal:      Cervical back: Normal range of motion.      Right lower leg: No edema.      " Left lower leg: No edema.   Skin:     Findings: No lesion or rash.   Neurological:      General: No focal deficit present.      Mental Status: She is alert. Mental status is at baseline.      Coordination: Coordination normal.      Gait: Gait normal.   Psychiatric:         Mood and Affect: Mood normal.         Behavior: Behavior normal.         Thought Content: Thought content normal.         Judgment: Judgment normal.         The following data was reviewed by: Franco Whittaker MD on 03/11/2025:  Results        Common labs          6/18/2024    09:56 9/10/2024    09:03 3/11/2025    11:04   Common Labs   Albumin 3.7      Total Cholesterol  189     Triglycerides  95     HDL Cholesterol  80     LDL Cholesterol   92     Hemoglobin A1C  5.80  5.6    Microalbumin, Urine  <1.2            Assessment and Plan Additional age appropriate preventative wellness advice topics were discussed during today's preventative wellness exam(some topics already addressed during AWV portion of the note above):    Physical Activity: Advised cardiovascular activity 150 minutes per week as tolerated. (example brisk walk for 30 minutes, 5 days a week).     Nutrition: Discussed nutrition plan with patient. Information shared in after visit summary. Goal is for a well balanced diet to enhance overall health.     Healthy Weight: Discussed current and goal BMI with patient. Steps to attain this goal discussed. Information shared in after visit summary.    Problem List Items Addressed This Visit       Coronary artery disease involving native coronary artery of native heart without angina pectoris          Overview    S/P JAN to RCA, May 2014 EF 65% no residual Left system disease        Relevant Orders    Lipid Panel    Hyperlipidemia LDL goal <70    Relevant Orders    Lipid Panel    Prediabetes    Relevant Orders    POC Glycosylated Hemoglobin (Hb A1C) (Completed)    Microalbumin / Creatinine Urine Ratio - Urine, Clean Catch    Comprehensive  Metabolic Panel    Non-toxic uninodular goiter    Polyneuropathy    Overview   Neurology EMG 8/20/24:  distal symmetrical 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         Relevant Orders    MRI Lumbar Spine With & Without Contrast    Osteopenia of multiple sites    Overview   Low fracture risk with DEXA scan on 9/9/2024  -Conservative management indicated including vitamin D supplementation and weightbearing exercises, plan on rescanning in 2026         Degeneration of intervertebral disc of lumbar region with discogenic back pain    Relevant Orders    MRI Lumbar Spine With & Without Contrast     Other Visit Diagnoses         Medicare annual wellness visit, subsequent    -  Primary    Relevant Orders    Code Status and Medical Interventions:      At increased risk for exposure to measles virus        Relevant Orders    Measles / Mumps / Rubella Immunity      Encounter for vaccination        Relevant Orders    COVID-19 (Pfizer) 12yrs+ (COMIRNATY) (Completed)      Chronic maxillary sinusitis        Relevant Medications    levocetirizine (XYZAL) 5 MG tablet      Encounter for screening mammogram for malignant neoplasm of breast        Relevant Orders    Mammo Screening Digital Tomosynthesis Bilateral With CAD          Assessment & Plan  1. Medicare wellness visit:  - Bone density test in 2024 indicated osteopenia with low risk; no additional medications needed  - Using estradiol patch for extra bone protection  - Up to date with mammogram screenings; next due in September 2025  - No bone density test needed until 2026  - No colon cancer screening needed until 2026  - Does not meet criteria for additional lung cancer screenings  - CT scan in 2023 for lung opacity; rescreening showed normal results  - Ceased smoking over 15 years ago; no longer meets criteria for lung cancer screening  - Order for mammogram placed for September 2025  - Will receive COVID-19 vaccine today  -  Immunity panel to determine need for measles booster  - Cholesterol panel, A1c, and microalbumin levels to be rechecked for prediabetes  - Will be informed of cholesterol, A1c, and urine microalbumin results  - Will receive calls for future screenings such as mammograms    2. Sensory motor polyneuropathy with mixed demyelination:  - Upcoming neurology appointment in April 2025  - MRI of lumbar spine to assess potential association with numbness; MRI ordered    3. Sinusitis:  - Continue taking Zyrtec at night and Flonase in the morning (2 sprays per nostril)  - Inform if symptoms persist for treatment adjustment  - Prescription for Xyzal sent to [facility] for a 90-day supply    4. Prediabetes:  - A1c and microalbumin levels to be rechecked to ensure normal range    5. Thyroid nodules:  - Ultrasound ordered to determine if nodules need biopsy via fine needle aspiration    6. Muscle strain:  - Apply ice or lidocaine patch to affected area    Follow-up:  - Scheduled for follow-up visit in 6 months, or sooner if concerns arise from lab results or symptoms worsen    Healthcare Maintenance:  Counseling provided based on age appropriate USPSTF guidelines.  BMI is within normal parameters. No other follow-up for BMI required.    Patricia F Bancroft voices understanding and acceptance of this advice and will call back with any further questions or concerns. AVS with preventive healthcare tips printed for patient.     “Discussed risks/benefits to vaccination, reviewed components of the vaccine, discussed VIS, discussed informed consent, informed consent obtained. Patient/Parent was allowed to accept or refuse vaccine. Questions answered to satisfactory state of patient/Parent. We reviewed typical age appropriate and seasonally appropriate vaccinations. Reviewed immunization history and updated state vaccination form as needed. Patient was counseled on COVID-19  Influenza    Follow Up:   Return in about 6 months (around  9/11/2025) for Recheck.      Franco Whittaker MD  [facility]      Patient was given instructions and counseling regarding her condition or for health maintenance advice. Please see specific information pulled into the AVS if appropriate.

## 2025-03-11 NOTE — PATIENT INSTRUCTIONS
Health Maintenance for Postmenopausal Women  Menopause is a normal process in which your reproductive ability comes to an end. This process happens gradually over a span of months to years, usually between the ages of 48 and 55. Menopause is complete when you have missed 12 consecutive menstrual periods.  It is important to talk with your health care provider about some of the most common conditions that affect postmenopausal women, such as heart disease, cancer, and bone loss (osteoporosis). Adopting a healthy lifestyle and getting preventive care can help to promote your health and wellness. Those actions can also lower your chances of developing some of these common conditions.  What should I know about menopause?  During menopause, you may experience a number of symptoms, such as:  Moderate-to-severe hot flashes.  Night sweats.  Decrease in sex drive.  Mood swings.  Headaches.  Tiredness.  Irritability.  Memory problems.  Insomnia.     Choosing to treat or not to treat menopausal changes is an individual decision that you make with your health care provider.  What should I know about hormone replacement therapy and supplements?  Hormone therapy products are effective for treating symptoms that are associated with menopause, such as hot flashes and night sweats. Hormone replacement carries certain risks, especially as you become older. If you are thinking about using estrogen or estrogen with progestin treatments, discuss the benefits and risks with your health care provider.  What should I know about heart disease and stroke?  Heart disease, heart attack, and stroke become more likely as you age. This may be due, in part, to the hormonal changes that your body experiences during menopause. These can affect how your body processes dietary fats, triglycerides, and cholesterol. Heart attack and stroke are both medical emergencies.    There are many things that you can do to help prevent heart disease and  stroke:  Have your blood pressure checked at least every 1-2 years. High blood pressure causes heart disease and increases the risk of stroke.  If you are 55-79 years old, ask your health care provider if you should take aspirin to prevent a heart attack or a stroke.  Do not use any tobacco products, including cigarettes, chewing tobacco, or electronic cigarettes. If you need help quitting, ask your health care provider.  It is important to eat a healthy diet and maintain a healthy weight.  Be sure to include plenty of vegetables, fruits, low-fat dairy products, and lean protein.  Avoid eating foods that are high in solid fats, added sugars, or salt (sodium).  Get regular exercise. This is one of the most important things that you can do for your health.  Try to exercise for at least 150 minutes each week. The type of exercise that you do should increase your heart rate and make you sweat. This is known as moderate-intensity exercise.  Try to do strengthening exercises at least twice each week. Do these in addition to the moderate-intensity exercise.  Know your numbers. Ask your health care provider to check your cholesterol and your blood glucose. Continue to have your blood tested as directed by your health care provider.     What should I know about cancer screening?  There are several types of cancer. Take the following steps to reduce your risk and to catch any cancer development as early as possible.  Breast Cancer  Practice breast self-awareness.  This means understanding how your breasts normally appear and feel.  It also means doing regular breast self-exams. Let your health care provider know about any changes, no matter how small.  If you are 40 or older, have a clinician do a breast exam (clinical breast exam or CBE) every year. Depending on your age, family history, and medical history, it may be recommended that you also have a yearly breast X-ray (mammogram).  If you have a family history of breast  cancer, talk with your health care provider about genetic screening.  If you are at high risk for breast cancer, talk with your health care provider about having an MRI and a mammogram every year.  Breast cancer (BRCA) gene test is recommended for women who have family members with BRCA-related cancers. Results of the assessment will determine the need for genetic counseling and BRCA1 and for BRCA2 testing. BRCA-related cancers include these types:  Breast. This occurs in males or females.  Ovarian.  Tubal. This may also be called fallopian tube cancer.  Cancer of the abdominal or pelvic lining (peritoneal cancer).  Prostate.  Pancreatic.     Cervical, Uterine, and Ovarian Cancer  Your health care provider may recommend that you be screened regularly for cancer of the pelvic organs. These include your ovaries, uterus, and vagina. This screening involves a pelvic exam, which includes checking for microscopic changes to the surface of your cervix (Pap test).  For women ages 21-65, health care providers may recommend a pelvic exam and a Pap test every three years. For women ages 30-65, they may recommend the Pap test and pelvic exam, combined with testing for human papilloma virus (HPV), every five years. Some types of HPV increase your risk of cervical cancer. Testing for HPV may also be done on women of any age who have unclear Pap test results.  Other health care providers may not recommend any screening for nonpregnant women who are considered low risk for pelvic cancer and have no symptoms. Ask your health care provider if a screening pelvic exam is right for you.  If you have had past treatment for cervical cancer or a condition that could lead to cancer, you need Pap tests and screening for cancer for at least 20 years after your treatment. If Pap tests have been discontinued for you, your risk factors (such as having a new sexual partner) need to be reassessed to determine if you should start having screenings  again. Some women have medical problems that increase the chance of getting cervical cancer. In these cases, your health care provider may recommend that you have screening and Pap tests more often.  If you have a family history of uterine cancer or ovarian cancer, talk with your health care provider about genetic screening.  If you have vaginal bleeding after reaching menopause, tell your health care provider.  There are currently no reliable tests available to screen for ovarian cancer.     Lung Cancer  Lung cancer screening is recommended for adults 55-80 years old who are at high risk for lung cancer because of a history of smoking. A yearly low-dose CT scan of the lungs is recommended if you:  Currently smoke.  Have a history of at least 30 pack-years of smoking and you currently smoke or have quit within the past 15 years. A pack-year is smoking an average of one pack of cigarettes per day for one year.     Yearly screening should:  Continue until it has been 15 years since you quit.  Stop if you develop a health problem that would prevent you from having lung cancer treatment.     Colorectal Cancer  This type of cancer can be detected and can often be prevented.  Routine colorectal cancer screening usually begins at age 50 and continues through age 75.  If you have risk factors for colon cancer, your health care provider may recommend that you be screened at an earlier age.  If you have a family history of colorectal cancer, talk with your health care provider about genetic screening.  Your health care provider may also recommend using home test kits to check for hidden blood in your stool.  A small camera at the end of a tube can be used to examine your colon directly (sigmoidoscopy or colonoscopy). This is done to check for the earliest forms of colorectal cancer.  Direct examination of the colon should be repeated every 5-10 years until age 75. However, if early forms of precancerous polyps or small  growths are found or if you have a family history or genetic risk for colorectal cancer, you may need to be screened more often.     Skin Cancer  Check your skin from head to toe regularly.  Monitor any moles. Be sure to tell your health care provider:  About any new moles or changes in moles, especially if there is a change in a mole's shape or color.  If you have a mole that is larger than the size of a pencil eraser.  If any of your family members has a history of skin cancer, especially at a young age, talk with your health care provider about genetic screening.  Always use sunscreen. Apply sunscreen liberally and repeatedly throughout the day.  Whenever you are outside, protect yourself by wearing long sleeves, pants, a wide-brimmed hat, and sunglasses.     What should I know about osteoporosis?  Osteoporosis is a condition in which bone destruction happens more quickly than new bone creation. After menopause, you may be at an increased risk for osteoporosis. To help prevent osteoporosis or the bone fractures that can happen because of osteoporosis, the following is recommended:  If you are 19-50 years old, get at least 1,000 mg of calcium and at least 600 mg of vitamin D per day.  If you are older than age 50 but younger than age 70, get at least 1,200 mg of calcium and at least 600 mg of vitamin D per day.  If you are older than age 70, get at least 1,200 mg of calcium and at least 800 mg of vitamin D per day.     Smoking and excessive alcohol intake increase the risk of osteoporosis. Eat foods that are rich in calcium and vitamin D, and do weight-bearing exercises several times each week as directed by your health care provider.  What should I know about how menopause affects my mental health?  Depression may occur at any age, but it is more common as you become older. Common symptoms of depression include:  Low or sad mood.  Changes in sleep patterns.  Changes in appetite or eating patterns.  Feeling an  overall lack of motivation or enjoyment of activities that you previously enjoyed.  Frequent crying spells.     Talk with your health care provider if you think that you are experiencing depression.  What should I know about immunizations?  It is important that you get and maintain your immunizations. These include:  Tetanus, diphtheria, and pertussis (Tdap) booster vaccine.  Influenza every year before the flu season begins.  Pneumonia vaccine.  Shingles vaccine.     Your health care provider may also recommend other immunizations.  This information is not intended to replace advice given to you by your health care provider. Make sure you discuss any questions you have with your health care provider.  Document Released: 02/09/2007 Document Revised: 07/07/2017 Document Reviewed: 09/20/2016  [employer] Interactive Patient Education © 2018 [employer]            Healthy Delicious Recipes from [employer]: https://iTMan.org/  [place] Plant Based Meal Recipes: https://Vertical Communications.MVERSE/  Heart Healthy Recipes from [employer]: https://abcardio.org/wp-content/uploads/2020/06/ABC_Cookbook.pdf  [place] Healthy Living Guide: https://www.hsp.[place].edu/nutritionsource/2022/01/06/healthy-living-guide-6986-3170/  SNAP Cookbook for Budgets: http://ongov.net/dss/documents/good-and-cheap.pdf

## 2025-03-12 LAB
MEV IGG SER IA-ACNC: >300 AU/ML
MUV IGG SER IA-ACNC: >300 AU/ML
RUBV IGG SERPL IA-ACNC: 21.1 INDEX

## 2025-03-17 ENCOUNTER — OFFICE VISIT (OUTPATIENT)
Dept: CARDIOLOGY | Facility: CLINIC | Age: 75
End: 2025-03-17
Payer: MEDICARE

## 2025-03-17 VITALS
SYSTOLIC BLOOD PRESSURE: 118 MMHG | BODY MASS INDEX: 23.37 KG/M2 | DIASTOLIC BLOOD PRESSURE: 68 MMHG | WEIGHT: 145.4 LBS | HEART RATE: 76 BPM | OXYGEN SATURATION: 99 % | HEIGHT: 66 IN

## 2025-03-17 DIAGNOSIS — E78.5 HYPERLIPIDEMIA LDL GOAL <70: ICD-10-CM

## 2025-03-17 DIAGNOSIS — I10 ESSENTIAL HYPERTENSION: ICD-10-CM

## 2025-03-17 DIAGNOSIS — I25.10 CORONARY ARTERY DISEASE INVOLVING NATIVE CORONARY ARTERY OF NATIVE HEART WITHOUT ANGINA PECTORIS: Primary | ICD-10-CM

## 2025-03-17 PROCEDURE — 3074F SYST BP LT 130 MM HG: CPT | Performed by: INTERNAL MEDICINE

## 2025-03-17 PROCEDURE — 99214 OFFICE O/P EST MOD 30 MIN: CPT | Performed by: INTERNAL MEDICINE

## 2025-03-17 PROCEDURE — 3078F DIAST BP <80 MM HG: CPT | Performed by: INTERNAL MEDICINE

## 2025-03-17 PROCEDURE — G2211 COMPLEX E/M VISIT ADD ON: HCPCS | Performed by: INTERNAL MEDICINE

## 2025-03-17 NOTE — PROGRESS NOTES
[facility] at [facility]  Office visit  Patricia F Bancroft  1950  [phone]    VISIT DATE:  3/17/2025      PCP: Franco Whittaker MD  [address] [place], [place] [id number]    CC:  Chief Complaint   Patient presents with    Coronary artery disease involving native coronary artery of       PROBLEM LIST:  Coronary artery disease:  Status post drug-eluting stent to right coronary artery, May 2014, with no significant residual left system disease (EF 65%).  Essential Hypertension.  Dyslipidemia.  Seasonal allergies/allergic rhinitis.  History of remote tobacco use.  Hyperglycemia.  Surgical history:  Benign breast biopsy x2.  Hysterectomy with ovary removal.      September 2022 TTE  Left ventricular ejection fraction appears to be 56 - 60%. Left ventricular systolic function is normal.  Left ventricular wall thickness is consistent with mild septal asymmetric hypertrophy. Sigmoid-shaped ventricular septum is present.  Left ventricular diastolic function is consistent with (grade Ia w/high LAP) impaired relaxation.  No significant valve heart disease    ASSESSMENT:   Diagnosis Plan   1. Coronary artery disease involving native coronary artery of native heart without angina pectoris        2. Essential hypertension        3. Hyperlipidemia LDL goal <70              PLAN:  Coronary disease: Stable asymptomatic.  Continue aspirin 81 mg by mouth daily, high intensity statin therapy and afterload reduction with combination of lisinopril and metoprolol.    Hypertension: Goal less than 130/80.  Currently controlled.  Continue current medical therapy.    Hyperlipidemia: Goal LDL <70, continue rosuvastatin 40 mg p.o. daily.  Excellent HDL level, LDL less than 100.  Continue regular exercise and heart healthy diet.  Discussed adding Zetia 10 mg p.o. daily if LDL trends higher than 100.    Aortic sclerosis without stenosis.    Palpitations: No high risk clinical features.  Continue beta-blockade.   "Previously developed skin reaction to ambulatory ECG monitor.    Subjective  She is exercising on a regular basis without difficulty at the [facility] center.  Denies chest discomfort.  No dyspnea.  Blood pressures running less than 130/80 mmHg.  She is compliant with medical therapy.    PHYSICAL EXAMINATION:  Vitals:    03/17/25 0924   BP: 118/68   BP Location: Left arm   Patient Position: Sitting   Cuff Size: Adult   Pulse: 76   SpO2: 99%   Weight: 66 kg (145 lb 6.4 oz)   Height: 167.6 cm (65.98\")         General Appearance:    Alert, cooperative, no distress, appears stated age   Head:    Normocephalic, without obvious abnormality, atraumatic   Eyes:    conjunctiva/corneas clear   Nose:   Nares normal, septum midline, mucosa normal, no drainage   Throat:   Lips, teeth and gums normal   Neck:   Supple, symmetrical, trachea midline, no carotid    bruit or JVD   Lungs:     Clear to auscultation bilaterally, respirations unlabored   Chest Wall:    No tenderness or deformity    Heart:    Regular rate and rhythm, S1 and S2 normal, II/6 early peaking systolic murmur right upper sternal border, rub   or gallop, normal carotid impulse bilaterally without bruit.   Abdomen:     Soft, non-tender   Extremities:   Extremities normal, atraumatic, no cyanosis or edema   Pulses:   2+ and symmetric all extremities   Skin:   Skin color, texture, turgor normal, no rashes or lesions       Diagnostic Data:  Procedures  Lab Results   Component Value Date    CHLPL 172 04/27/2016    TRIG 88 03/11/2025    HDL 78 (H) 03/11/2025     Lab Results   Component Value Date    GLUCOSE 82 03/11/2025    BUN 14 03/11/2025    CREATININE 0.75 03/11/2025     03/11/2025    K 4.3 03/11/2025     03/11/2025    CO2 25.0 03/11/2025     Lab Results   Component Value Date    HGBA1C 5.6 03/11/2025     Lab Results   Component Value Date    WBC 8.00 03/25/2021    HGB 16.0 (H) 03/25/2021    HCT 48.9 (H) 03/25/2021     03/25/2021 "       Allergies  Allergies   Allergen Reactions    Other Other (See Comments)    Procaine Other (See Comments)    Ciprofloxacin Myalgia and Other (See Comments)    Dust Mite Extract Cough, Itching and Other (See Comments)     Household Dust, and Animal Dander    Ibuprofen Hives       Current Medications    Current Outpatient Medications:     aspirin 81 MG chewable tablet, Chew 1 tablet Daily., Disp: , Rfl:     Crestor 40 MG tablet, TAKE ONE TABLET BY MOUTH EVERY DAY, Disp: 90 tablet, Rfl: 3    estradiol (CLIMARA) 0.025 MG/24HR patch, Place 1 patch on the skin as directed by provider 1 (One) Time Per Week for 358 days., Disp: 12 patch, Rfl: 3    estradiol (ESTRACE VAGINAL) 0.1 MG/GM vaginal cream, Insert 1 gm intravaginally 1 time each week, Disp: 1 each, Rfl: 12    famotidine (PEPCID) 20 MG tablet, TAKE ONE TABLET BY MOUTH TWICE A DAY, Disp: 180 tablet, Rfl: 3    fluticasone (FLONASE) 50 MCG/ACT nasal spray, Administer 2 sprays into the nostril(s) as directed by provider Daily., Disp: , Rfl:     levocetirizine (XYZAL) 5 MG tablet, Take 1 tablet by mouth Every Evening., Disp: 90 tablet, Rfl: 3    lisinopril (PRINIVIL,ZESTRIL) 10 MG tablet, TAKE ONE TABLET BY MOUTH EVERY DAY, Disp: 90 tablet, Rfl: 3    Polyethylene Glycol 3350 (MIRALAX PO), Take  by mouth., Disp: , Rfl:     Toprol XL 25 MG 24 hr tablet, TAKE ONE TABLET BY MOUTH EVERY DAY, Disp: 90 tablet, Rfl: 1          ROS  Review of Systems   Cardiovascular:  Negative for chest pain, dyspnea on exertion and irregular heartbeat.   Respiratory:  Negative for cough.          SOCIAL HX  Social History     Socioeconomic History    Marital status:     Number of children: 0   Tobacco Use    Smoking status: Former     Current packs/day: 0.00     Average packs/day: 1.5 packs/day for 43.6 years (65.4 ttl pk-yrs)     Types: Cigarettes     Start date: 1/1/1966     Quit date: 8/14/2009     Years since quitting: 15.6     Passive exposure: Past    Smokeless tobacco: Never    Vaping Use    Vaping status: Never Used   Substance and Sexual Activity    Alcohol use: Not Currently     Alcohol/week: 1.0 standard drink of alcohol     Types: 1 Glasses of wine per week     Comment: Quit     Drug use: Never     Types: Marijuana     Comment: as a teenager    Sexual activity: Not Currently     Partners: Male     Birth control/protection: Hysterectomy       FAMILY HX  Family History   Problem Relation Age of Onset    Cancer Mother         Lung cancer;  Dec 1996    Lung cancer Mother     Cancer Father         Primary site unknown;  1996    Prostate cancer Father     Heart disease Father     Cancer Sister         Pancreatic cancer; 2020    Pancreatic cancer Sister     Heart disease Maternal Grandfather     Stroke Maternal Aunt     Diabetes Cousin     Migraines Cousin     Migraines Cousin     Cancer Sister         Pancreatic cancer;      Breast cancer Neg Hx     Ovarian cancer Neg Hx     Uterine cancer Neg Hx     Colon cancer Neg Hx     Melanoma Neg Hx              Ghanshyam Padron III, MD, FACC

## 2025-03-24 ENCOUNTER — PATIENT MESSAGE (OUTPATIENT)
Dept: INTERNAL MEDICINE | Facility: CLINIC | Age: 75
End: 2025-03-24

## 2025-04-10 ENCOUNTER — TELEPHONE (OUTPATIENT)
Dept: INTERNAL MEDICINE | Facility: CLINIC | Age: 75
End: 2025-04-10
Payer: MEDICARE

## 2025-04-10 NOTE — TELEPHONE ENCOUNTER
Please call patient to let her know that Dr. Whittaker is out of office at this time and I am covering for him.  I have received the MRI of her lumbar spine.  I would like for her to see neurosurgery for evaluation.  The referral does not specifically mean she would have surgery however neurosurgery can give expert opinion on next steps regarding her plan of care.  Her MRI noted degenerative changes, areas of narrowing and disc bulging.  If she is agreeable to the consultation I will place the order.

## 2025-04-10 NOTE — TELEPHONE ENCOUNTER
Spoke with patient and she voiced understanding- pt sees JAYLEN CUBA in Neurology already and has appointment 04-- can she continue to see JAYLEN CUBA with Neurology or does patient need to see Neuro Surgery as well ?

## 2025-04-15 ENCOUNTER — OFFICE VISIT (OUTPATIENT)
Dept: NEUROLOGY | Facility: CLINIC | Age: 75
End: 2025-04-15
Payer: MEDICARE

## 2025-04-15 VITALS
DIASTOLIC BLOOD PRESSURE: 70 MMHG | OXYGEN SATURATION: 96 % | WEIGHT: 145 LBS | BODY MASS INDEX: 23.3 KG/M2 | HEIGHT: 66 IN | HEART RATE: 71 BPM | SYSTOLIC BLOOD PRESSURE: 114 MMHG

## 2025-04-15 DIAGNOSIS — G62.9 POLYNEUROPATHY: Primary | ICD-10-CM

## 2025-04-15 PROCEDURE — 3078F DIAST BP <80 MM HG: CPT | Performed by: NURSE PRACTITIONER

## 2025-04-15 PROCEDURE — 3074F SYST BP LT 130 MM HG: CPT | Performed by: NURSE PRACTITIONER

## 2025-04-15 PROCEDURE — 1160F RVW MEDS BY RX/DR IN RCRD: CPT | Performed by: NURSE PRACTITIONER

## 2025-04-15 PROCEDURE — 99213 OFFICE O/P EST LOW 20 MIN: CPT | Performed by: NURSE PRACTITIONER

## 2025-04-15 PROCEDURE — 1159F MED LIST DOCD IN RCRD: CPT | Performed by: NURSE PRACTITIONER

## 2025-04-15 NOTE — PROGRESS NOTES
Neuro Office Visit      Encounter Date: 04/15/2025   Patient Name: Patricia F Bancroft  : 1950   MRN: [id number]   PCP:  Franco Whittaker MD     Chief Complaint:    Chief Complaint   Patient presents with    Polyneuropathy       History of Present Illness: Patricia F Bancroft is a 74 y.o. female who is here today in Neurology for neuropathy.    Last visit with me on 9/10/2024, continued to monitor.  History of Present Illness  Neuropathic symptoms persist, primarily localized to the plantar surfaces of the feet.     Despite the superficial nature of these symptoms, pressure perception and spatial awareness are retained.     Concern is expressed regarding the chronicity of the condition and its potential for resolution.     Pain is experienced when standing on hard surfaces due to the presence of corns.     Additionally, feet become cold and pale, indicative of poor blood circulation.     Hope is expressed that a recent lumbar CT scan will provide further insight into the condition. A history of foot-related issues, including high arches, is noted.    INTERVAL: Since the last visit, the patient reports that the neuropathy remains the same, not horribly bothersome but persistent.    SOCIAL HISTORY  Marital Status:       Subjective      Past Medical History:   Past Medical History:   Diagnosis Date    Abnormal ECG     Allergic     Allergic rhinitis     Seasonal allergies    Arrhythmia     Coronary artery disease     S/P JAN to RCA, May 2014 EF 65% no residual Left system disease    Dyslipidemia     Dysosmia     Headache, tension-type     Heart murmur     History of tobacco use 2020    History of vulvar dysplasia 07/10/2024    HL (hearing loss) 1970    Tinnitus    Hx of migraines     Hyperlipidemia     Hypertension     Medicare annual wellness visit, subsequent 2023    Migraine     Mitral valve prolapse     Osteopenia     Osteopenia improving    Pneumonia 1971    Prediabetes 10/07/2016     Routine gynecological examination 10/28/2024    Urinary tract infection ?       Past Surgical History:   Past Surgical History:   Procedure Laterality Date    BREAST BIOPSY Left     BREAST EXCISIONAL BIOPSY Left     BREAST SURGERY      breast biopsy X2    CARDIAC CATHETERIZATION  2014    Also     COLONOSCOPY      COLONOSCOPY      cologaurd     COLONOSCOPY      2009    CORONARY STENT PLACEMENT  2014    HYSTERECTOMY  2004    I & D / EXCISION MARSUPIALIZATION BARTHOLIN'S GLAND CYST / LYSIS LESIONS  2024    OOPHORECTOMY      UMBILICAL HERNIA REPAIR      VULVECTOMY  2021       Family History:   Family History   Problem Relation Age of Onset    Cancer Mother         Lung cancer;  Dec 1996    Lung cancer Mother     Cancer Father         Primary site unknown;  1996    Prostate cancer Father     Heart disease Father     Cancer Sister         Pancreatic cancer;      Pancreatic cancer Sister     Heart disease Maternal Grandfather     Stroke Maternal Aunt     Diabetes Cousin     Migraines Cousin     Migraines Cousin     Cancer Sister         Pancreatic cancer;      Breast cancer Neg Hx     Ovarian cancer Neg Hx     Uterine cancer Neg Hx     Colon cancer Neg Hx     Melanoma Neg Hx        Social History:   Social History     Socioeconomic History    Marital status:     Number of children: 0   Tobacco Use    Smoking status: Former     Current packs/day: 0.00     Average packs/day: 1.5 packs/day for 43.6 years (65.4 ttl pk-yrs)     Types: Cigarettes     Start date: 1966     Quit date: 2009     Years since quitting: 15.6     Passive exposure: Past    Smokeless tobacco: Never   Vaping Use    Vaping status: Never Used   Substance and Sexual Activity    Alcohol use: Not Currently     Alcohol/week: 1.0 standard drink of alcohol     Types: 1 Glasses of wine per week     Comment: Quit     Drug use: Never     Types: Marijuana      Comment: as a teenager    Sexual activity: Not Currently     Partners: Male     Birth control/protection: Hysterectomy       Medications:     Current Outpatient Medications:     aspirin 81 MG chewable tablet, Chew 1 tablet Daily., Disp: , Rfl:     Crestor 40 MG tablet, TAKE ONE TABLET BY MOUTH EVERY DAY, Disp: 90 tablet, Rfl: 3    estradiol (CLIMARA) 0.025 MG/24HR patch, Place 1 patch on the skin as directed by provider 1 (One) Time Per Week for 358 days., Disp: 12 patch, Rfl: 3    estradiol (ESTRACE VAGINAL) 0.1 MG/GM vaginal cream, Insert 1 gm intravaginally 1 time each week, Disp: 1 each, Rfl: 12    famotidine (PEPCID) 20 MG tablet, TAKE ONE TABLET BY MOUTH TWICE A DAY, Disp: 180 tablet, Rfl: 3    fluticasone (FLONASE) 50 MCG/ACT nasal spray, Administer 2 sprays into the nostril(s) as directed by provider Daily., Disp: , Rfl:     levocetirizine (XYZAL) 5 MG tablet, Take 1 tablet by mouth Every Evening., Disp: 90 tablet, Rfl: 3    lisinopril (PRINIVIL,ZESTRIL) 10 MG tablet, TAKE ONE TABLET BY MOUTH EVERY DAY, Disp: 90 tablet, Rfl: 3    Polyethylene Glycol 3350 (MIRALAX PO), Take  by mouth., Disp: , Rfl:     Toprol XL 25 MG 24 hr tablet, TAKE ONE TABLET BY MOUTH EVERY DAY, Disp: 90 tablet, Rfl: 1    Allergies:   Allergies   Allergen Reactions    Other Other (See Comments)    Procaine Other (See Comments)    Ciprofloxacin Myalgia and Other (See Comments)    Dust Mite Extract Cough, Itching and Other (See Comments)     Household Dust, and Animal Dander    Ibuprofen Hives       PHQ-9 Total Score:     STEADI Fall Risk Assessment was completed, and patient is at LOW risk for falls.Assessment completed on:4/15/2025    Objective     Physical Exam:     Neurological Exam  Mental Status  Awake, alert and oriented to person, place and time. Recent and remote memory are intact. Speech is normal. Language is fluent with no aphasia. Attention and concentration are normal. Fund of knowledge is appropriate for level of  "education.    Cranial Nerves  CN II: Visual acuity is normal.  CN III, IV, VI: Extraocular movements intact bilaterally. Pupils equal round and reactive to light bilaterally.  CN V: Facial sensation is normal.  CN VII: Full and symmetric facial movement.  CN IX, X: Palate elevates symmetrically  CN XI: Shoulder shrug strength is normal.  CN XII: Tongue midline without atrophy or fasciculations.    Motor  Normal muscle bulk throughout. No fasciculations present. Normal muscle tone. Strength is 5/5 throughout all four extremities.    Sensory  Sensation is intact to light touch, pinprick, vibration and proprioception in all four extremities.    Reflexes                                            Right                      Left  Brachioradialis                    2+                         2+  Biceps                                 2+                         2+  Triceps                                2+                         2+  Finger flex                           2+                         2+  Hamstring                            2+                         2+  Patellar                                2+                         2+  Achilles                                2+                         2+    Coordination    Finger-to-nose, rapid alternating movements and heel-to-shin normal bilaterally without dysmetria.    Gait  Normal casual, toe, heel and tandem gait.        Vital Signs:   Vitals:    04/15/25 0900   BP: 114/70   Pulse: 71   SpO2: 96%   Weight: 65.8 kg (145 lb)   Height: 167.6 cm (65.98\")     Body mass index is 23.42 kg/m².     Results:   Results  Labs   - CMP: Normal    Imaging   - Scan of the vertebrae: Some osteoarthritic changes and bone spurs, but no significant impingement on the spinal cord     Imaging:   No Images in the past 120 days found..     Labs:   No results found for: \"CMP\", \"PROTEIN\", \"ANTIMOGAB\", \"SXZOGC6POXH\", \"JCVRESULT\", \"QUANTTBGOLD\", \"CBCDIF\", \"IGGALBSER\"     Assessment / Plan  "     Assessment/Plan:   Diagnoses and all orders for this visit:    1. Polyneuropathy (Primary)  Comments:  Continue to monitor         Assessment & Plan  1. Neuropathy.  She reports that the neuropathy remains the same, primarily affecting the bottoms of her feet. She can feel pressure and knows where she is stepping, but the sensation persists. The condition is not severe enough to require medication at this time. She will continue to monitor the symptoms. The recent scan from [facility] revealed age-related osteoarthritic changes and minor bone spurs on the vertebrae, but no significant spinal cord impingement. This information will be shared with Dr. Whittaker for further evaluation.    Patient Education:     Reviewed medications, potential side effects and signs and symptoms to report. Discussed risk versus benefits of treatment plan with patient and/or family-including medications, labs and radiology that may be ordered. Addressed questions and concerns during visit. Patient and/or family verbalized understanding and agree with plan. Instructed to call the office with any questions and report to ER with any life-threatening symptoms.     Follow Up:   Return in about 6 months (around 10/15/2025).    I spent 30 minutes caring for Dahiana on this date of service. This time includes time spent by me in the following activities: preparing for the visit, reviewing tests, performing a medically appropriate examination and/or evaluation, counseling and educating the patient/family/caregiver, and documenting information in the medical record.        During this visit the following were done:  Labs Reviewed []    Labs Ordered []    Radiology Reports Reviewed [x]    Radiology Ordered []    PCP Records Reviewed []    Referring Provider Records Reviewed []    ER Records Reviewed []    Hospital Records Reviewed []    History Obtained From Family []    Radiology Images Reviewed []    Other Reviewed []    Records  Requested []      Patient or patient representative verbalized consent for the use of Ambient Listening during the visit with  ROSA ELENA Ramirez for chart documentation. 4/15/2025  13:10 EDT    ROSA ELENA Ramirez   [facility]  [address] [place] [address] [address]  [facility] [id number] [phone]

## 2025-04-17 NOTE — TELEPHONE ENCOUNTER
Pt states that she has already been contacted about this she states that she is in no pain at this time and she will be following up with Dr. Whittaker in September.

## 2025-04-29 ENCOUNTER — TELEPHONE (OUTPATIENT)
Dept: NEUROLOGY | Facility: CLINIC | Age: 75
End: 2025-04-29
Payer: MEDICARE

## 2025-04-29 NOTE — TELEPHONE ENCOUNTER
Spoke with [employer] to have them Power Share the Pt Lumbar spine from 4/9/25.    Images will be sent over to  [facility] and email will be sent to  [employer] [facility].    Understanding was verbalized.

## 2025-04-29 NOTE — TELEPHONE ENCOUNTER
----- Message from Cori Caro sent at 4/15/2025  9:31 AM EDT -----  Would you see if [facility] will powershare the MRI of the lumbar spine from 4/9/2025?

## 2025-05-11 ENCOUNTER — PATIENT MESSAGE (OUTPATIENT)
Dept: INTERNAL MEDICINE | Facility: CLINIC | Age: 75
End: 2025-05-11
Payer: MEDICARE

## 2025-05-11 DIAGNOSIS — E78.2 MIXED HYPERLIPIDEMIA: ICD-10-CM

## 2025-05-11 DIAGNOSIS — I10 ESSENTIAL HYPERTENSION: ICD-10-CM

## 2025-05-12 DIAGNOSIS — I10 ESSENTIAL HYPERTENSION: ICD-10-CM

## 2025-05-12 RX ORDER — METOPROLOL SUCCINATE 25 MG/1
25 TABLET, EXTENDED RELEASE ORAL DAILY
Qty: 90 TABLET | Refills: 1 | Status: SHIPPED | OUTPATIENT
Start: 2025-05-12

## 2025-05-14 RX ORDER — LISINOPRIL 10 MG/1
10 TABLET ORAL DAILY
Qty: 90 TABLET | Refills: 3 | Status: SHIPPED | OUTPATIENT
Start: 2025-05-14

## 2025-05-14 RX ORDER — ROSUVASTATIN CALCIUM 40 MG/1
40 TABLET, COATED ORAL DAILY
Qty: 90 TABLET | Refills: 3 | Status: SHIPPED | OUTPATIENT
Start: 2025-05-14